# Patient Record
Sex: FEMALE | Race: WHITE | Employment: OTHER | URBAN - METROPOLITAN AREA
[De-identification: names, ages, dates, MRNs, and addresses within clinical notes are randomized per-mention and may not be internally consistent; named-entity substitution may affect disease eponyms.]

---

## 2017-10-24 NOTE — PROGRESS NOTES
Patient completed online preop spine presentation on 10/21/17. Called and spoke with patient to answer any additional questions she may have.

## 2017-10-25 ENCOUNTER — HOSPITAL ENCOUNTER (OUTPATIENT)
Dept: PREADMISSION TESTING | Age: 72
Discharge: HOME | End: 2017-10-25
Payer: MEDICARE

## 2017-10-25 VITALS
DIASTOLIC BLOOD PRESSURE: 71 MMHG | WEIGHT: 181.38 LBS | HEIGHT: 66 IN | HEART RATE: 56 BPM | BODY MASS INDEX: 29.15 KG/M2 | SYSTOLIC BLOOD PRESSURE: 127 MMHG | TEMPERATURE: 98.1 F

## 2017-10-25 LAB
ABO + RH BLD: NORMAL
BLOOD GROUP ANTIBODIES SERPL: NORMAL
SPECIMEN EXP DATE BLD: NORMAL

## 2017-10-25 PROCEDURE — 86900 BLOOD TYPING SEROLOGIC ABO: CPT | Performed by: ORTHOPAEDIC SURGERY

## 2017-10-25 PROCEDURE — 36415 COLL VENOUS BLD VENIPUNCTURE: CPT | Performed by: ORTHOPAEDIC SURGERY

## 2017-10-25 RX ORDER — KETOTIFEN FUMARATE 0.35 MG/ML
1 SOLUTION/ DROPS OPHTHALMIC AS NEEDED
COMMUNITY
End: 2020-11-05

## 2017-10-25 RX ORDER — ESTERIFIED ESTROGEN AND METHYLTESTOSTERONE .625; 1.25 MG/1; MG/1
1 TABLET ORAL DAILY
Status: ON HOLD | COMMUNITY
End: 2017-11-15

## 2017-10-25 RX ORDER — FEXOFENADINE HCL AND PSEUDOEPHEDRINE HCI 180; 240 MG/1; MG/1
1 TABLET, EXTENDED RELEASE ORAL AS NEEDED
Status: ON HOLD | COMMUNITY
End: 2017-11-15

## 2017-10-25 RX ORDER — MEDROXYPROGESTERONE ACETATE 2.5 MG/1
2.5 TABLET ORAL DAILY
Status: ON HOLD | COMMUNITY
End: 2017-10-27

## 2017-10-25 RX ORDER — WARFARIN SODIUM 5 MG/1
5 TABLET ORAL DAILY
Status: ON HOLD | COMMUNITY
End: 2017-11-15

## 2017-10-25 RX ORDER — LEVOTHYROXINE SODIUM 88 UG/1
88 TABLET ORAL
COMMUNITY
End: 2020-11-05 | Stop reason: SDUPTHER

## 2017-10-25 RX ORDER — METFORMIN HYDROCHLORIDE 500 MG/1
1 TABLET, FILM COATED, EXTENDED RELEASE ORAL 2 TIMES DAILY
Status: ON HOLD | COMMUNITY
End: 2017-11-15

## 2017-10-25 RX ORDER — RANITIDINE 150 MG/1
150 CAPSULE ORAL
Status: ON HOLD | COMMUNITY
End: 2017-11-15

## 2017-10-25 RX ORDER — TRIAMCINOLONE ACETONIDE 1 MG/G
OINTMENT TOPICAL AS NEEDED
Status: ON HOLD | COMMUNITY
End: 2017-11-15

## 2017-10-25 RX ORDER — CHOLECALCIFEROL (VITAMIN D3) 125 MCG
1 CAPSULE ORAL AS NEEDED
Status: ON HOLD | COMMUNITY
End: 2017-11-15

## 2017-10-25 RX ORDER — DESOXIMETASONE 2.5 MG/G
CREAM TOPICAL AS NEEDED
COMMUNITY
End: 2020-11-05

## 2017-10-25 RX ORDER — MELATONIN
1000 DAILY
Status: ON HOLD | COMMUNITY
End: 2017-11-15

## 2017-10-26 ENCOUNTER — ANESTHESIA EVENT (OUTPATIENT)
Dept: INTERVENTIONAL RADIOLOGY/VASCULAR | Age: 72
End: 2017-10-26
Payer: MEDICARE

## 2017-10-26 LAB
BACTERIA SPEC CULT: NORMAL
BACTERIA SPEC CULT: NORMAL
SERVICE CMNT-IMP: NORMAL

## 2017-10-27 ENCOUNTER — APPOINTMENT (OUTPATIENT)
Dept: GENERAL RADIOLOGY | Age: 72
End: 2017-10-27
Attending: RADIOLOGY
Payer: MEDICARE

## 2017-10-27 ENCOUNTER — HOSPITAL ENCOUNTER (OUTPATIENT)
Dept: INTERVENTIONAL RADIOLOGY/VASCULAR | Age: 72
Discharge: HOME | End: 2017-10-27
Attending: ORTHOPAEDIC SURGERY | Admitting: ORTHOPAEDIC SURGERY
Payer: MEDICARE

## 2017-10-27 ENCOUNTER — ANESTHESIA (OUTPATIENT)
Dept: INTERVENTIONAL RADIOLOGY/VASCULAR | Age: 72
End: 2017-10-27
Payer: MEDICARE

## 2017-10-27 VITALS
HEIGHT: 66 IN | TEMPERATURE: 97.7 F | OXYGEN SATURATION: 100 % | DIASTOLIC BLOOD PRESSURE: 61 MMHG | RESPIRATION RATE: 18 BRPM | BODY MASS INDEX: 29.41 KG/M2 | HEART RATE: 57 BPM | WEIGHT: 183 LBS | SYSTOLIC BLOOD PRESSURE: 129 MMHG

## 2017-10-27 DIAGNOSIS — M41.55 OTHER SECONDARY SCOLIOSIS, THORACOLUMBAR REGION: ICD-10-CM

## 2017-10-27 DIAGNOSIS — M48.54XA COLLAPSE OF THORACIC VERTEBRA (HCC): ICD-10-CM

## 2017-10-27 LAB
GLUCOSE BLD STRIP.AUTO-MCNC: 94 MG/DL (ref 65–100)
GLUCOSE BLD STRIP.AUTO-MCNC: 95 MG/DL (ref 65–100)
INR BLD: 1 (ref 0.9–1.2)
SERVICE CMNT-IMP: NORMAL
SERVICE CMNT-IMP: NORMAL

## 2017-10-27 PROCEDURE — 74011250636 HC RX REV CODE- 250/636

## 2017-10-27 PROCEDURE — 85610 PROTHROMBIN TIME: CPT

## 2017-10-27 PROCEDURE — 82962 GLUCOSE BLOOD TEST: CPT

## 2017-10-27 PROCEDURE — 71020 XR CHEST PA LAT: CPT

## 2017-10-27 PROCEDURE — 74011636320 HC RX REV CODE- 636/320: Performed by: RADIOLOGY

## 2017-10-27 PROCEDURE — 74011000250 HC RX REV CODE- 250: Performed by: RADIOLOGY

## 2017-10-27 PROCEDURE — 77030034843 HC TAMP SPN BN INFL EXP II KYPH -H

## 2017-10-27 PROCEDURE — 77030003666 HC NDL SPINAL BD -A

## 2017-10-27 PROCEDURE — 77030018846 HC SOL IRR STRL H20 ICUM -A

## 2017-10-27 PROCEDURE — 76060000033 HC ANESTHESIA 1 TO 1.5 HR

## 2017-10-27 PROCEDURE — 77030011860 HC TAMP BN KYPH -C

## 2017-10-27 PROCEDURE — 77030030399

## 2017-10-27 PROCEDURE — 74011250636 HC RX REV CODE- 250/636: Performed by: RADIOLOGY

## 2017-10-27 PROCEDURE — C1713 ANCHOR/SCREW BN/BN,TIS/BN: HCPCS

## 2017-10-27 PROCEDURE — 22513 PERQ VERTEBRAL AUGMENTATION: CPT

## 2017-10-27 RX ORDER — LIDOCAINE HYDROCHLORIDE 20 MG/ML
20 INJECTION, SOLUTION INFILTRATION; PERINEURAL
Status: COMPLETED | OUTPATIENT
Start: 2017-10-27 | End: 2017-10-27

## 2017-10-27 RX ORDER — BUPIVACAINE HYDROCHLORIDE 5 MG/ML
30 INJECTION, SOLUTION EPIDURAL; INTRACAUDAL
Status: COMPLETED | OUTPATIENT
Start: 2017-10-27 | End: 2017-10-27

## 2017-10-27 RX ORDER — KETOROLAC TROMETHAMINE 30 MG/ML
15-30 INJECTION, SOLUTION INTRAMUSCULAR; INTRAVENOUS AS NEEDED
Status: DISCONTINUED | OUTPATIENT
Start: 2017-10-27 | End: 2017-10-27 | Stop reason: HOSPADM

## 2017-10-27 RX ORDER — FENTANYL CITRATE 50 UG/ML
INJECTION, SOLUTION INTRAMUSCULAR; INTRAVENOUS AS NEEDED
Status: DISCONTINUED | OUTPATIENT
Start: 2017-10-27 | End: 2017-10-27 | Stop reason: HOSPADM

## 2017-10-27 RX ORDER — MEDROXYPROGESTERONE ACETATE 2.5 MG/1
2.5 TABLET ORAL DAILY
Status: ON HOLD | COMMUNITY
End: 2017-11-15

## 2017-10-27 RX ORDER — PROPOFOL 10 MG/ML
INJECTION, EMULSION INTRAVENOUS AS NEEDED
Status: DISCONTINUED | OUTPATIENT
Start: 2017-10-27 | End: 2017-10-27 | Stop reason: HOSPADM

## 2017-10-27 RX ORDER — SODIUM CHLORIDE, SODIUM LACTATE, POTASSIUM CHLORIDE, CALCIUM CHLORIDE 600; 310; 30; 20 MG/100ML; MG/100ML; MG/100ML; MG/100ML
INJECTION, SOLUTION INTRAVENOUS
Status: DISCONTINUED | OUTPATIENT
Start: 2017-10-27 | End: 2017-10-27 | Stop reason: HOSPADM

## 2017-10-27 RX ORDER — CEFAZOLIN SODIUM 1 G/3ML
INJECTION, POWDER, FOR SOLUTION INTRAMUSCULAR; INTRAVENOUS AS NEEDED
Status: DISCONTINUED | OUTPATIENT
Start: 2017-10-27 | End: 2017-10-27 | Stop reason: HOSPADM

## 2017-10-27 RX ORDER — CEFAZOLIN SODIUM IN 0.9 % NACL 2 G/50 ML
2 INTRAVENOUS SOLUTION, PIGGYBACK (ML) INTRAVENOUS ONCE
Status: DISCONTINUED | OUTPATIENT
Start: 2017-10-27 | End: 2017-10-27 | Stop reason: HOSPADM

## 2017-10-27 RX ORDER — PROPOFOL 10 MG/ML
INJECTION, EMULSION INTRAVENOUS
Status: DISCONTINUED | OUTPATIENT
Start: 2017-10-27 | End: 2017-10-27 | Stop reason: HOSPADM

## 2017-10-27 RX ORDER — SODIUM CHLORIDE 9 MG/ML
25 INJECTION, SOLUTION INTRAVENOUS CONTINUOUS
Status: DISCONTINUED | OUTPATIENT
Start: 2017-10-27 | End: 2017-10-27 | Stop reason: HOSPADM

## 2017-10-27 RX ORDER — ONDANSETRON 2 MG/ML
INJECTION INTRAMUSCULAR; INTRAVENOUS AS NEEDED
Status: DISCONTINUED | OUTPATIENT
Start: 2017-10-27 | End: 2017-10-27 | Stop reason: HOSPADM

## 2017-10-27 RX ORDER — FENTANYL CITRATE 50 UG/ML
INJECTION, SOLUTION INTRAMUSCULAR; INTRAVENOUS
Status: COMPLETED
Start: 2017-10-27 | End: 2017-10-27

## 2017-10-27 RX ORDER — MIDAZOLAM HYDROCHLORIDE 1 MG/ML
INJECTION, SOLUTION INTRAMUSCULAR; INTRAVENOUS
Status: DISCONTINUED
Start: 2017-10-27 | End: 2017-10-27 | Stop reason: HOSPADM

## 2017-10-27 RX ORDER — DEXAMETHASONE SODIUM PHOSPHATE 4 MG/ML
INJECTION, SOLUTION INTRA-ARTICULAR; INTRALESIONAL; INTRAMUSCULAR; INTRAVENOUS; SOFT TISSUE AS NEEDED
Status: DISCONTINUED | OUTPATIENT
Start: 2017-10-27 | End: 2017-10-27 | Stop reason: HOSPADM

## 2017-10-27 RX ORDER — MIDAZOLAM HYDROCHLORIDE 1 MG/ML
INJECTION, SOLUTION INTRAMUSCULAR; INTRAVENOUS AS NEEDED
Status: DISCONTINUED | OUTPATIENT
Start: 2017-10-27 | End: 2017-10-27 | Stop reason: HOSPADM

## 2017-10-27 RX ORDER — MIDAZOLAM HYDROCHLORIDE 1 MG/ML
INJECTION, SOLUTION INTRAMUSCULAR; INTRAVENOUS AS NEEDED
Status: DISCONTINUED | OUTPATIENT
Start: 2017-10-27 | End: 2017-10-27

## 2017-10-27 RX ADMIN — SODIUM CHLORIDE, SODIUM LACTATE, POTASSIUM CHLORIDE, CALCIUM CHLORIDE: 600; 310; 30; 20 INJECTION, SOLUTION INTRAVENOUS at 10:53

## 2017-10-27 RX ADMIN — SODIUM CHLORIDE, SODIUM LACTATE, POTASSIUM CHLORIDE, CALCIUM CHLORIDE: 600; 310; 30; 20 INJECTION, SOLUTION INTRAVENOUS at 11:30

## 2017-10-27 RX ADMIN — CEFAZOLIN SODIUM 2 G: 1 INJECTION, POWDER, FOR SOLUTION INTRAMUSCULAR; INTRAVENOUS at 11:07

## 2017-10-27 RX ADMIN — PROPOFOL 10 MG: 10 INJECTION, EMULSION INTRAVENOUS at 11:28

## 2017-10-27 RX ADMIN — PROPOFOL 20 MG: 10 INJECTION, EMULSION INTRAVENOUS at 11:23

## 2017-10-27 RX ADMIN — DEXAMETHASONE SODIUM PHOSPHATE 4 MG: 4 INJECTION, SOLUTION INTRA-ARTICULAR; INTRALESIONAL; INTRAMUSCULAR; INTRAVENOUS; SOFT TISSUE at 11:15

## 2017-10-27 RX ADMIN — PROPOFOL 40 MG: 10 INJECTION, EMULSION INTRAVENOUS at 11:05

## 2017-10-27 RX ADMIN — PROPOFOL 50 MCG/KG/MIN: 10 INJECTION, EMULSION INTRAVENOUS at 11:10

## 2017-10-27 RX ADMIN — FENTANYL CITRATE 25 MCG: 50 INJECTION, SOLUTION INTRAMUSCULAR; INTRAVENOUS at 11:23

## 2017-10-27 RX ADMIN — IOHEXOL 20 ML: 240 INJECTION, SOLUTION INTRATHECAL; INTRAVASCULAR; INTRAVENOUS; ORAL at 11:25

## 2017-10-27 RX ADMIN — MIDAZOLAM HYDROCHLORIDE 3 MG: 1 INJECTION, SOLUTION INTRAMUSCULAR; INTRAVENOUS at 11:05

## 2017-10-27 RX ADMIN — FENTANYL CITRATE 12.5 MCG: 50 INJECTION, SOLUTION INTRAMUSCULAR; INTRAVENOUS at 11:44

## 2017-10-27 RX ADMIN — PROPOFOL 10 MG: 10 INJECTION, EMULSION INTRAVENOUS at 11:33

## 2017-10-27 RX ADMIN — PROPOFOL 10 MG: 10 INJECTION, EMULSION INTRAVENOUS at 11:46

## 2017-10-27 RX ADMIN — DEXAMETHASONE SODIUM PHOSPHATE 4 MG: 4 INJECTION, SOLUTION INTRA-ARTICULAR; INTRALESIONAL; INTRAMUSCULAR; INTRAVENOUS; SOFT TISSUE at 12:22

## 2017-10-27 RX ADMIN — LIDOCAINE HYDROCHLORIDE 10 ML: 20 INJECTION, SOLUTION INFILTRATION; PERINEURAL at 11:24

## 2017-10-27 RX ADMIN — SODIUM CHLORIDE 25 ML/HR: 900 INJECTION, SOLUTION INTRAVENOUS at 09:54

## 2017-10-27 RX ADMIN — ONDANSETRON 4 MG: 2 INJECTION INTRAMUSCULAR; INTRAVENOUS at 11:15

## 2017-10-27 RX ADMIN — BUPIVACAINE HYDROCHLORIDE 10 ML: 5 INJECTION, SOLUTION EPIDURAL; INTRACAUDAL at 11:24

## 2017-10-27 NOTE — IP AVS SNAPSHOT
[address] [facility] 
[phone] Patient: Caprice Miller MRN: [id number] AUR:7[birth year]39[birth year] About your hospitalization You were admitted on:  October 27, 2017 You last received care in the:  [facility] RAD ANGIO IR You were discharged on:  October 27, 2017 Why you were hospitalized Your primary diagnosis was:  Not on File Things You Need To Do (next 8 weeks) Follow up with Alfredo Ahumada, MD  
  
Phone:  [phone] Where:  [address], [address] [facility] [address] Discharge Orders None A check kaur indicates which time of day the medication should be taken. My Medications TAKE these medications as instructed Instructions Each Dose to Equal  
 Morning Noon Evening Bedtime ALEVE 220 mg Cap Generic drug:  naproxen sodium Your last dose was: Your next dose is: Take 1 Cap by mouth as needed. 1 Cap ENRIKE-SELTZER HEARTBURN+-80 mg Athigo Generic drug:  calcium carbonate-simethicone Your last dose was: Your next dose is: Take  by mouth nightly as needed. ALLEGRA-D 24 HOUR 180-240 mg per tablet Generic drug:  fexofenadine-pseudoephedrine Your last dose was: Your next dose is: Take 1 Tab by mouth as needed. 1 Tab AYR SALINE 0.65 % nasal spray Generic drug:  sodium chloride Your last dose was: Your next dose is:    
   
   
 1 Spray as needed for Congestion. 1 Spray AYR SALINE topical gel Generic drug:  sodium chloride-aloe vera Your last dose was: Your next dose is:    
   
   
 Apply  to affected area as needed. Benefiber Clear 3 gram/3.5 gram Pwpk Generic drug:  Wheat Dextrin Your last dose was: Your next dose is: Take  by mouth Daily (before breakfast). CENTRUM SILVER 0.4-300-250 mg-mcg-mcg Tab Generic drug:  multivit-min-FA-lycopen-lutein Your last dose was: Your next dose is: Take 1 Tab by mouth daily. 1 Tab COVARYX H.S. 0.625-1.25 mg per tablet Generic drug:  estrogens (conjugated)-methylTESTOSTERone Your last dose was: Your next dose is: Take 1 Tab by mouth daily. 1 Tab CULTURELLE PROBIOTICS PO Your last dose was: Your next dose is: Take 1 Tab by mouth daily. [address] 1 Tab  
    
   
   
   
  
 desoximetasone 0.25 % topical cream  
Commonly known as:  Tasha Garrido Your last dose was: Your next dose is:    
   
   
 Apply  to affected area as needed for Skin Irritation. EYE ITCH RELIEF 0.025 % (0.035 %) ophthalmic solution Generic drug:  ketotifen Your last dose was: Your next dose is:    
   
   
 Administer 1 Drop to both eyes as needed. 1 Drop  
    
   
   
   
  
 medroxyPROGESTERone 2.5 mg tablet Commonly known as:  PROVERA Your last dose was: Your next dose is: Take 2.5 mg by mouth daily. 2.5 mg  
    
   
   
   
  
 metFORMIN 500 mg Tg24 24 hour tablet Commonly known asEarna Schwalbe ER Your last dose was: Your next dose is: Take 1 Tab by mouth two (2) times a day. AFTER BREAKFAST AND DINNER  
 1 Tab NAPHCON-A 0.025-0.3 % ophthalmic solution Generic drug:  naphazoline-pheniramine Your last dose was: Your next dose is:    
   
   
 Administer  to both eyes as needed. OAT BRAN PO Your last dose was: Your next dose is: Take 1 Tab by mouth daily. 1 Tab OMEGA 3 FISH OIL PO Your last dose was: Your next dose is: Take 1 Tab by mouth daily. 180 EPA, 120 DHA  
 1 Tab  
    
   
   
   
  
 raNITIdine hcl 150 mg capsule Your last dose was: Your next dose is: Take 150 mg by mouth nightly as needed for Indigestion. 150 mg SYNTHROID 88 mcg tablet Generic drug:  levothyroxine Your last dose was: Your next dose is: Take 88 mcg by mouth Daily (before breakfast). 88 mcg SYSTANE (PROPYLENE GLYCOL) 0.4-0.3 % Drop Generic drug:  peg 400-propylene glycol Your last dose was: Your next dose is:    
   
   
 Administer  to left eye as needed. triamcinolone acetonide 0.1 % ointment Commonly known as:  KENALOG Your last dose was: Your next dose is:    
   
   
 Apply  to affected area as needed for Skin Irritation. use thin layer VITAMIN D3 1,000 unit tablet Generic drug:  cholecalciferol Your last dose was: Your next dose is: Take 1,000 Units by mouth daily. 1000 Units  
    
   
   
   
  
 warfarin 5 mg tablet Commonly known as:  COUMADIN Your last dose was: Your next dose is: Take 5 mg by mouth daily. 5 mg Discharge Instructions [facility] [address] [facility] Department of Interventional Radiology [phone] KYPHOPLASTY DISCHARGE INSTRUCTIONS General Information: This procedure is done to help with the back pain that is associated with compression fractures in the spine. The kyphoplasty involves placing a balloon into the space of the vertebrae that is fractured, blowing up the balloon, therefore realigning the broken pieces of bone, and then injecting cement into the space to strengthen the vertebrae.  The pain experienced from compression fractures is caused by the vertebrae not being stabilized. The cement stabilizes the bone, therefore reducing the pain. Home Care Instructions: You can resume your regular diet and medication regimen. Do not drink alcohol, drive, or make any important legal decisions in the next 24 hours. Do not lift anything heavier than a gallon of milk, or do anything strenuous for the next 24 hours. You will notice a dressing on your lower back after your procedure. This dressing can be removed in 24 hours. Showering is acceptable in 24 hours, but you should refrain from tub baths or swimming for 5 days. You will be asked to come back in for a recheck about 30 days after your procedure. At that time, our physician will ask you questions about your pain and if it has improved. Call If: 
   You should call your Physician and/or the Radiology Nurse if you have any bleeding other than a small spot on your bandage. Call if you have any signs of infection, fever, or increased pain at the site. Call if you should have new or worsening pain in your back, or if you lose control of your bladder or bowel. Any tingling or loss of feeling or movement in your legs should also be reported. Follow-Up Instructions: Please see your ordering doctor as he/she has requested. To Reach Us: Side effects of sedation medications and other medications used today have been reviewed. Notify us of nausea, itching, hives, dizziness, or anything else out of the ordinary. Should you experience any of these significant changes, please call [phone] between the hours of 7:30 am and 10 pm or [phone] after hours. After hours, ask the  to page the [address] Technologist, and describe the problem to the technologist.  
 
  
  
  
Introducing [facility]! [employer] introduces [employer] patient portal. Now you can access parts of your medical record, email your doctor's office, and request medication refills online.    
 
1. In your internet browser, go to [email or web address] 2. Click on the First Time User? Click Here link in the Sign In box. You will see the New Member Sign Up page. 3. Enter your SHADOW Access Code exactly as it appears below. You will not need to use this code after youve completed the sign-up process. If you do not sign up before the expiration date, you must request a new code. · SHADOW Access Code: [email or web address] Expires: 1/10/2018  1:57 PM 
 
4. Enter the last four digits of your Social Security Number (xxxx) and Date of Birth (mm/dd/yyyy) as indicated and click Submit. You will be taken to the next sign-up page. 5. Create a [employer] ID. This will be your SHADOW login ID and cannot be changed, so think of one that is secure and easy to remember. 6. Create a SHADOW password. You can change your password at any time. 7. Enter your Password Reset Question and Answer. This can be used at a later time if you forget your password. 8. Enter your e-mail address. You will receive e-mail notification when new information is available in [address] 9. Click Sign Up. You can now view and download portions of your medical record. 10. Click the Download Summary menu link to download a portable copy of your medical information. If you have questions, please visit the Frequently Asked Questions section of the SHADOW website. Remember, SHADOW is NOT to be used for urgent needs. For medical emergencies, dial 911. Now available from your iPhone and Android! Providers Seen During Your Hospitalization Provider Specialty Primary office phone Nehemias Castellon MD Orthopedic Surgery [phone] Your Primary Care Physician (PCP) Primary Care Physician Office Phone Office Fax [facility] [phone] You are allergic to the following Allergen Reactions Bactrim (Sulfamethoprim) Swelling \"LIP SWELLING\" Recent Documentation Height Weight BMI Smoking Status 1.676 m 83 kg 29.54 kg/m2 Never Assessed Emergency Contacts Name Discharge Info Relation Home Work Mobile [address] CAREGIVER [3] Spouse [3] [phone] Patient Belongings The following personal items are in your possession at time of discharge: 
     Visual Aid: None Please provide this summary of care documentation to your next provider. Signatures-by signing, you are acknowledging that this After Visit Summary has been reviewed with you and you have received a copy. Patient Signature:  ____________________________________________________________ Date:  ____________________________________________________________  
  
[facility] Provider Signature:  ____________________________________________________________ Date:  ____________________________________________________________

## 2017-10-27 NOTE — PROCEDURES
PROCEDURE:Kyphoplasty T3 and T4 vertebra. INDICATION:preoperative augmentation. ANESTHESIA:MAC sedation and local.  COMPLICATION:NONE. SPECIMENS REMOVED:none. BLOOD LOSS:NONE. /ASSISTANT:ROMIE Webster RECOMMENDATIONS:none. CONSENT OBTAINED:YES.  NOTES:none.

## 2017-10-27 NOTE — ANESTHESIA PREPROCEDURE EVALUATION
Anesthetic History     PONV          Review of Systems / Medical History  Patient summary reviewed, nursing notes reviewed and pertinent labs reviewed    Pulmonary  Within defined limits                 Neuro/Psych   Within defined limits           Cardiovascular            Dysrhythmias : atrial fibrillation      Exercise tolerance: >4 METS     GI/Hepatic/Renal     GERD: well controlled           Endo/Other    Diabetes  Hypothyroidism  Arthritis     Other Findings              Physical Exam    Airway  Mallampati: II  TM Distance: 4 - 6 cm  Neck ROM: normal range of motion   Mouth opening: Normal     Cardiovascular  Regular rate and rhythm,  S1 and S2 normal,  no murmur, click, rub, or gallop             Dental    Dentition: Caps/crowns     Pulmonary  Breath sounds clear to auscultation               Abdominal  GI exam deferred       Other Findings            Anesthetic Plan    ASA: 3  Anesthesia type: MAC          Induction: Intravenous  Anesthetic plan and risks discussed with: Patient

## 2017-10-27 NOTE — ANESTHESIA POSTPROCEDURE EVALUATION
Post-Anesthesia Evaluation and Assessment    Patient: Ck Campo MRN: [id number]  SSN: [id number]    YOB: 1945  Age: 81 y.o. Sex: female       Cardiovascular Function/Vital Signs  Visit Vitals    /78 (BP 1 Location: Left arm, BP Patient Position: Supine)    Pulse (!) 56    Temp 36.5 °C (97.7 °F)    Resp 16    Ht 5' 6\" (1.676 m)    Wt 83 kg (183 lb)    SpO2 100%    BMI 29.54 kg/m2       Patient is status post MAC anesthesia for * No procedures listed *. Nausea/Vomiting: None    Postoperative hydration reviewed and adequate. Pain:  Pain Scale 1: Visual (10/27/17 1230)  Pain Intensity 1: 0 (10/27/17 1230)   Managed    Neurological Status: At baseline    Mental Status and Level of Consciousness: Arousable    Pulmonary Status:   O2 Device: Room air (10/27/17 1315)   Adequate oxygenation and airway patent    Complications related to anesthesia: None    Post-anesthesia assessment completed.  No concerns    Signed By: Sun Vang MD     October 27, 2017

## 2017-10-27 NOTE — H&P
Radiology History and Physical    Patient: Nallely Ndiaye 81 y.o. female     Chief Complaint: No chief complaint on file. History of Present Illness: Scoliosis. History:    Past Medical History:   Diagnosis Date    Anxiety     Arrhythmia     A-FIB    Arthritis     BACK AND \"EVERYWHERE\"    Diabetes ([address]     TYPE II    GERD (gastroesophageal reflux disease)     Nausea & vomiting     Thyroid disease     HYPO     History reviewed. No pertinent family history. Social History     Social History    Marital status:      Spouse name: N/A    Number of children: N/A    Years of education: N/A     Occupational History    Not on file. Social History Main Topics    Smoking status: Not on file    Smokeless tobacco: Not on file    Alcohol use 2.4 oz/week     2 Cans of beer, 1 Shots of liquor, 1 Glasses of wine per week    Drug use: Not on file    Sexual activity: Not on file     Other Topics Concern    Not on file     Social History Narrative       Allergies: Allergies   Allergen Reactions    Bactrim [Sulfamethoprim] Swelling     \"LIP SWELLING\"       Current Medications:  Current Facility-Administered Medications   Medication Dose Route Frequency    0.9% sodium chloride infusion  25 mL/hr IntraVENous CONTINUOUS    ceFAZolin in 0.9% NS (ANCEF) IVPB soln 2 g  2 g IntraVENous ONCE    ketorolac (TORADOL) injection 15-30 mg  15-30 mg IntraVENous PRN    midazolam (VERSED) 1 mg/mL injection            Physical Exam:  Blood pressure 129/61, pulse (!) 57, temperature 97.7 °F (36.5 °C), resp. rate 18, height 5' 6\" (1.676 m), weight 83 kg (183 lb), SpO2 100 %.   GENERAL: alert, cooperative, no distress, appears stated age, LUNG: clear to auscultation bilaterally, HEART: regular rate and rhythm      Alerts:      Laboratory:      Recent Labs      10/27/17   0953   INR  1.0         Plan of Care/Planned Procedure:  Risks, benefits, and alternatives reviewed with patient and she agrees to proceed with the procedure.

## 2017-10-27 NOTE — DISCHARGE INSTRUCTIONS
[address] [facility]  Department of Interventional Radiology  [phone]    KYPHOPLASTY DISCHARGE INSTRUCTIONS    General Information: This procedure is done to help with the back pain that is associated with compression fractures in the spine. The kyphoplasty involves placing a balloon into the space of the vertebrae that is fractured, blowing up the balloon, therefore realigning the broken pieces of bone, and then injecting cement into the space to strengthen the vertebrae. The pain experienced from compression fractures is caused by the vertebrae not being stabilized. The cement stabilizes the bone, therefore reducing the pain. Home Care Instructions: You can resume your regular diet and medication regimen. Do not drink alcohol, drive, or make any important legal decisions in the next 24 hours. Do not lift anything heavier than a gallon of milk, or do anything strenuous for the next 24 hours. You will notice a dressing on your lower back after your procedure. This dressing can be removed in 24 hours. Showering is acceptable in 24 hours, but you should refrain from tub baths or swimming for 5 days. You will be asked to come back in for a recheck about 30 days after your procedure. At that time, our physician will ask you questions about your pain and if it has improved. Call If:     You should call your Physician and/or the Radiology Nurse if you have any bleeding other than a small spot on your bandage. Call if you have any signs of infection, fever, or increased pain at the site. Call if you should have new or worsening pain in your back, or if you lose control of your bladder or bowel. Any tingling or loss of feeling or movement in your legs should also be reported. Follow-Up Instructions: Please see your ordering doctor as he/she has requested. To Reach Us: Side effects of sedation medications and other medications used today have been reviewed. Notify us of nausea, itching, hives, dizziness, or anything else out of the ordinary. Should you experience any of these significant changes, please call [phone] between the hours of 7:30 am and 10 pm or [phone] after hours.  After hours, ask the  to page the [address] Technologist, and describe the problem to the technologist.

## 2017-10-27 NOTE — PROGRESS NOTES
0920: pt to angio holding for kyphoplasty  1055: to angio room 1 for procedure. VIJAYA Riley to monitor vitals, airway, and sedation- see anesthesia encounter. 1123: procedure start  1203: procedure complete. Pt tolerated well. 1215: pt to angio holding s/p procedure. Received report from [address] [place] [address]  1430: I have reviewed discharge instructions with the patient. The patient verbalized understanding.

## 2017-11-15 ENCOUNTER — APPOINTMENT (OUTPATIENT)
Dept: GENERAL RADIOLOGY | Age: 72
DRG: 454 | End: 2017-11-15
Attending: ORTHOPAEDIC SURGERY
Payer: MEDICARE

## 2017-11-15 ENCOUNTER — ANESTHESIA EVENT (OUTPATIENT)
Dept: SURGERY | Age: 72
DRG: 454 | End: 2017-11-15
Payer: MEDICARE

## 2017-11-15 ENCOUNTER — ANESTHESIA (OUTPATIENT)
Dept: SURGERY | Age: 72
DRG: 454 | End: 2017-11-15
Payer: MEDICARE

## 2017-11-15 ENCOUNTER — HOSPITAL ENCOUNTER (INPATIENT)
Age: 72
LOS: 7 days | Discharge: NURSING, REHAB OR LONG-TERM CARE | DRG: 454 | End: 2017-11-22
Attending: ORTHOPAEDIC SURGERY | Admitting: ORTHOPAEDIC SURGERY
Payer: MEDICARE

## 2017-11-15 PROBLEM — M41.9 KYPHOSCOLIOSIS: Status: ACTIVE | Noted: 2017-11-15

## 2017-11-15 LAB
ABO + RH BLD: NORMAL
BLOOD GROUP ANTIBODIES SERPL: NORMAL
GLUCOSE BLD STRIP.AUTO-MCNC: 131 MG/DL (ref 65–100)
GLUCOSE BLD STRIP.AUTO-MCNC: 97 MG/DL (ref 65–100)
SERVICE CMNT-IMP: ABNORMAL
SERVICE CMNT-IMP: NORMAL
SPECIMEN EXP DATE BLD: NORMAL

## 2017-11-15 PROCEDURE — 72020 X-RAY EXAM OF SPINE 1 VIEW: CPT

## 2017-11-15 PROCEDURE — 01NR0ZZ RELEASE SACRAL NERVE, OPEN APPROACH: ICD-10-PCS | Performed by: ORTHOPAEDIC SURGERY

## 2017-11-15 PROCEDURE — C1713 ANCHOR/SCREW BN/BN,TIS/BN: HCPCS | Performed by: ORTHOPAEDIC SURGERY

## 2017-11-15 PROCEDURE — 77030029099 HC BN WAX SSPC -A: Performed by: ORTHOPAEDIC SURGERY

## 2017-11-15 PROCEDURE — 77030018836 HC SOL IRR NACL ICUM -A: Performed by: ORTHOPAEDIC SURGERY

## 2017-11-15 PROCEDURE — 36415 COLL VENOUS BLD VENIPUNCTURE: CPT | Performed by: ORTHOPAEDIC SURGERY

## 2017-11-15 PROCEDURE — 74011250636 HC RX REV CODE- 250/636: Performed by: ORTHOPAEDIC SURGERY

## 2017-11-15 PROCEDURE — 74011250636 HC RX REV CODE- 250/636: Performed by: PHYSICIAN ASSISTANT

## 2017-11-15 PROCEDURE — 74011250636 HC RX REV CODE- 250/636

## 2017-11-15 PROCEDURE — 82962 GLUCOSE BLOOD TEST: CPT

## 2017-11-15 PROCEDURE — 77030019908 HC STETH ESOPH SIMS -A: Performed by: ANESTHESIOLOGY

## 2017-11-15 PROCEDURE — P9045 ALBUMIN (HUMAN), 5%, 250 ML: HCPCS

## 2017-11-15 PROCEDURE — 77030026438 HC STYL ET INTUB CARD -A: Performed by: ANESTHESIOLOGY

## 2017-11-15 PROCEDURE — 77030014007 HC SPNG HEMSTAT J&J -B: Performed by: ORTHOPAEDIC SURGERY

## 2017-11-15 PROCEDURE — 76210000001 HC OR PH I REC 2.5 TO 3 HR: Performed by: ORTHOPAEDIC SURGERY

## 2017-11-15 PROCEDURE — 77030037728 HC GRFT BN FBR CORT 3DEMIN 30CC BACT -I: Performed by: ORTHOPAEDIC SURGERY

## 2017-11-15 PROCEDURE — 0SG30A0 FUSION OF LUMBOSACRAL JOINT WITH INTERBODY FUSION DEVICE, ANTERIOR APPROACH, ANTERIOR COLUMN, OPEN APPROACH: ICD-10-PCS | Performed by: ORTHOPAEDIC SURGERY

## 2017-11-15 PROCEDURE — 77030013533 HC SEAL FBRN TISSL RDYTUSE 10ML BAXT -E: Performed by: ORTHOPAEDIC SURGERY

## 2017-11-15 PROCEDURE — 74011000250 HC RX REV CODE- 250: Performed by: ORTHOPAEDIC SURGERY

## 2017-11-15 PROCEDURE — 00UT07Z SUPPLEMENT SPINAL MENINGES WITH AUTOLOGOUS TISSUE SUBSTITUTE, OPEN APPROACH: ICD-10-PCS | Performed by: ORTHOPAEDIC SURGERY

## 2017-11-15 PROCEDURE — 77030032490 HC SLV COMPR SCD KNE COVD -B: Performed by: ORTHOPAEDIC SURGERY

## 2017-11-15 PROCEDURE — 0SG10A0 FUSION OF 2 OR MORE LUMBAR VERTEBRAL JOINTS WITH INTERBODY FUSION DEVICE, ANTERIOR APPROACH, ANTERIOR COLUMN, OPEN APPROACH: ICD-10-PCS | Performed by: ORTHOPAEDIC SURGERY

## 2017-11-15 PROCEDURE — 77030010516 HC APPL HEMA CLP TELE -B: Performed by: ORTHOPAEDIC SURGERY

## 2017-11-15 PROCEDURE — 77030018723 HC ELCTRD BLD COVD -A: Performed by: ORTHOPAEDIC SURGERY

## 2017-11-15 PROCEDURE — 77030031753 HC SHR ENDO COAG HARM J&J -E: Performed by: ORTHOPAEDIC SURGERY

## 2017-11-15 PROCEDURE — 77030020263 HC SOL INJ SOD CL0.9% LFCR 1000ML: Performed by: ORTHOPAEDIC SURGERY

## 2017-11-15 PROCEDURE — 77030002996 HC SUT SLK J&J -A: Performed by: ORTHOPAEDIC SURGERY

## 2017-11-15 PROCEDURE — 77030036946 HC GRFT BN FBR CORT 3DEMIN 10CC BACT -G: Performed by: ORTHOPAEDIC SURGERY

## 2017-11-15 PROCEDURE — C1751 CATH, INF, PER/CENT/MIDLINE: HCPCS | Performed by: ANESTHESIOLOGY

## 2017-11-15 PROCEDURE — 77030020609 HC SPCR IF SUSTN RL GLBM -I1: Performed by: ORTHOPAEDIC SURGERY

## 2017-11-15 PROCEDURE — 77030004391 HC BUR FLUT MEDT -C: Performed by: ORTHOPAEDIC SURGERY

## 2017-11-15 PROCEDURE — 0ST20ZZ RESECTION OF LUMBAR VERTEBRAL DISC, OPEN APPROACH: ICD-10-PCS | Performed by: ORTHOPAEDIC SURGERY

## 2017-11-15 PROCEDURE — 72072 X-RAY EXAM THORAC SPINE 3VWS: CPT

## 2017-11-15 PROCEDURE — 77030031139 HC SUT VCRL2 J&J -A: Performed by: ORTHOPAEDIC SURGERY

## 2017-11-15 PROCEDURE — 0ST40ZZ RESECTION OF LUMBOSACRAL DISC, OPEN APPROACH: ICD-10-PCS | Performed by: ORTHOPAEDIC SURGERY

## 2017-11-15 PROCEDURE — 74011000258 HC RX REV CODE- 258

## 2017-11-15 PROCEDURE — 0SG3071 FUSION OF LUMBOSACRAL JOINT WITH AUTOLOGOUS TISSUE SUBSTITUTE, POSTERIOR APPROACH, POSTERIOR COLUMN, OPEN APPROACH: ICD-10-PCS | Performed by: ORTHOPAEDIC SURGERY

## 2017-11-15 PROCEDURE — 77030034479 HC ADH SKN CLSR PRINEO J&J -B: Performed by: ORTHOPAEDIC SURGERY

## 2017-11-15 PROCEDURE — 0SG1071 FUSION OF 2 OR MORE LUMBAR VERTEBRAL JOINTS WITH AUTOLOGOUS TISSUE SUBSTITUTE, POSTERIOR APPROACH, POSTERIOR COLUMN, OPEN APPROACH: ICD-10-PCS | Performed by: ORTHOPAEDIC SURGERY

## 2017-11-15 PROCEDURE — 85018 HEMOGLOBIN: CPT

## 2017-11-15 PROCEDURE — 77030008467 HC STPLR SKN COVD -B: Performed by: ORTHOPAEDIC SURGERY

## 2017-11-15 PROCEDURE — 76060000052 HC ANESTHESIA 10.5 TO 11 HR: Performed by: ORTHOPAEDIC SURGERY

## 2017-11-15 PROCEDURE — 01NB0ZZ RELEASE LUMBAR NERVE, OPEN APPROACH: ICD-10-PCS | Performed by: ORTHOPAEDIC SURGERY

## 2017-11-15 PROCEDURE — 77030002924 HC SUT GORTX WLGO -B: Performed by: ORTHOPAEDIC SURGERY

## 2017-11-15 PROCEDURE — 77030013079 HC BLNKT BAIR HGGR 3M -A: Performed by: ANESTHESIOLOGY

## 2017-11-15 PROCEDURE — 76010000164 HC OR TIME 10.5 TO 11 HR INTENSV-TIER 1: Performed by: ORTHOPAEDIC SURGERY

## 2017-11-15 PROCEDURE — 77030003196 HC GRFT RECOMB BN MEDT -K1: Performed by: ORTHOPAEDIC SURGERY

## 2017-11-15 PROCEDURE — 77030034850: Performed by: ORTHOPAEDIC SURGERY

## 2017-11-15 PROCEDURE — 77030008684 HC TU ET CUF COVD -B: Performed by: ANESTHESIOLOGY

## 2017-11-15 PROCEDURE — 74011000250 HC RX REV CODE- 250

## 2017-11-15 PROCEDURE — C1751 CATH, INF, PER/CENT/MIDLINE: HCPCS

## 2017-11-15 PROCEDURE — 77030013079 HC BLNKT BAIR HGGR 3M -A

## 2017-11-15 PROCEDURE — 71010 XR CHEST PORT: CPT

## 2017-11-15 PROCEDURE — 77030014647 HC SEAL FBRN TISSL BAXT -D: Performed by: ORTHOPAEDIC SURGERY

## 2017-11-15 PROCEDURE — 0RGA071 FUSION OF THORACOLUMBAR VERTEBRAL JOINT WITH AUTOLOGOUS TISSUE SUBSTITUTE, POSTERIOR APPROACH, POSTERIOR COLUMN, OPEN APPROACH: ICD-10-PCS | Performed by: ORTHOPAEDIC SURGERY

## 2017-11-15 PROCEDURE — 77030012406 HC DRN WND PENRS BARD -A: Performed by: ORTHOPAEDIC SURGERY

## 2017-11-15 PROCEDURE — 77030034475 HC MISC IMPL SPN: Performed by: ORTHOPAEDIC SURGERY

## 2017-11-15 PROCEDURE — 77030021668 HC NEB PREFIL KT VYRM -A

## 2017-11-15 PROCEDURE — 77030020782 HC GWN BAIR PAWS FLX 3M -B

## 2017-11-15 PROCEDURE — 74011250637 HC RX REV CODE- 250/637: Performed by: PHYSICIAN ASSISTANT

## 2017-11-15 PROCEDURE — 86900 BLOOD TYPING SEROLOGIC ABO: CPT | Performed by: ORTHOPAEDIC SURGERY

## 2017-11-15 PROCEDURE — 74011250636 HC RX REV CODE- 250/636: Performed by: ANESTHESIOLOGY

## 2017-11-15 PROCEDURE — 77030010939 HC CLP LIG TELE -B: Performed by: ORTHOPAEDIC SURGERY

## 2017-11-15 PROCEDURE — 77030020061 HC IV BLD WRMR ADMIN SET 3M -B: Performed by: ANESTHESIOLOGY

## 2017-11-15 PROCEDURE — 74011000272 HC RX REV CODE- 272: Performed by: ORTHOPAEDIC SURGERY

## 2017-11-15 PROCEDURE — 65270000029 HC RM PRIVATE

## 2017-11-15 PROCEDURE — 0RG80Z1: ICD-10-PCS | Performed by: ORTHOPAEDIC SURGERY

## 2017-11-15 DEVICE — CREO&REG; 5.5, 6.5 X 35MM POLYAXIAL SCREW
Type: IMPLANTABLE DEVICE | Site: SPINE LUMBAR | Status: FUNCTIONAL
Brand: CREO

## 2017-11-15 DEVICE — 5.5 LOCKING CAP, CREO
Type: IMPLANTABLE DEVICE | Site: SPINE LUMBAR | Status: FUNCTIONAL
Brand: CREO

## 2017-11-15 DEVICE — GRAFT BONE SUB 10CC PUTTY DEMIN BONE MTRX OSTEOSELECT: Type: IMPLANTABLE DEVICE | Site: SPINE LUMBAR | Status: FUNCTIONAL

## 2017-11-15 DEVICE — SUSTAIN RADIOLUCENT SPACER, LARGE, ANTERIOR, 9MM
Type: IMPLANTABLE DEVICE | Site: SPINE LUMBAR | Status: FUNCTIONAL
Brand: SUSTAIN

## 2017-11-15 DEVICE — BONE GRAFT KIT 7510600 INFUSE LARGE
Type: IMPLANTABLE DEVICE | Site: SPINE LUMBAR | Status: FUNCTIONAL
Brand: INFUSE® BONE GRAFT

## 2017-11-15 DEVICE — IMPLANTABLE DEVICE: Type: IMPLANTABLE DEVICE | Site: SPINE LUMBAR | Status: FUNCTIONAL

## 2017-11-15 DEVICE — SUSTAIN RADIOLUCENT LARGE, ANTERIOR, 15 DEG , 13MM
Type: IMPLANTABLE DEVICE | Site: SPINE LUMBAR | Status: FUNCTIONAL
Brand: SUSTAIN

## 2017-11-15 DEVICE — GRAFT BNE 3D 30 CC CORTICAL FIBER: Type: IMPLANTABLE DEVICE | Site: SPINE LUMBAR | Status: FUNCTIONAL

## 2017-11-15 RX ORDER — SODIUM CHLORIDE, SODIUM LACTATE, POTASSIUM CHLORIDE, CALCIUM CHLORIDE 600; 310; 30; 20 MG/100ML; MG/100ML; MG/100ML; MG/100ML
100 INJECTION, SOLUTION INTRAVENOUS CONTINUOUS
Status: DISCONTINUED | OUTPATIENT
Start: 2017-11-15 | End: 2017-11-15 | Stop reason: HOSPADM

## 2017-11-15 RX ORDER — SODIUM CHLORIDE, SODIUM LACTATE, POTASSIUM CHLORIDE, CALCIUM CHLORIDE 600; 310; 30; 20 MG/100ML; MG/100ML; MG/100ML; MG/100ML
INJECTION, SOLUTION INTRAVENOUS
Status: DISCONTINUED | OUTPATIENT
Start: 2017-11-15 | End: 2017-11-15 | Stop reason: HOSPADM

## 2017-11-15 RX ORDER — OXYCODONE HYDROCHLORIDE 5 MG/1
10 TABLET ORAL
Status: DISCONTINUED | OUTPATIENT
Start: 2017-11-15 | End: 2017-11-17

## 2017-11-15 RX ORDER — ONDANSETRON 2 MG/ML
4 INJECTION INTRAMUSCULAR; INTRAVENOUS
Status: DISPENSED | OUTPATIENT
Start: 2017-11-15 | End: 2017-11-19

## 2017-11-15 RX ORDER — LIDOCAINE HYDROCHLORIDE 10 MG/ML
0.1 INJECTION, SOLUTION EPIDURAL; INFILTRATION; INTRACAUDAL; PERINEURAL AS NEEDED
Status: DISCONTINUED | OUTPATIENT
Start: 2017-11-15 | End: 2017-11-15 | Stop reason: HOSPADM

## 2017-11-15 RX ORDER — KETAMINE HYDROCHLORIDE 10 MG/ML
INJECTION, SOLUTION INTRAMUSCULAR; INTRAVENOUS AS NEEDED
Status: DISCONTINUED | OUTPATIENT
Start: 2017-11-15 | End: 2017-11-15 | Stop reason: HOSPADM

## 2017-11-15 RX ORDER — POLYETHYLENE GLYCOL 3350 17 G/17G
17 POWDER, FOR SOLUTION ORAL DAILY
Status: DISCONTINUED | OUTPATIENT
Start: 2017-11-16 | End: 2017-11-17

## 2017-11-15 RX ORDER — CEFAZOLIN SODIUM/WATER 2 G/20 ML
2 SYRINGE (ML) INTRAVENOUS ONCE
Status: COMPLETED | OUTPATIENT
Start: 2017-11-15 | End: 2017-11-15

## 2017-11-15 RX ORDER — LEVOTHYROXINE SODIUM 88 UG/1
88 TABLET ORAL
Status: DISCONTINUED | OUTPATIENT
Start: 2017-11-15 | End: 2017-11-22 | Stop reason: HOSPADM

## 2017-11-15 RX ORDER — HYDROMORPHONE HCL IN 0.9% NACL 15 MG/30ML
PATIENT CONTROLLED ANALGESIA VIAL INTRAVENOUS
Status: DISCONTINUED | OUTPATIENT
Start: 2017-11-15 | End: 2017-11-16

## 2017-11-15 RX ORDER — ATROPINE SULFATE 0.4 MG/ML
INJECTION, SOLUTION ENDOTRACHEAL; INTRAMEDULLARY; INTRAMUSCULAR; INTRAVENOUS; SUBCUTANEOUS AS NEEDED
Status: DISCONTINUED | OUTPATIENT
Start: 2017-11-15 | End: 2017-11-15 | Stop reason: HOSPADM

## 2017-11-15 RX ORDER — SODIUM CHLORIDE 0.9 % (FLUSH) 0.9 %
5-10 SYRINGE (ML) INJECTION EVERY 8 HOURS
Status: DISCONTINUED | OUTPATIENT
Start: 2017-11-15 | End: 2017-11-17

## 2017-11-15 RX ORDER — MIDAZOLAM HYDROCHLORIDE 1 MG/ML
INJECTION, SOLUTION INTRAMUSCULAR; INTRAVENOUS AS NEEDED
Status: DISCONTINUED | OUTPATIENT
Start: 2017-11-15 | End: 2017-11-15 | Stop reason: HOSPADM

## 2017-11-15 RX ORDER — OXYCODONE HYDROCHLORIDE 5 MG/1
5 TABLET ORAL
Status: DISCONTINUED | OUTPATIENT
Start: 2017-11-15 | End: 2017-11-17

## 2017-11-15 RX ORDER — ROCURONIUM BROMIDE 10 MG/ML
INJECTION, SOLUTION INTRAVENOUS AS NEEDED
Status: DISCONTINUED | OUTPATIENT
Start: 2017-11-15 | End: 2017-11-15 | Stop reason: HOSPADM

## 2017-11-15 RX ORDER — FENTANYL CITRATE 50 UG/ML
50 INJECTION, SOLUTION INTRAMUSCULAR; INTRAVENOUS AS NEEDED
Status: DISCONTINUED | OUTPATIENT
Start: 2017-11-15 | End: 2017-11-15 | Stop reason: HOSPADM

## 2017-11-15 RX ORDER — HYDROMORPHONE HYDROCHLORIDE 1 MG/ML
0.5 INJECTION, SOLUTION INTRAMUSCULAR; INTRAVENOUS; SUBCUTANEOUS
Status: DISCONTINUED | OUTPATIENT
Start: 2017-11-15 | End: 2017-11-15 | Stop reason: HOSPADM

## 2017-11-15 RX ORDER — MORPHINE SULFATE 10 MG/ML
2 INJECTION, SOLUTION INTRAMUSCULAR; INTRAVENOUS
Status: DISCONTINUED | OUTPATIENT
Start: 2017-11-15 | End: 2017-11-15 | Stop reason: HOSPADM

## 2017-11-15 RX ORDER — GLYCOPYRROLATE 0.2 MG/ML
INJECTION INTRAMUSCULAR; INTRAVENOUS AS NEEDED
Status: DISCONTINUED | OUTPATIENT
Start: 2017-11-15 | End: 2017-11-15 | Stop reason: HOSPADM

## 2017-11-15 RX ORDER — SODIUM CHLORIDE 0.9 % (FLUSH) 0.9 %
5-10 SYRINGE (ML) INJECTION EVERY 8 HOURS
Status: DISCONTINUED | OUTPATIENT
Start: 2017-11-16 | End: 2017-11-17

## 2017-11-15 RX ORDER — TRIAMCINOLONE ACETONIDE 1 MG/G
CREAM TOPICAL
Status: DISCONTINUED | OUTPATIENT
Start: 2017-11-15 | End: 2017-11-22 | Stop reason: HOSPADM

## 2017-11-15 RX ORDER — SODIUM CHLORIDE 0.9 % (FLUSH) 0.9 %
5-10 SYRINGE (ML) INJECTION AS NEEDED
Status: DISCONTINUED | OUTPATIENT
Start: 2017-11-15 | End: 2017-11-15 | Stop reason: HOSPADM

## 2017-11-15 RX ORDER — MIDAZOLAM HYDROCHLORIDE 1 MG/ML
1 INJECTION, SOLUTION INTRAMUSCULAR; INTRAVENOUS AS NEEDED
Status: DISCONTINUED | OUTPATIENT
Start: 2017-11-15 | End: 2017-11-15 | Stop reason: HOSPADM

## 2017-11-15 RX ORDER — DIPHENHYDRAMINE HYDROCHLORIDE 50 MG/ML
12.5 INJECTION, SOLUTION INTRAMUSCULAR; INTRAVENOUS AS NEEDED
Status: DISCONTINUED | OUTPATIENT
Start: 2017-11-15 | End: 2017-11-15 | Stop reason: HOSPADM

## 2017-11-15 RX ORDER — DEXMEDETOMIDINE HYDROCHLORIDE 4 UG/ML
INJECTION, SOLUTION INTRAVENOUS AS NEEDED
Status: DISCONTINUED | OUTPATIENT
Start: 2017-11-15 | End: 2017-11-15 | Stop reason: HOSPADM

## 2017-11-15 RX ORDER — KETOTIFEN FUMARATE 0.35 MG/ML
1 SOLUTION/ DROPS OPHTHALMIC
Status: DISCONTINUED | OUTPATIENT
Start: 2017-11-15 | End: 2017-11-22 | Stop reason: HOSPADM

## 2017-11-15 RX ORDER — ONDANSETRON 2 MG/ML
4 INJECTION INTRAMUSCULAR; INTRAVENOUS AS NEEDED
Status: DISCONTINUED | OUTPATIENT
Start: 2017-11-15 | End: 2017-11-15 | Stop reason: HOSPADM

## 2017-11-15 RX ORDER — VANCOMYCIN HYDROCHLORIDE 1 G/20ML
INJECTION, POWDER, LYOPHILIZED, FOR SOLUTION INTRAVENOUS AS NEEDED
Status: DISCONTINUED | OUTPATIENT
Start: 2017-11-15 | End: 2017-11-15 | Stop reason: HOSPADM

## 2017-11-15 RX ORDER — CEFAZOLIN SODIUM/WATER 2 G/20 ML
2 SYRINGE (ML) INTRAVENOUS EVERY 8 HOURS
Status: COMPLETED | OUTPATIENT
Start: 2017-11-16 | End: 2017-11-16

## 2017-11-15 RX ORDER — ONDANSETRON 2 MG/ML
INJECTION INTRAMUSCULAR; INTRAVENOUS AS NEEDED
Status: DISCONTINUED | OUTPATIENT
Start: 2017-11-15 | End: 2017-11-15 | Stop reason: HOSPADM

## 2017-11-15 RX ORDER — DEXAMETHASONE SODIUM PHOSPHATE 4 MG/ML
INJECTION, SOLUTION INTRA-ARTICULAR; INTRALESIONAL; INTRAMUSCULAR; INTRAVENOUS; SOFT TISSUE AS NEEDED
Status: DISCONTINUED | OUTPATIENT
Start: 2017-11-15 | End: 2017-11-15 | Stop reason: HOSPADM

## 2017-11-15 RX ORDER — FEXOFENADINE HCL AND PSEUDOEPHEDRINE HCI 180; 240 MG/1; MG/1
1 TABLET, EXTENDED RELEASE ORAL DAILY
Status: ON HOLD | COMMUNITY
End: 2017-11-16

## 2017-11-15 RX ORDER — PHENYLEPHRINE HCL IN 0.9% NACL 30MG/250ML
10-300 PLASTIC BAG, INJECTION (ML) INTRAVENOUS
Status: DISCONTINUED | OUTPATIENT
Start: 2017-11-15 | End: 2017-11-15 | Stop reason: HOSPADM

## 2017-11-15 RX ORDER — FENTANYL CITRATE 50 UG/ML
25 INJECTION, SOLUTION INTRAMUSCULAR; INTRAVENOUS
Status: DISCONTINUED | OUTPATIENT
Start: 2017-11-15 | End: 2017-11-15 | Stop reason: HOSPADM

## 2017-11-15 RX ORDER — SODIUM CHLORIDE 0.9 % (FLUSH) 0.9 %
5-10 SYRINGE (ML) INJECTION AS NEEDED
Status: DISCONTINUED | OUTPATIENT
Start: 2017-11-15 | End: 2017-11-22 | Stop reason: HOSPADM

## 2017-11-15 RX ORDER — ACETAMINOPHEN 325 MG/1
650 TABLET ORAL EVERY 6 HOURS
Status: DISCONTINUED | OUTPATIENT
Start: 2017-11-15 | End: 2017-11-17

## 2017-11-15 RX ORDER — AMOXICILLIN 250 MG
1 CAPSULE ORAL 2 TIMES DAILY
Status: DISCONTINUED | OUTPATIENT
Start: 2017-11-15 | End: 2017-11-22 | Stop reason: HOSPADM

## 2017-11-15 RX ORDER — FENTANYL CITRATE 50 UG/ML
INJECTION, SOLUTION INTRAMUSCULAR; INTRAVENOUS AS NEEDED
Status: DISCONTINUED | OUTPATIENT
Start: 2017-11-15 | End: 2017-11-15 | Stop reason: HOSPADM

## 2017-11-15 RX ORDER — LIDOCAINE HYDROCHLORIDE 20 MG/ML
INJECTION, SOLUTION EPIDURAL; INFILTRATION; INTRACAUDAL; PERINEURAL AS NEEDED
Status: DISCONTINUED | OUTPATIENT
Start: 2017-11-15 | End: 2017-11-15 | Stop reason: HOSPADM

## 2017-11-15 RX ORDER — DIPHENHYDRAMINE HYDROCHLORIDE 50 MG/ML
12.5 INJECTION, SOLUTION INTRAMUSCULAR; INTRAVENOUS
Status: DISPENSED | OUTPATIENT
Start: 2017-11-15 | End: 2017-11-19

## 2017-11-15 RX ORDER — PROPOFOL 10 MG/ML
INJECTION, EMULSION INTRAVENOUS
Status: DISCONTINUED | OUTPATIENT
Start: 2017-11-15 | End: 2017-11-15 | Stop reason: HOSPADM

## 2017-11-15 RX ORDER — MIDAZOLAM HYDROCHLORIDE 1 MG/ML
0.5 INJECTION, SOLUTION INTRAMUSCULAR; INTRAVENOUS
Status: DISCONTINUED | OUTPATIENT
Start: 2017-11-15 | End: 2017-11-15 | Stop reason: HOSPADM

## 2017-11-15 RX ORDER — ROPIVACAINE HYDROCHLORIDE 5 MG/ML
30 INJECTION, SOLUTION EPIDURAL; INFILTRATION; PERINEURAL AS NEEDED
Status: DISCONTINUED | OUTPATIENT
Start: 2017-11-15 | End: 2017-11-15 | Stop reason: HOSPADM

## 2017-11-15 RX ORDER — ALBUMIN HUMAN 50 G/1000ML
SOLUTION INTRAVENOUS AS NEEDED
Status: DISCONTINUED | OUTPATIENT
Start: 2017-11-15 | End: 2017-11-15 | Stop reason: HOSPADM

## 2017-11-15 RX ORDER — SUCCINYLCHOLINE CHLORIDE 20 MG/ML
INJECTION INTRAMUSCULAR; INTRAVENOUS AS NEEDED
Status: DISCONTINUED | OUTPATIENT
Start: 2017-11-15 | End: 2017-11-15 | Stop reason: HOSPADM

## 2017-11-15 RX ORDER — MEDROXYPROGESTERONE ACETATE 2.5 MG/1
2.5 TABLET ORAL DAILY
COMMUNITY
End: 2020-11-05

## 2017-11-15 RX ORDER — ACETAMINOPHEN 10 MG/ML
INJECTION, SOLUTION INTRAVENOUS AS NEEDED
Status: DISCONTINUED | OUTPATIENT
Start: 2017-11-15 | End: 2017-11-15 | Stop reason: HOSPADM

## 2017-11-15 RX ORDER — SODIUM CHLORIDE 0.9 % (FLUSH) 0.9 %
5-10 SYRINGE (ML) INJECTION EVERY 8 HOURS
Status: DISCONTINUED | OUTPATIENT
Start: 2017-11-15 | End: 2017-11-15 | Stop reason: HOSPADM

## 2017-11-15 RX ORDER — NALOXONE HYDROCHLORIDE 0.4 MG/ML
0.4 INJECTION, SOLUTION INTRAMUSCULAR; INTRAVENOUS; SUBCUTANEOUS AS NEEDED
Status: DISCONTINUED | OUTPATIENT
Start: 2017-11-15 | End: 2017-11-22 | Stop reason: HOSPADM

## 2017-11-15 RX ORDER — HYDROMORPHONE HYDROCHLORIDE 1 MG/ML
INJECTION, SOLUTION INTRAMUSCULAR; INTRAVENOUS; SUBCUTANEOUS AS NEEDED
Status: DISCONTINUED | OUTPATIENT
Start: 2017-11-15 | End: 2017-11-15 | Stop reason: HOSPADM

## 2017-11-15 RX ORDER — ESTERIFIED ESTROGEN AND METHYLTESTOSTERONE .625; 1.25 MG/1; MG/1
1 TABLET ORAL DAILY
COMMUNITY
End: 2020-11-05

## 2017-11-15 RX ORDER — DIAZEPAM 5 MG/1
5 TABLET ORAL
Status: DISCONTINUED | OUTPATIENT
Start: 2017-11-15 | End: 2017-11-16

## 2017-11-15 RX ORDER — SODIUM CHLORIDE 9 MG/ML
INJECTION, SOLUTION INTRAVENOUS
Status: DISCONTINUED | OUTPATIENT
Start: 2017-11-15 | End: 2017-11-15 | Stop reason: HOSPADM

## 2017-11-15 RX ORDER — SODIUM CHLORIDE 9 MG/ML
125 INJECTION, SOLUTION INTRAVENOUS CONTINUOUS
Status: DISPENSED | OUTPATIENT
Start: 2017-11-15 | End: 2017-11-16

## 2017-11-15 RX ORDER — PROPOFOL 10 MG/ML
INJECTION, EMULSION INTRAVENOUS AS NEEDED
Status: DISCONTINUED | OUTPATIENT
Start: 2017-11-15 | End: 2017-11-15 | Stop reason: HOSPADM

## 2017-11-15 RX ORDER — SODIUM CHLORIDE 9 MG/ML
25 INJECTION, SOLUTION INTRAVENOUS CONTINUOUS
Status: DISCONTINUED | OUTPATIENT
Start: 2017-11-15 | End: 2017-11-15 | Stop reason: HOSPADM

## 2017-11-15 RX ORDER — MAGNESIUM SULFATE HEPTAHYDRATE 40 MG/ML
INJECTION, SOLUTION INTRAVENOUS AS NEEDED
Status: DISCONTINUED | OUTPATIENT
Start: 2017-11-15 | End: 2017-11-15 | Stop reason: HOSPADM

## 2017-11-15 RX ORDER — FACIAL-BODY WIPES
10 EACH TOPICAL DAILY PRN
Status: DISCONTINUED | OUTPATIENT
Start: 2017-11-17 | End: 2017-11-21

## 2017-11-15 RX ADMIN — MAGNESIUM SULFATE HEPTAHYDRATE 2 G: 40 INJECTION, SOLUTION INTRAVENOUS at 08:18

## 2017-11-15 RX ADMIN — PROPOFOL 100 MCG/KG/MIN: 10 INJECTION, EMULSION INTRAVENOUS at 07:54

## 2017-11-15 RX ADMIN — SUCCINYLCHOLINE CHLORIDE 60 MG: 20 INJECTION INTRAMUSCULAR; INTRAVENOUS at 10:36

## 2017-11-15 RX ADMIN — GLYCOPYRROLATE 0.4 MG: 0.2 INJECTION INTRAMUSCULAR; INTRAVENOUS at 17:00

## 2017-11-15 RX ADMIN — MIDAZOLAM HYDROCHLORIDE 2 MG: 1 INJECTION, SOLUTION INTRAMUSCULAR; INTRAVENOUS at 07:41

## 2017-11-15 RX ADMIN — GLYCOPYRROLATE 0.2 MG: 0.2 INJECTION INTRAMUSCULAR; INTRAVENOUS at 08:02

## 2017-11-15 RX ADMIN — HYDROMORPHONE HYDROCHLORIDE 0.5 MG: 1 INJECTION, SOLUTION INTRAMUSCULAR; INTRAVENOUS; SUBCUTANEOUS at 13:33

## 2017-11-15 RX ADMIN — Medication: at 19:38

## 2017-11-15 RX ADMIN — ATROPINE SULFATE 0.2 MG: 0.4 INJECTION, SOLUTION ENDOTRACHEAL; INTRAMEDULLARY; INTRAMUSCULAR; INTRAVENOUS; SUBCUTANEOUS at 08:05

## 2017-11-15 RX ADMIN — ACETAMINOPHEN 1000 MG: 10 INJECTION, SOLUTION INTRAVENOUS at 16:03

## 2017-11-15 RX ADMIN — Medication 2 G: at 15:09

## 2017-11-15 RX ADMIN — SODIUM CHLORIDE, SODIUM LACTATE, POTASSIUM CHLORIDE, AND CALCIUM CHLORIDE 100 ML/HR: 600; 310; 30; 20 INJECTION, SOLUTION INTRAVENOUS at 07:23

## 2017-11-15 RX ADMIN — Medication 10 ML: at 22:33

## 2017-11-15 RX ADMIN — FENTANYL CITRATE 50 MCG: 50 INJECTION, SOLUTION INTRAMUSCULAR; INTRAVENOUS at 10:57

## 2017-11-15 RX ADMIN — ALBUMIN HUMAN 250 ML: 50 SOLUTION INTRAVENOUS at 14:00

## 2017-11-15 RX ADMIN — FENTANYL CITRATE 100 MCG: 50 INJECTION, SOLUTION INTRAMUSCULAR; INTRAVENOUS at 07:48

## 2017-11-15 RX ADMIN — Medication 2 G: at 10:59

## 2017-11-15 RX ADMIN — DEXAMETHASONE SODIUM PHOSPHATE 8 MG: 4 INJECTION, SOLUTION INTRA-ARTICULAR; INTRALESIONAL; INTRAMUSCULAR; INTRAVENOUS; SOFT TISSUE at 08:08

## 2017-11-15 RX ADMIN — HYDROMORPHONE HYDROCHLORIDE 0.5 MG: 1 INJECTION, SOLUTION INTRAMUSCULAR; INTRAVENOUS; SUBCUTANEOUS at 11:00

## 2017-11-15 RX ADMIN — MIDAZOLAM HYDROCHLORIDE 2 MG: 1 INJECTION, SOLUTION INTRAMUSCULAR; INTRAVENOUS at 07:28

## 2017-11-15 RX ADMIN — ALBUMIN HUMAN 250 ML: 50 SOLUTION INTRAVENOUS at 14:30

## 2017-11-15 RX ADMIN — SODIUM CHLORIDE, SODIUM LACTATE, POTASSIUM CHLORIDE, CALCIUM CHLORIDE: 600; 310; 30; 20 INJECTION, SOLUTION INTRAVENOUS at 17:15

## 2017-11-15 RX ADMIN — Medication 2 G: at 07:58

## 2017-11-15 RX ADMIN — SODIUM CHLORIDE, SODIUM LACTATE, POTASSIUM CHLORIDE, CALCIUM CHLORIDE: 600; 310; 30; 20 INJECTION, SOLUTION INTRAVENOUS at 07:24

## 2017-11-15 RX ADMIN — LIDOCAINE HYDROCHLORIDE 60 MG: 20 INJECTION, SOLUTION EPIDURAL; INFILTRATION; INTRACAUDAL; PERINEURAL at 07:48

## 2017-11-15 RX ADMIN — ONDANSETRON 4 MG: 2 INJECTION INTRAMUSCULAR; INTRAVENOUS at 18:06

## 2017-11-15 RX ADMIN — PROPOFOL: 10 INJECTION, EMULSION INTRAVENOUS at 12:03

## 2017-11-15 RX ADMIN — KETAMINE HYDROCHLORIDE 25 MG: 10 INJECTION, SOLUTION INTRAMUSCULAR; INTRAVENOUS at 08:20

## 2017-11-15 RX ADMIN — SODIUM CHLORIDE: 9 INJECTION, SOLUTION INTRAVENOUS at 07:55

## 2017-11-15 RX ADMIN — FENTANYL CITRATE 50 MCG: 50 INJECTION, SOLUTION INTRAMUSCULAR; INTRAVENOUS at 07:28

## 2017-11-15 RX ADMIN — PROPOFOL 200 MG: 10 INJECTION, EMULSION INTRAVENOUS at 07:48

## 2017-11-15 RX ADMIN — ALBUMIN HUMAN 250 ML: 50 SOLUTION INTRAVENOUS at 12:25

## 2017-11-15 RX ADMIN — PROPOFOL: 10 INJECTION, EMULSION INTRAVENOUS at 14:13

## 2017-11-15 RX ADMIN — ALBUMIN HUMAN 250 ML: 50 SOLUTION INTRAVENOUS at 11:49

## 2017-11-15 RX ADMIN — FENTANYL CITRATE 50 MCG: 50 INJECTION, SOLUTION INTRAMUSCULAR; INTRAVENOUS at 08:20

## 2017-11-15 RX ADMIN — SUCCINYLCHOLINE CHLORIDE 140 MG: 20 INJECTION INTRAMUSCULAR; INTRAVENOUS at 07:48

## 2017-11-15 RX ADMIN — ATROPINE SULFATE 0.2 MG: 0.4 INJECTION, SOLUTION ENDOTRACHEAL; INTRAMEDULLARY; INTRAMUSCULAR; INTRAVENOUS; SUBCUTANEOUS at 11:40

## 2017-11-15 RX ADMIN — SODIUM CHLORIDE 125 ML/HR: 900 INJECTION, SOLUTION INTRAVENOUS at 22:32

## 2017-11-15 RX ADMIN — Medication 2 G: at 16:58

## 2017-11-15 RX ADMIN — ROCURONIUM BROMIDE 10 MG: 10 INJECTION, SOLUTION INTRAVENOUS at 07:48

## 2017-11-15 RX ADMIN — DEXMEDETOMIDINE HYDROCHLORIDE 20 MCG: 4 INJECTION, SOLUTION INTRAVENOUS at 15:50

## 2017-11-15 NOTE — IP AVS SNAPSHOT
[address] 
[phone] Patient: Shabnam Juan Pablo MRN: [id number] DQL:6/28/4688 About your hospitalization You were admitted on:  November 15, 2017 You last received care in the:  [address] [place] [address] You were discharged on:  November 22, 2017 Why you were hospitalized Your primary diagnosis was:  Not on File Your diagnoses also included:  Kyphoscoliosis Things You Need To Do (next 8 weeks) Monday Dec 11, 2017 Follow up with Freddy Puente MD  
Hospital follow up PCP appointment on Monday, 12/11/17 @ 11:20 a.m. Phone:  [phone] Where:  [facility], [address] [place] [id number] Discharge Orders None A check kaur indicates which time of day the medication should be taken. My Medications TAKE these medications as instructed Instructions Each Dose to Equal  
 Morning Noon Evening Bedtime AYR SALINE 0.65 % nasal spray Generic drug:  sodium chloride Your last dose was: Your next dose is:    
   
   
 1 Spray as needed for Congestion. 1 [place] COVARYX H.S. 0.625-1.25 mg per tablet Generic drug:  estrogens (conjugated)-methylTESTOSTERone Your last dose was: Your next dose is: Take 1 Tab by mouth daily. Indications: VASOMOTOR SYMPTOMS ASSOCIATED WITH MENOPAUSE  
 1 Tab  
    
   
   
   
  
 desoximetasone 0.25 % topical cream  
Commonly known as:  TOPICORT Your last dose was: Your next dose is:    
   
   
 Apply  to affected area as needed for Skin Irritation. EYE ITCH RELIEF 0.025 % (0.035 %) ophthalmic solution Generic drug:  ketotifen Your last dose was: Your next dose is:    
   
   
 Administer 1 Drop to both eyes as needed. 1 Drop  
    
   
   
   
  
 medroxyPROGESTERone 2.5 mg tablet Commonly known as:  PROVERA Your last dose was: Your next dose is: Take 2.5 mg by mouth daily. 2.5 mg  
    
   
   
   
  
 NAPHCON-A 0.025-0.3 % ophthalmic solution Generic drug:  naphazoline-pheniramine Your last dose was: Your next dose is:    
   
   
 Administer  to both eyes as needed. SYNTHROID 88 mcg tablet Generic drug:  levothyroxine Your last dose was: Your next dose is: Take 88 mcg by mouth Daily (before breakfast). 88 mcg ASK your physician about these medications Instructions Each Dose to Equal  
 Morning Noon Evening Bedtime ALEVE 220 mg Cap Generic drug:  naproxen sodium Your last dose was: Your next dose is: Take 1 Cap by mouth as needed. 1 Cap ENRIKE-SELTZER HEARTBURN+-80 mg AdScoot Generic drug:  calcium carbonate-simethicone Your last dose was: Your next dose is: Take  by mouth nightly as needed. ALLEGRA-D 24 HOUR 180-240 mg per tablet Generic drug:  fexofenadine-pseudoephedrine Your last dose was: Your next dose is: Take 1 Tab by mouth daily. Indications: Allergic Rhinitis 1 Tab AYR SALINE topical gel Generic drug:  sodium chloride-aloe vera Your last dose was: Your next dose is:    
   
   
 Apply  to affected area as needed. Benefiber Clear 3 gram/3.5 gram Pwpk Generic drug:  Wheat Dextrin Your last dose was: Your next dose is: Take  by mouth Daily (before breakfast). CENTRUM SILVER 0.4-300-250 mg-mcg-mcg Tab Generic drug:  multivit-min-FA-lycopen-lutein Your last dose was: Your next dose is: Take 1 Tab by mouth daily. 1 Tab CULTURELLE PROBIOTICS PO Your last dose was: Your next dose is: Take 1 Tab by mouth daily. [address] 1 Tab  
    
   
   
   
  
 metFORMIN 500 mg Tg24 24 hour tablet Commonly known asDrew Abt ER Your last dose was: Your next dose is: Take 1 Tab by mouth two (2) times a day. AFTER BREAKFAST AND DINNER  
 1 Tab  
    
   
   
   
  
 OAT BRAN PO Your last dose was: Your next dose is: Take 1 Tab by mouth daily. 1 Tab OMEGA 3 FISH OIL PO Your last dose was: Your next dose is: Take 1 Tab by mouth daily. 180 EPA, 120 DHA  
 1 Tab  
    
   
   
   
  
 raNITIdine hcl 150 mg capsule Your last dose was: Your next dose is: Take 150 mg by mouth nightly as needed for Indigestion. 150 mg  
    
   
   
   
  
 SYSTANE (PROPYLENE GLYCOL) 0.4-0.3 % Drop Generic drug:  peg 400-propylene glycol Your last dose was: Your next dose is:    
   
   
 Administer  to left eye as needed. triamcinolone acetonide 0.1 % ointment Commonly known as:  KENALOG Your last dose was: Your next dose is:    
   
   
 Apply  to affected area as needed for Skin Irritation. use thin layer VITAMIN D3 1,000 unit tablet Generic drug:  cholecalciferol Your last dose was: Your next dose is: Take 1,000 Units by mouth daily. 1000 Units  
    
   
   
   
  
 warfarin 5 mg tablet Commonly known as:  COUMADIN Your last dose was: Your next dose is: Take 5 mg by mouth daily. 5 mg Discharge Instructions [facility], LTDROMIE Rueda P.A.-C Lise Bathe, PA-C 
[phone] Lumbar Surgery Discharge Instructions Activities 1. You are going home a well person, be as active as possible.   Your only exercise should be walking. Start with short frequent walks and increase your walking distance each day. Limit the amount of time you sit to 20-30 minute intervals. Sitting for prolonged periods of time will be uncomfortable for you following your surgery. 2. Do not lift anything over five pounds. 3. Do not do any straining, twisting or bending. 4. When you are in the bed, you may lay on your back or on either side. Do not lay on your stomach. Diet ? You may resume your normal diet. If your throat is sore, you may want to eat soft foods for a few days. Be sure to drink plenty of fluids, it is important to keep yourself hydrated. ? Avoid alcoholic beverages and tobacco products. Medications 1. Do not take anti-inflammatory medications or aspirin unless instructed by your physician. 2. Take your pain medication as directed. 3. It is important to have regular bowel movements. Pain medications may cause constipation. Stool softeners, prune juice, and increasing your water and fiber intake may help in preventing constipation. 4. Laxatives should only be used if the above preventative measures have failed and you still have not had a bowel movement after three days. Driving You may not drive or return to work until instructed by your physician. However, you may ride in the car for short periods of time. Brace ? If you have a back brace, you should wear your brace at all times when you are out of bed. Do not wear the brace while in bed or showering. ? Remember to always wear a cotton t-shirt underneath your brace. ? Do not bend or twist when your brace is off. Showering 1. You may shower with the Prineo dressing in place - it is designed to be watertight. 2. Leave the dressing on during your shower allowing the water to run over it. 3. Reminder- your brace can be removed while showering. Remember to not bend or twist while your brace is off. 4. Do not take a tub bath. Caring for your incision 1. You have a new dressing applied to your incision called a Prineo dressing, covered with gauze. You should leave this dressing in place until you are seen in the office in two weeks. 2. You may have steri strips on your incision (small, white pieces of tape). Do not pull the steri strips; they will fall off on their own after several days. If you have sutures or staples, they will be removed when you see your physician. 3. Do not rub or apply any lotions or ointments to your incision site. Stockings You have been given T.E.D. stockings to wear. Continue to wear these for 7 days after your discharge. Put them on in the morning and take them off at night. (You may require some assistance with this task as you may be restricted in your bending.)  They are used to increase your circulation and prevent blood clots from forming in your legs. Follow Up Once you are home, call your physicians office to schedule an appointment for your two-week postoperative visit. Contact Dr. Evelyn Arroyo at [phone], [phone]. Notify your physician if you develop any of the following conditions: 1. Fever above 101 degrees for 24 hours. 2. Nausea or vomiting. 3. Severe headache. 4. Inability to urinate. 5. Loss of bowel or bladder function. 6. Changes in sensation in your extremities (numbness, tingling, loss of color) that was not present before your surgery. 7. Severe pain or pain not relieved by medications. 8. Redness, swelling, or drainage from your incision. 9. Persistent pain in the chest or calf of either leg. 10. Increased weakness (if this is greater than before your surgery). [place] [phone] (after hours call [phone]) Dr. Andre Cota Announcement  We are excited to announce that we are making your provider's discharge notes available to you in SoLatina. You will see these notes when they are completed and signed by the physician that discharged you from your recent hospital stay. If you have any questions or concerns about any information you see in SoLatina, please call the Health Information Department where you were seen or reach out to your Primary Care Provider for more information about your plan of care. Introducing [facility]! Dear Lilian Yanes: Thank you for requesting a SoLatina account. Our records indicate that you already have an active SoLatina account. You can access your account anytime at [email or web address] Did you know that you can access your hospital and ER discharge instructions at any time in SoLatina? You can also review all of your test results from your hospital stay or ER visit. Additional Information If you have questions, please visit the Frequently Asked Questions section of the SoLatina website at [email or web address] Remember, SoLatina is NOT to be used for urgent needs. For medical emergencies, dial 911. Now available from your iPhone and Android! Providers Seen During Your Hospitalization Provider Specialty Primary office phone Nehemias Castellon MD Orthopedic Surgery [phone] Your Primary Care Physician (PCP) Primary Care Physician Office Phone Office Fax [facility] [place] [phone] You are allergic to the following Allergen Reactions Bactrim (Sulfamethoprim) Swelling \"LIP SWELLING\" Recent Documentation Height Weight BMI OB Status Smoking Status 1.676 m 87.6 kg 31.17 kg/m2 Postmenopausal Never Smoker Emergency Contacts Name Discharge Info Relation Home Work Mobile [address] CAREGIVER [3] Spouse [3] [phone] Patient Belongings The following personal items are in your possession at time of discharge: Dental Appliances: None  Visual Aid: Glasses, At bedside   Hearing Aids/Status: Bilateral  Home Medications: None   Jewelry: None  Clothing: With patient    Other Valuables: Sarah Hidalgo, With patient Please provide this summary of care documentation to your next provider. Signatures-by signing, you are acknowledging that this After Visit Summary has been reviewed with you and you have received a copy. Patient Signature:  ____________________________________________________________ Date:  ____________________________________________________________  
  
[facility] Provider Signature:  ____________________________________________________________ Date:  ____________________________________________________________

## 2017-11-15 NOTE — IP AVS SNAPSHOT
[address] 
[phone] Patient: Nallely Ndiaye MRN: [id number] DAQ:4/37/5470 My Medications ASK your physician about these medications Instructions Each Dose to Equal  
 Morning Noon Evening Bedtime ALEVE 220 mg Cap Generic drug:  naproxen sodium Your last dose was: Your next dose is: Take 1 Cap by mouth as needed. 1 Cap ENRIKE-SELTZER HEARTBURN+-80 mg Ipsat [employer]rapies Generic drug:  calcium carbonate-simethicone Your last dose was: Your next dose is: Take  by mouth nightly as needed. ALLEGRA-D 24 HOUR 180-240 mg per tablet Generic drug:  fexofenadine-pseudoephedrine Your last dose was: Your next dose is: Take 1 Tab by mouth daily. Indications: Allergic Rhinitis 1 Tab AYR SALINE 0.65 % nasal spray Generic drug:  sodium chloride Your last dose was: Your next dose is:    
   
   
 1 Spray as needed for Congestion. 1 Spray AYR SALINE topical gel Generic drug:  sodium chloride-aloe vera Your last dose was: Your next dose is:    
   
   
 Apply  to affected area as needed. Benefiber Clear 3 gram/3.5 gram Pwpk Generic drug:  Wheat Dextrin Your last dose was: Your next dose is: Take  by mouth Daily (before breakfast). CENTRUM SILVER 0.4-300-250 mg-mcg-mcg Tab Generic drug:  multivit-min-FA-lycopen-lutein Your last dose was: Your next dose is: Take 1 Tab by mouth daily. 1 Tab COVARYX H.S. 0.625-1.25 mg per tablet Generic drug:  estrogens (conjugated)-methylTESTOSTERone Your last dose was: Your next dose is: Take 1 Tab by mouth daily. Indications: VASOMOTOR SYMPTOMS ASSOCIATED WITH MENOPAUSE  
 1 Tab CULTURELLE PROBIOTICS PO Your last dose was: Your next dose is: Take 1 Tab by mouth daily. [address] 1 Tab  
    
   
   
   
  
 desoximetasone 0.25 % topical cream  
Commonly known as:  Percell Red Your last dose was: Your next dose is:    
   
   
 Apply  to affected area as needed for Skin Irritation. EYE ITCH RELIEF 0.025 % (0.035 %) ophthalmic solution Generic drug:  ketotifen Your last dose was: Your next dose is:    
   
   
 Administer 1 Drop to both eyes as needed. 1 Drop  
    
   
   
   
  
 medroxyPROGESTERone 2.5 mg tablet Commonly known as:  PROVERA Your last dose was: Your next dose is: Take 2.5 mg by mouth daily. 2.5 mg  
    
   
   
   
  
 metFORMIN 500 mg Tg24 24 hour tablet Commonly known asVergie Felty ER Your last dose was: Your next dose is: Take 1 Tab by mouth two (2) times a day. AFTER BREAKFAST AND DINNER  
 1 Tab NAPHCON-A 0.025-0.3 % ophthalmic solution Generic drug:  naphazoline-pheniramine Your last dose was: Your next dose is:    
   
   
 Administer  to both eyes as needed. OAT BRAN PO Your last dose was: Your next dose is: Take 1 Tab by mouth daily. 1 Tab OMEGA 3 FISH OIL PO Your last dose was: Your next dose is: Take 1 Tab by mouth daily. 180 EPA, 120 DHA  
 1 Tab  
    
   
   
   
  
 raNITIdine hcl 150 mg capsule Your last dose was: Your next dose is: Take 150 mg by mouth nightly as needed for Indigestion. 150 mg SYNTHROID 88 mcg tablet Generic drug:  levothyroxine Your last dose was: Your next dose is: Take 88 mcg by mouth Daily (before breakfast). 88 mcg SYSTANE (PROPYLENE GLYCOL) 0.4-0.3 % Drop Generic drug:  peg 400-propylene glycol Your last dose was: Your next dose is:    
   
   
 Administer  to left eye as needed. triamcinolone acetonide 0.1 % ointment Commonly known as:  KENALOG Your last dose was: Your next dose is:    
   
   
 Apply  to affected area as needed for Skin Irritation. use thin layer VITAMIN D3 1,000 unit tablet Generic drug:  cholecalciferol Your last dose was: Your next dose is: Take 1,000 Units by mouth daily. 1000 Units  
    
   
   
   
  
 warfarin 5 mg tablet Commonly known as:  COUMADIN Your last dose was: Your next dose is: Take 5 mg by mouth daily. 5 mg

## 2017-11-15 NOTE — ANESTHESIA PROCEDURE NOTES
Central Line Placement    Start time: 11/15/2017 7:22 AM  End time: 11/15/2017 7:29 AM  Performed by: Amos Barksdale  Authorized by: Leonard NETTLES     Indications: vascular access  Preanesthetic Checklist: timeout performed    Timeout Time: 07:22       Pre-procedure: All elements of maximal sterile barrier technique followed?  Yes    Maximal barrier precautions followed, 2% Chlorhexidine for cutaneous antisepsis and Hand hygiene performed prior to catheter insertion            Procedure:   Prep:  Chlorhexidine  Location:  Internal jugular  Orientation:  Right  Patient position:  Trendelenburg  Catheter type:  Quad lumen  Catheter size:  8.5 Fr  Catheter length:  16 cm  Number of attempts:  1  Successful placement: Yes      Assessment:   Post-procedure:  Catheter secured and sterile dressing applied  Assessment:  Blood return through all ports  Insertion:  Uncomplicated  Patient tolerance:  Patient tolerated the procedure well with no immediate complications

## 2017-11-15 NOTE — ANESTHESIA PREPROCEDURE EVALUATION
Anesthetic History   No history of anesthetic complications  PONV          Review of Systems / Medical History  Patient summary reviewed, nursing notes reviewed and pertinent labs reviewed    Pulmonary  Within defined limits                 Neuro/Psych   Within defined limits           Cardiovascular  Within defined limits          Dysrhythmias : atrial fibrillation      Exercise tolerance: >4 METS     GI/Hepatic/Renal  Within defined limits   GERD           Endo/Other  Within defined limits  Diabetes  Hypothyroidism  Arthritis     Other Findings              Physical Exam    Airway  Mallampati: II  TM Distance: 4 - 6 cm  Neck ROM: normal range of motion   Mouth opening: Normal     Cardiovascular  Regular rate and rhythm,  S1 and S2 normal,  no murmur, click, rub, or gallop             Dental  No notable dental hx       Pulmonary  Breath sounds clear to auscultation               Abdominal  GI exam deferred       Other Findings            Anesthetic Plan    ASA: 3  Anesthesia type: general    Monitoring Plan: CVP      Induction: Intravenous  Anesthetic plan and risks discussed with: Patient

## 2017-11-15 NOTE — PERIOP NOTES
Patient: Deepak Smith MRN: [id number]  SSN: [id number]   YOB: 1945  Age: 81 y.o. Sex: female     Patient is status post Procedure(s):  ANTERIOR SPINAL FUSION L3-S1, LUMBAR LAMINECTOMY L4-5, POSTERIOR SPINAL FUSION T4-S1. Surgeon(s) and Role:     * Melissa Haddad MD - Primary     * Jarred Luna MD - Co-Surgeon    Local/Dose/Irrigation:                Bouchra Manges Lumen 11/15/17 Right Internal jugular (Active)        Peripheral IV 11/15/17 Left Hand (Active)   Site Assessment Clean, dry, & intact 11/15/2017  7:16 AM   Phlebitis Assessment 0 11/15/2017  7:16 AM   Infiltration Assessment 0 11/15/2017  7:16 AM   Dressing Status Clean, dry, & intact; New 11/15/2017  7:16 AM   Dressing Type Tape;Transparent 11/15/2017  7:16 AM   Hub Color/Line Status Green 11/15/2017  7:16 AM          Hemovac Right Back (Active)   Site Assessment Clean, dry, & intact 11/15/2017  4:16 PM   Dressing Status Clean, dry, & intact 11/15/2017  4:16 PM   Drainage Description Sanguinous 11/15/2017  4:16 PM   Status Charged 11/15/2017  4:16 PM      Airway - Endotracheal Tube 11/15/17 Oral (Active)                   Dressing/Packing:  Wound Abdomen Mid-DRESSING TYPE: 4 x 4;ABD pad;Special tape (comment) (11/15/17 0900)  Wound Back-DRESSING TYPE: 4 x 4;Topical skin adhesive/glue; Other (Comment) (DERMABOND BEBETO) (11/15/17 1500)  Splint/Cast:  ]    Other:  16FR FLETCHER CATHETER, 10FR MEDIUM HEMOVAC DRAIN RIGHT LOWER BACK

## 2017-11-15 NOTE — OP NOTES
[address] [place] [facility] [place] [address], [address]   OP NOTE       Name:  Lyubov Galan   MR#:  [id number]   :  1945   Account #:  [de-identified]    Surgery Date:  11/15/2017   Date of Adm:  11/15/2017       PREOPERATIVE DIAGNOSIS: Lumbar spinal stenosis with adult   kyphoscoliosis. POSTOPERATIVE DIAGNOSIS: Lumbar spinal stenosis with adult   kyphoscoliosis. PROCEDURES PERFORMED: Anterior peritoneal exposure of the L3-  4, L4-5, and L5-S1 interspaces. SURGEON: Shabnam Russ MD     ASSISTANT: Devan Noel MD    ANESTHESIA: General endotracheal.    FINDINGS: That of a relatively normal venous and arterial anatomy   with the exception of a much more medially oriented left iliac vein. SPECIMENS REMOVED: None. ESTIMATED BLOOD LOSS: Minimal.    COMPLICATIONS: None. DRAINS: None. CONDITION: Good through this portion of the procedure. DESCRIPTION OF PROCEDURE: With the patient supine and   suitably anesthetized, the abdomen was prepared with ChloraPrep and   draped as a field. A midline incision was made extending about 3 cm above the   umbilicus down to just above the symphysis pubis. The intraperitoneal   space was entered and dissected free. There was one entry into the   peritoneal cavity anterolaterally, which was closed at the end of the   procedure with 3-0 Vicryl. The Omni-Tract blades were placed and the   L5-S1 interspace was interrogated. This was thought to be really   difficult dissection because of the more medially oriented right iliac   vein, that turned out as a very large middle sacral vein, which was   divided between ligatures. This afforded very good exposure of the L5-  S1 interspace. The L3-4 and L4-L5 interspaces were approached   laterally. Additionally the lumbar vein was divided between ligatures   and clips. Treatment was actually an arterial segmental vessel at this   point, which was divided with the Harmonic scalpel.  The segmental   vessels at L4 and L3 were divided with the Harmonic scalpel. Further   mobilization allowed for good exposure of both those interspaces   with little tributary veins coagulated with the Harmonic scalpel. At this   point, then, Dr. Keegan Renteria performed the diskectomies and fusions. Following completion of this, the wound was inspected carefully. Hemostasis was excellent. The peritoneal contents were allowed to   return to their normal position. At this point, I then closed the 7 cm rent   in the peritoneum with a running 3-0 Vicryl suture with good closure. The fascia was then reapproximated with running #1 Vicryl. Subcutaneous tissues were approximated with Vicryl. Skin was   approximated with staples, followed by a dressing. At the termination of this portion of the procedure, all counts were   correct. The patient tolerated this well and continued under anesthesia. The posterior portion of the procedure to be dictated by Dr. Keegan Renteria. MD Slava Garrison [facility]   D:  11/15/2017   10:21   T:  11/15/2017   14:22   Job #:  [id number]

## 2017-11-16 LAB
ANION GAP SERPL CALC-SCNC: 6 MMOL/L (ref 5–15)
BUN SERPL-MCNC: 12 MG/DL (ref 6–20)
BUN/CREAT SERPL: 19 (ref 12–20)
CALCIUM SERPL-MCNC: 7.3 MG/DL (ref 8.5–10.1)
CHLORIDE SERPL-SCNC: 109 MMOL/L (ref 97–108)
CO2 SERPL-SCNC: 26 MMOL/L (ref 21–32)
CREAT SERPL-MCNC: 0.63 MG/DL (ref 0.55–1.02)
GLUCOSE BLD STRIP.AUTO-MCNC: 111 MG/DL (ref 65–100)
GLUCOSE BLD STRIP.AUTO-MCNC: 151 MG/DL (ref 65–100)
GLUCOSE BLD STRIP.AUTO-MCNC: 90 MG/DL (ref 65–100)
GLUCOSE BLD STRIP.AUTO-MCNC: 94 MG/DL (ref 65–100)
GLUCOSE SERPL-MCNC: 142 MG/DL (ref 65–100)
HGB BLD-MCNC: 12.8 G/DL (ref 11.5–16)
HGB BLD-MCNC: 12.9 G/DL (ref 11.5–16)
HGB BLD-MCNC: 13.2 G/DL (ref 11.5–16)
HGB BLD-MCNC: 13.3 G/DL (ref 11.5–16)
HGB BLD-MCNC: 9.6 G/DL (ref 11.5–16)
POTASSIUM SERPL-SCNC: 3.9 MMOL/L (ref 3.5–5.1)
SERVICE CMNT-IMP: ABNORMAL
SERVICE CMNT-IMP: ABNORMAL
SERVICE CMNT-IMP: NORMAL
SERVICE CMNT-IMP: NORMAL
SODIUM SERPL-SCNC: 141 MMOL/L (ref 136–145)

## 2017-11-16 PROCEDURE — 85018 HEMOGLOBIN: CPT | Performed by: PHYSICIAN ASSISTANT

## 2017-11-16 PROCEDURE — 74011250637 HC RX REV CODE- 250/637: Performed by: NURSE PRACTITIONER

## 2017-11-16 PROCEDURE — 80048 BASIC METABOLIC PNL TOTAL CA: CPT | Performed by: PHYSICIAN ASSISTANT

## 2017-11-16 PROCEDURE — 82962 GLUCOSE BLOOD TEST: CPT

## 2017-11-16 PROCEDURE — 65270000029 HC RM PRIVATE

## 2017-11-16 PROCEDURE — 74011250636 HC RX REV CODE- 250/636: Performed by: NURSE PRACTITIONER

## 2017-11-16 PROCEDURE — 77010033678 HC OXYGEN DAILY

## 2017-11-16 PROCEDURE — 74011250636 HC RX REV CODE- 250/636: Performed by: PHYSICIAN ASSISTANT

## 2017-11-16 PROCEDURE — 74011250637 HC RX REV CODE- 250/637: Performed by: PHYSICIAN ASSISTANT

## 2017-11-16 PROCEDURE — 36415 COLL VENOUS BLD VENIPUNCTURE: CPT | Performed by: PHYSICIAN ASSISTANT

## 2017-11-16 RX ORDER — MEDROXYPROGESTERONE ACETATE 2.5 MG/1
2.5 TABLET ORAL DAILY
Status: DISCONTINUED | OUTPATIENT
Start: 2017-11-16 | End: 2017-11-22 | Stop reason: HOSPADM

## 2017-11-16 RX ORDER — CYCLOBENZAPRINE HCL 10 MG
5 TABLET ORAL
Status: DISCONTINUED | OUTPATIENT
Start: 2017-11-16 | End: 2017-11-22 | Stop reason: HOSPADM

## 2017-11-16 RX ORDER — ESTERIFIED ESTROGEN AND METHYLTESTOSTERONE .625; 1.25 MG/1; MG/1
1 TABLET ORAL DAILY
Status: DISCONTINUED | OUTPATIENT
Start: 2017-11-16 | End: 2017-11-22 | Stop reason: HOSPADM

## 2017-11-16 RX ORDER — IBUPROFEN 200 MG
1 TABLET ORAL DAILY
Status: DISCONTINUED | OUTPATIENT
Start: 2017-11-16 | End: 2017-11-16

## 2017-11-16 RX ORDER — HYDROMORPHONE HCL IN 0.9% NACL 15 MG/30ML
PATIENT CONTROLLED ANALGESIA VIAL INTRAVENOUS
Status: DISCONTINUED | OUTPATIENT
Start: 2017-11-16 | End: 2017-11-17

## 2017-11-16 RX ADMIN — Medication 10 ML: at 06:50

## 2017-11-16 RX ADMIN — DOCUSATE SODIUM AND SENNOSIDES 1 TABLET: 8.6; 5 TABLET, FILM COATED ORAL at 09:03

## 2017-11-16 RX ADMIN — Medication 10 ML: at 21:27

## 2017-11-16 RX ADMIN — Medication: at 16:22

## 2017-11-16 RX ADMIN — OXYCODONE HYDROCHLORIDE 10 MG: 5 TABLET ORAL at 05:14

## 2017-11-16 RX ADMIN — SODIUM CHLORIDE 125 ML/HR: 900 INJECTION, SOLUTION INTRAVENOUS at 09:10

## 2017-11-16 RX ADMIN — DOCUSATE SODIUM AND SENNOSIDES 1 TABLET: 8.6; 5 TABLET, FILM COATED ORAL at 17:35

## 2017-11-16 RX ADMIN — ACETAMINOPHEN 650 MG: 325 TABLET ORAL at 09:03

## 2017-11-16 RX ADMIN — Medication 10 ML: at 06:56

## 2017-11-16 RX ADMIN — ACETAMINOPHEN 650 MG: 325 TABLET ORAL at 16:03

## 2017-11-16 RX ADMIN — ACETAMINOPHEN 650 MG: 325 TABLET ORAL at 21:26

## 2017-11-16 RX ADMIN — Medication 2 G: at 01:51

## 2017-11-16 RX ADMIN — Medication: at 23:51

## 2017-11-16 RX ADMIN — MEDROXYPROGESTERONE ACETATE 2.5 MG: 2.5 TABLET ORAL at 10:40

## 2017-11-16 RX ADMIN — Medication 2 G: at 09:03

## 2017-11-16 RX ADMIN — ACETAMINOPHEN 650 MG: 325 TABLET ORAL at 05:14

## 2017-11-16 RX ADMIN — LEVOTHYROXINE SODIUM 88 MCG: 88 TABLET ORAL at 06:50

## 2017-11-16 RX ADMIN — Medication 10 ML: at 16:04

## 2017-11-16 RX ADMIN — CYCLOBENZAPRINE HYDROCHLORIDE 5 MG: 10 TABLET, FILM COATED ORAL at 16:03

## 2017-11-16 RX ADMIN — OXYCODONE HYDROCHLORIDE 10 MG: 5 TABLET ORAL at 18:33

## 2017-11-16 RX ADMIN — POLYETHYLENE GLYCOL 3350 17 G: 17 POWDER, FOR SOLUTION ORAL at 09:03

## 2017-11-16 NOTE — OP NOTES
[address] [facility] [place] [address], [address]   OP NOTE       Name:  Mustapha Gutierrez   MR#:  [id number]   :  1945   Account #:  [de-identified]    Surgery Date:     Date of Adm:  11/15/2017       PREOPERATIVE DIAGNOSES    1. Lumbar spinal stenosis. 2. Adult kyphoscoliosis. POSTOPERATIVE DIAGNOSES    1. Lumbar spinal stenosis. 2. Adult kyphoscoliosis. PROCEDURES PERFORMED    1. Anterior spinal fusion, L3-L4, L4-L5, L5-S1, using Globus PEEK   device supplemented with cancellous allograft and bone morphogenic   protein (Infuse). SURGEON: CONNIE Alvarado MD    ASSISTANT: Zina Carlson PA-C    ANESTHESIA: General.    INDICATIONS: A 82-year-old female seen with a symptomatic lumbar   stenosis and associated radiculopathy. She has severe degenerative   change throughout her thoracolumbar spine resulting in a fairly   significant degenerative kyphoscoliosis. Given the degree of   symptomatology, failure to improve with conservative measures, level   of dysfunction, an anterior, posterior spinal fusion now offered. Potential benefits and complications reviewed. DESCRIPTION OF PROCEDURE: The patient was brought to the   operating room in the supine position, a general anesthetic   administered. Anterior abdominal pelvic region was prepped and   draped in normal fashion. Dr. Carmen Us dictated the surgical   exposure, eventually giving me a nice visualization to the L3-L4, L4-L5   and L5-S1 inner spaces. I started at L5-S1. With the vessels   retracted, I incised the annulus. Using a series of curettes and pituitary   rongeurs, osteotomes, evacuated the entire disk at that segment. This   particular segment was quite mobile eventually. Midas Elliott mehdi used   to compare the endplates. A 13 mm lordotic cage was chosen. The   center portion of the PEEK device packed with a combination of   cancellous allograft and bone morphogenic protein.  The graft was then inserted without difficulty. Prior to inserting the graft, I had placed some   cancellous elements in a U-shaped pattern along the posterior and   lateral walls. Additional bone graft and some portion of the BMP placed   on the lateral gutters. Once this was completed, I went to L4-L5 and   L3-4, the same operative procedure completed at each segment with   good overall result. I believe I placed a 13 mm lordotic cage at L4-L5   and an 11 mm regular cage at L3-L4. Once the grafts were placed, Dr. Wilda Mayo reentered the operating room for the purposes of wound   closure. The posterior portion of this operation will be dictated   separately.         MD OCHOA Cesar / Kaitlin Doyle   D:  11/15/2017   16:19   T:  11/16/2017   09:01   Job #:  [id number]

## 2017-11-16 NOTE — OP NOTES
[address] [facility] [place] [address], [address]   OP NOTE       Name:  Abel Sanabria   MR#:  [id number]   :  1945   Account #:  [de-identified]    Surgery Date:     Date of Adm:  11/15/2017       PREOPERATIVE DIAGNOSES   1. Adult kyphoscoliosis. 2. Lumbar spinal stenosis, L4-5. POSTOPERATIVE DIAGNOSES   1. Adult kyphoscoliosis. 2. Lumbar spinal stenosis, L4-L5. PROCEDURES PERFORMED   1. Posterior spinal fusion, T4-S1, using Globus spinal instrumentation,   cancellous allograft and bone morphogenic protein. 2. Iliosacral fixation using Globus spinal instrumentation. 3. Lumbar laminectomy, L4-S1, bilateral decompression of the L5 and   S1 nerve roots. SURGEON: CONNIE Truong MD    ASSISTANT: Ron Duenas PA-C    ANESTHESIA: General.    ESTIMATED BLOOD LOSS:     SPECIMENS REMOVED:       DESCRIPTION OF PROCEDURE: After the anterior portion of the   operation was completed, the patient was placed in prone position on   gel rolls. Posterior spinal region prepped and draped in normal fashion. Preoperative antibiotics administered throughout the operation on an   every 3-hour basis. Spinal cord monitoring used throughout the   operative procedure without change in signal. An incision was made   extending from about the T3 region down to the sacrum. Subcutaneous tissues were then divided in line with the incision down   to the level of the fascia. The fascia was incised in the midline and a   subperiosteal dissection completed over the spinous process and   laminar surfaces of all segments. Deep retractors placed. Good   hemostasis obtained. The soft tissues across the interlaminar space   were excised to include the facet joint capsule at all segments. Overgrowth of any of the facet joints was well-excised using a Leksell   rongeur. Once this was completed, I placed drill holes to the pedicles   of each segment from T4 down to the sacrum.  Pedicles cannulated in a routine fashion. Nerve was stimulated to make sure there was no   aberrancy in placement of the pedicle probe. Once this was completed   bilaterally, pins were placed into the pedicle and intraoperative x-ray   taken to verify position and direction. Prior to doing this, I had   dissected above the lumbar fascia, exposing the posterior superior iliac   spine. The fascia was divided over the posterior superior iliac spine,   exposing the ilium. I took a bite out of the ilium using a Leksell rongeur   and then cannulated between the tables of the ilium. A 7.5 mm   diameter screw 80 mm in length was placed into the pelvis on both   sides with excellent purchase. The wound was irrigated thoroughly at   this point in time with 3 liters of antibiotic-containing saline. The facet   joints at each segment thoroughly decorticated. A large amount of   cancellous allograft utilized across the segments and each of the joints   packed with the bone. I then placed screws measuring anywhere from   6.5 mm in diameter to 35 mm in length up to 7.5 mm in diameter and   45 mm in length at each segment. Excellent purchase obtained at each   level. Each of the screws again electrically stimulated without any   obvious abnormal signal at any level. Starting on the patient's right   side, I then connected the screws with the poly that was appropriately   contoured into a corrected position. The distal portion of poly connected   to the iliac screw using a 30 mm connector. I then locked the distal   portion of the poly to the iliac screw and the sacral screw. In some   sequential fashion though, I used a reduction device to bring each of   the screws to the poly in the corrected position using the locking cap to   do so in a distal to proximal fashion. Excellent overall stability obtained. Final  utilized. In a similar fashion, I completed the placement   of the screws and poly on the patient's left side.  Visually we got very good correction. Intraoperative x-ray again taken showing good   position of the instrumentation at all levels. Virtually complete   correction of the patient's kyphoscoliosis. Laminectomy was completed   at L4-5, S1. There were some adhesions related to the facet joint. There was a small dural tear measuring no more than 1 to 2 mm. This   was eventually repaired, but the patient's dura was very thin   and attenuated. It seemed to tear the dura. I ended up using a fascial   graft over the area and secured it with a circumferential stitch. This   seemed to give us a watertight seal. Tisseel was placed over the area   as well. Drain was placed. Fascial layers closed using #1 Vicryl. Subcutaneous tissues and skin closed using 2-0 and 3-0 Vicryl. The patient awoke from the anesthetic, extubated and taken to   recovery in stable condition.         MD OCHOA Burrows / JENNA   D:  11/15/2017   16:24   T:  11/16/2017   09:41   Job #:  [id number]

## 2017-11-16 NOTE — PROGRESS NOTES
Problem: Falls - Risk of  Goal: *Absence of Falls  Document Cesar Fall Risk and appropriate interventions in the flowsheet.    Outcome: Progressing Towards Goal  Fall Risk Interventions:  Mobility Interventions: Communicate number of staff needed for ambulation/transfer, Mechanical lift, Patient to call before getting OOB, PT Consult for mobility concerns, PT Consult for assist device competence, Utilize walker, cane, or other assitive device         Medication Interventions: Assess postural VS orthostatic hypotension, Bed/chair exit alarm, Evaluate medications/consider consulting pharmacy, Patient to call before getting OOB, Teach patient to arise slowly, Utilize gait belt for transfers/ambulation    Elimination Interventions: Call light in reach, Patient to call for help with toileting needs, Toileting schedule/hourly rounds

## 2017-11-16 NOTE — PROGRESS NOTES
Shadow drainage noted to patient's posterior lower back dressing. Drainage exterior outlined in permanent marker to monitor.

## 2017-11-16 NOTE — ANESTHESIA POSTPROCEDURE EVALUATION
Post-Anesthesia Evaluation and Assessment    Patient: Solis Ross MRN: [id number]  SSN: [id number]    YOB: 1945  Age: 81 y.o. Sex: female       Cardiovascular Function/Vital Signs  Visit Vitals    /68    Pulse (!) 55    Temp 36.7 °C (98.1 °F)    Resp 15    Ht 5' 6\" (1.676 m)    Wt 82.3 kg (181 lb 6 oz)    SpO2 100%    BMI 29.27 kg/m2       Patient is status post general anesthesia for Procedure(s):  ANTERIOR SPINAL FUSION L3-S1, LUMBAR LAMINECTOMY L4-5, POSTERIOR SPINAL FUSION T4-S1. Nausea/Vomiting: None    Postoperative hydration reviewed and adequate. Pain:  Pain Scale 1: Adult Nonverbal Pain Scale (11/15/17 1900)  Pain Intensity 1: 0 (11/15/17 1900)   Managed    Neurological Status:   Neuro (WDL): (P) Exceptions to WDL (11/15/17 1845)  Neuro  Neurologic State: (P) Eyes do not open to any stimulus; Anesthetized (11/15/17 1845)   At baseline    Mental Status and Level of Consciousness: Arousable    Pulmonary Status:   O2 Device: Endotracheal tube;T-tube (11/15/17 1900)   Adequate oxygenation and airway patent    Complications related to anesthesia: None    Post-anesthesia assessment completed.  No concerns    Signed By: He Tsai MD     November 15, 2017

## 2017-11-16 NOTE — PROGRESS NOTES
Physical Therapy  11/16/17    Order received. Patient chart reviewed. Per discussion with RN and OT, patient with orders to be flat in bed for an anticipated 2 days with dural tear. On hold for therapy at this time. Will evaluate when movement restrictions are removed. Saritha Michelle, PT, DPT

## 2017-11-16 NOTE — PERIOP NOTES
2025  updated. 2030 Spoke with Dr. Sophia Luke. Updated with patient status. VS stable. Awakens easily. Okay given to extubate. 2035 Patient extubated and placed on 02 at 2 LPM. Patient stable post extubation. 2110 VS stable. Awake. Resting comfortably. Patient denies nausea. Pain improved. No signs of excessive bleeding. Ready to transfer from PACU.

## 2017-11-16 NOTE — ROUTINE PROCESS
Bedside and Verbal shift change report given to Leslie PATE (oncoming nurse) by Maria G Atkins (offgoing nurse). Report included the following information SBAR, Kardex, OR Summary, Procedure Summary, Intake/Output, MAR, Accordion, Recent Results, Med Rec Status and Cardiac Rhythm NSR.

## 2017-11-16 NOTE — ROUTINE PROCESS
Primary Nurse Harman Robins and Shira Rebollar, RIO performed a dual skin assessment on this patient Impairment noted- see wound doc flow sheet  Earl score is 17

## 2017-11-16 NOTE — PROGRESS NOTES
Ortho Spine Daily Progress Note    Date of Surgery:  11/15/2017      Patient: Jaimie    YOB: 1945  Age: 81 y.o. SUBJECTIVE:   1 Day Post-Op following ANTERIOR SPINAL FUSION L3-S1, LUMBAR LAMINECTOMY L4-5, POSTERIOR SPINAL FUSION T4-S1    The patient is mildly confused this AM and has difficulty responding to commands. The patient's post operative pain appears to be adequately controlled with PCA. No c/o headache this AM.The HOB remains flat per protocol for dural leak. The patient denies CP, SOB, N/V/D, dizziness, abdominal pain. OBJECTIVE:     Vital Signs:    Temp (24hrs), Av.6 °F (36.4 °C), Min:97.1 °F (36.2 °C), Max:98.6 °F (37 °C)    Patient Vitals for the past 8 hrs:   BP Temp Pulse Resp SpO2 Weight   17 0800 - - - - 98 % -   17 0700 115/44 98.6 °F (37 °C) 64 10 100 % -   17 0300 114/43 97.6 °F (36.4 °C) 67 15 100 % 85.5 kg (188 lb 7.9 oz)           Physical Exam:  General: A&Ox3, NAD. Respiratory: Respirations are unlabored. Abdomen: dasia incisional tenderness. Surgical site: C,D and I.  Musculoskeletal: Calves are S/NT, Paramjit dorsi/plantar flexion/EHL/Quads intact. Neurological:  Paramjit LE's NVI    Laboratory Values:             Recent Labs      17   0237   HGB  9.6*   CREA  0.63   GLU  142*     Lab Results   Component Value Date/Time    Sodium 141 2017 02:37 AM    Potassium 3.9 2017 02:37 AM    Chloride 109 2017 02:37 AM    CO2 26 2017 02:37 AM    Glucose 142 2017 02:37 AM    BUN 12 2017 02:37 AM    Creatinine 0.63 2017 02:37 AM    Calcium 7.3 2017 02:37 AM     Hemovac = 510 ml in last 12 hours. PLAN:     S/P ANTERIOR SPINAL FUSION L3-S1, LUMBAR LAMINECTOMY L4-5, POSTERIOR SPINAL FUSION T4-S1 -  Stable. Intra op dural leak. The head of the bed is to remain flat for 48 hours after surgery due to intra operative dural leak. Hemodynamics Tolerating post op anemia. Will continue to monitor. Wound Continue dressing changes PRN. Post Operative Pain Pain Control: Adequate, continue current regimen. DVT Prophylaxis Continue with SCD'S, Encourage Ankle Pump Exercises, Mobilize. Discharge Disposition The patient is to remain in bed with HOB flat for 48 hours. Will focus on pain control and monitoring neurovascular status and vitals. Will continue to follow progress closely. Patient seen and evaluated by Dr Elvin Moore who agrees with the findings and treatment plan as outlined above.         Signed By: Sherryle Jamaica, PA-C  November 16, 2017 10:23 AM

## 2017-11-16 NOTE — PERIOP NOTES
TRANSFER - OUT REPORT:    Verbal report given to Lexa Ibrahim RN(name) on Deepak Smith  being transferred to [facility](unit) for routine post - op       Report consisted of patients Situation, Background, Assessment and   Recommendations(SBAR). Time Pre op antibiotic given:1700  Anesthesia Stop time: 2715    Information from the following report(s) SBAR, OR Summary, Intake/Output, MAR, Recent Results and Cardiac Rhythm NSR. was reviewed with the receiving nurse. Opportunity for questions and clarification was provided. Is the patient on 02? YES       L/Min 2    Is the patient on a monitor? YES    Is the nurse transporting with the patient? YES    Surgical Waiting Area notified of patient's transfer from PACU? YES      The following personal items collected during your admission accompanied patient upon transfer:   Dental Appliance: Dental Appliances: None  Vision: Visual Aid: Glasses  Hearing Aid:    Jewelry: Jewelry: None  Clothing: Clothing: With patient  Other Valuables:  Other Valuables: Clement Buys, With patient  Valuables sent to safe:

## 2017-11-17 LAB
BACTERIA SPEC CULT: NORMAL
BACTERIA SPEC CULT: NORMAL
GLUCOSE BLD STRIP.AUTO-MCNC: 120 MG/DL (ref 65–100)
GLUCOSE BLD STRIP.AUTO-MCNC: 123 MG/DL (ref 65–100)
GLUCOSE BLD STRIP.AUTO-MCNC: 133 MG/DL (ref 65–100)
HGB BLD-MCNC: 8.7 G/DL (ref 11.5–16)
SERVICE CMNT-IMP: ABNORMAL
SERVICE CMNT-IMP: NORMAL

## 2017-11-17 PROCEDURE — 74011250637 HC RX REV CODE- 250/637: Performed by: PHYSICIAN ASSISTANT

## 2017-11-17 PROCEDURE — 74011250637 HC RX REV CODE- 250/637: Performed by: NURSE PRACTITIONER

## 2017-11-17 PROCEDURE — 82962 GLUCOSE BLOOD TEST: CPT

## 2017-11-17 PROCEDURE — 36415 COLL VENOUS BLD VENIPUNCTURE: CPT | Performed by: PHYSICIAN ASSISTANT

## 2017-11-17 PROCEDURE — 74011250636 HC RX REV CODE- 250/636: Performed by: NURSE PRACTITIONER

## 2017-11-17 PROCEDURE — 65270000029 HC RM PRIVATE

## 2017-11-17 PROCEDURE — 85018 HEMOGLOBIN: CPT | Performed by: PHYSICIAN ASSISTANT

## 2017-11-17 RX ORDER — TRAMADOL HYDROCHLORIDE 50 MG/1
50 TABLET ORAL
Status: DISCONTINUED | OUTPATIENT
Start: 2017-11-17 | End: 2017-11-22 | Stop reason: HOSPADM

## 2017-11-17 RX ORDER — DEXTROSE 50 % IN WATER (D50W) INTRAVENOUS SYRINGE
12.5-25 AS NEEDED
Status: DISCONTINUED | OUTPATIENT
Start: 2017-11-17 | End: 2017-11-22 | Stop reason: HOSPADM

## 2017-11-17 RX ORDER — SODIUM CHLORIDE 0.9 % (FLUSH) 0.9 %
20 SYRINGE (ML) INJECTION AS NEEDED
Status: DISCONTINUED | OUTPATIENT
Start: 2017-11-17 | End: 2017-11-22 | Stop reason: HOSPADM

## 2017-11-17 RX ORDER — SODIUM CHLORIDE 9 MG/ML
50 INJECTION, SOLUTION INTRAVENOUS CONTINUOUS
Status: DISCONTINUED | OUTPATIENT
Start: 2017-11-17 | End: 2017-11-22 | Stop reason: HOSPADM

## 2017-11-17 RX ORDER — ACETAMINOPHEN 500 MG
1000 TABLET ORAL EVERY 6 HOURS
Status: DISCONTINUED | OUTPATIENT
Start: 2017-11-17 | End: 2017-11-22 | Stop reason: HOSPADM

## 2017-11-17 RX ORDER — SODIUM CHLORIDE 0.9 % (FLUSH) 0.9 %
10 SYRINGE (ML) INJECTION EVERY 8 HOURS
Status: DISCONTINUED | OUTPATIENT
Start: 2017-11-17 | End: 2017-11-17

## 2017-11-17 RX ORDER — ACETAMINOPHEN 10 MG/ML
1000 INJECTION, SOLUTION INTRAVENOUS EVERY 6 HOURS
Status: DISCONTINUED | OUTPATIENT
Start: 2017-11-17 | End: 2017-11-17 | Stop reason: SDUPTHER

## 2017-11-17 RX ORDER — MAGNESIUM SULFATE 100 %
4 CRYSTALS MISCELLANEOUS AS NEEDED
Status: DISCONTINUED | OUTPATIENT
Start: 2017-11-17 | End: 2017-11-22 | Stop reason: HOSPADM

## 2017-11-17 RX ORDER — INSULIN LISPRO 100 [IU]/ML
INJECTION, SOLUTION INTRAVENOUS; SUBCUTANEOUS
Status: DISCONTINUED | OUTPATIENT
Start: 2017-11-17 | End: 2017-11-22 | Stop reason: HOSPADM

## 2017-11-17 RX ORDER — OXYCODONE HYDROCHLORIDE 5 MG/1
5 TABLET ORAL
Status: DISCONTINUED | OUTPATIENT
Start: 2017-11-17 | End: 2017-11-22 | Stop reason: SDUPTHER

## 2017-11-17 RX ORDER — SODIUM CHLORIDE 0.9 % (FLUSH) 0.9 %
10 SYRINGE (ML) INJECTION EVERY 24 HOURS
Status: DISCONTINUED | OUTPATIENT
Start: 2017-11-17 | End: 2017-11-17

## 2017-11-17 RX ORDER — POLYETHYLENE GLYCOL 3350 17 G/17G
17 POWDER, FOR SOLUTION ORAL 2 TIMES DAILY
Status: DISCONTINUED | OUTPATIENT
Start: 2017-11-17 | End: 2017-11-22 | Stop reason: HOSPADM

## 2017-11-17 RX ORDER — SODIUM CHLORIDE 0.9 % (FLUSH) 0.9 %
10 SYRINGE (ML) INJECTION AS NEEDED
Status: DISCONTINUED | OUTPATIENT
Start: 2017-11-17 | End: 2017-11-22 | Stop reason: HOSPADM

## 2017-11-17 RX ADMIN — MEDROXYPROGESTERONE ACETATE 2.5 MG: 2.5 TABLET ORAL at 09:15

## 2017-11-17 RX ADMIN — BISACODYL 10 MG: 10 SUPPOSITORY RECTAL at 22:07

## 2017-11-17 RX ADMIN — ACETAMINOPHEN 1000 MG: 500 TABLET, FILM COATED ORAL at 10:25

## 2017-11-17 RX ADMIN — ACETAMINOPHEN 650 MG: 325 TABLET ORAL at 04:39

## 2017-11-17 RX ADMIN — Medication 10 ML: at 07:19

## 2017-11-17 RX ADMIN — DOCUSATE SODIUM AND SENNOSIDES 1 TABLET: 8.6; 5 TABLET, FILM COATED ORAL at 17:28

## 2017-11-17 RX ADMIN — ACETAMINOPHEN 1000 MG: 500 TABLET, FILM COATED ORAL at 17:28

## 2017-11-17 RX ADMIN — Medication 20 ML: at 22:13

## 2017-11-17 RX ADMIN — DOCUSATE SODIUM AND SENNOSIDES 1 TABLET: 8.6; 5 TABLET, FILM COATED ORAL at 09:15

## 2017-11-17 RX ADMIN — LEVOTHYROXINE SODIUM 88 MCG: 88 TABLET ORAL at 07:19

## 2017-11-17 RX ADMIN — Medication 10 ML: at 10:28

## 2017-11-17 RX ADMIN — ESTERIFIED ESTROGENS AND METHYLTESTOSTERONE 1 TABLET: .625; 1.25 TABLET ORAL at 09:15

## 2017-11-17 RX ADMIN — Medication: at 08:12

## 2017-11-17 RX ADMIN — TRAMADOL HYDROCHLORIDE 50 MG: 50 TABLET, FILM COATED ORAL at 14:14

## 2017-11-17 RX ADMIN — CYCLOBENZAPRINE HYDROCHLORIDE 5 MG: 10 TABLET, FILM COATED ORAL at 11:14

## 2017-11-17 RX ADMIN — SODIUM CHLORIDE 50 ML/HR: 900 INJECTION, SOLUTION INTRAVENOUS at 14:16

## 2017-11-17 RX ADMIN — POLYETHYLENE GLYCOL 3350 17 G: 17 POWDER, FOR SOLUTION ORAL at 09:15

## 2017-11-17 RX ADMIN — ACETAMINOPHEN 1000 MG: 500 TABLET, FILM COATED ORAL at 22:04

## 2017-11-17 NOTE — PROGRESS NOTES
ORTHOPAEDIC LUMBAR FUSION PROGRESS NOTE    NAME: Amelia Cano   :  1945   MRN:  [id number]   DATE:  2017    POD: 2 Days Post-Op  S/P: Procedure(s):  ANTERIOR SPINAL FUSION L3-S1, LUMBAR LAMINECTOMY L4-5, POSTERIOR SPINAL FUSION T4-S1    SUBJECTIVE:    Patient found lying supine in bed, remaining flat as ordered. Patient is awake and oriented, but slow with responses -has been using the PCA device for pain control but discussed with her about changing to PO pain medication today. She states is feeling better today than yesterday, back and abdominal incisions hurt but denies chest pain, n/v, sob or ha. No other acute complaints. Recent Labs      17   0540  17   0237   HGB  8.7*  9.6*   NA   --   141   K   --   3.9   CL   --   109*   CO2   --   26   BUN   --   12   CREA   --   0.63   GLU   --   142*     Patient Vitals for the past 12 hrs:   BP Temp Pulse Resp SpO2 Weight   17 1115 126/50 98.2 °F (36.8 °C) 78 16 95 % -   17 1000 - 98.7 °F (37.1 °C) - - - -   17 0700 129/61 98.4 °F (36.9 °C) 93 21 94 % -   17 0300 - - - - - 87.6 kg (193 lb 2 oz)   17 0239 125/43 98.8 °F (37.1 °C) 80 16 94 % -   17 0000 - - - - 94 % -     Drain: removed    EXAM:  Positive strength/ROM bilat lower ext. Neuro intact  Dressings intact       PLAN:  Patient to remain lying flat today secondary to incidental dural tear during surgery -pt remains asymptomatic   -will allow patient to begin sitting up Saturday, monitoring for frontal headache. PT/OT -will initiate Saturday if patient progresses and is allow to sit up on Saturday  Expected post-surgical anemia -will continue to monitor. If Hgb falls to 8.0 or below, will transfuse 2 units PRBC  Change to PO pain medication today for pain management  Transfer to  today  D/c planning to inpatient rehab, possible Monday or Tuesday depending on progression.       Rolf Duenas PA-C

## 2017-11-17 NOTE — PROGRESS NOTES
Chart reviewed. Pt admitted on 11/15 for ANTERIOR POSTERIOR SPINAL FUSION. Met with pt at the bedside this PM to introduce role of case management, offer support, and identify disposition needs. Pt currently remaining flat on bedrest as ordered. PT/OT to evaluate pt once orders are lifted. At baseline, pt lives in a two level home with approximately 15 steps. Prior to admission, pt states she was independent with ADLs and IADLs without the use of DME. Pt's spouse is primarily independent but wears home O2. Pt is a retired . Pt identifies limited support- states her children will not be of assistance after discharge. With limited support system, anticipating pt may need inpatient rehab post discharge. At time of assessment, pt stated she prefers [facility] due to location (closer to her residence). Will await PT/OT evaluation and documentation to further define pt's care needs. Will need to obtain PT/OT notes in order to send referral to inpatient rehab. Anticipating discharge early next week pending progress and discharge plan. Pt may need BLS transport if discharged to rehab for safety. Will leave message for weekend CM to send referral to [facility] inpatient rehab once PT/OT has evaluated patient. CM provided opportunity for questions/concerns. Pt had just received pain medication and fell asleep at end of assessment. Will continue to follow. Care Management Interventions  PCP Verified by CM:  Yes (Dr. Hany Albarran)  Mode of Transport at Discharge: BLS  Transition of Care Consult (CM Consult): Discharge Planning  MyChart Signup: No  Discharge Durable Medical Equipment: No  Health Maintenance Reviewed: Yes  Physical Therapy Consult: Yes  Occupational Therapy Consult: No  Speech Therapy Consult: No  Current Support Network: Lives with Spouse, Own Home  Confirm Follow Up Transport: Family  Plan discussed with Pt/Family/Caregiver: Yes  Freedom of Choice Offered: Yes  Discharge Location  Discharge Placement: Rehab hospital/unit acute (prefers [facility] inpatient [facility])    URI Ko

## 2017-11-17 NOTE — PROGRESS NOTES
Physical Therapy note  11/17/17    Chart reviewed and discussed with RN. Pt remains on flat bedrest today (48hrs from post op) for large dural tear. Will hold mobilization today and plan to follow up tomorrow as appropriate.     Thank you,  Raz Nieves, PT, DPT

## 2017-11-17 NOTE — PROGRESS NOTES
Bedside and Verbal shift change report given to Gem Peraza (oncoming nurse) by Magali Johnson (offgoing nurse). Report included the following information SBAR, Kardex and Recent Results.

## 2017-11-17 NOTE — PROGRESS NOTES
Occupational Therapy Note 8598    Chart reviewed and discussed with RN. Pt remains on flat bedrest today (48hrs from post op) for large dural tear. Will hold mobilization today and plan to follow up tomorrow as appropriate. Thank you.     JANEL Roman/KOLTON

## 2017-11-17 NOTE — PROGRESS NOTES
Primary Nurse Kevin Vazquez, RIO and Antonia Bear RN performed a dual skin assessment on this patient Impairment noted- (Abdominal and posterior spine incisions w/cd&i dressings, as well as small tape burn/skin tear to R cheek  see wound doc flow sheet  Earl score is 17        New patient review note      TRANSFER - IN REPORT:    Verbal report received from Loree(name) on Thad Butts    being received from 6s NSTU (unit)   Going to Room#: to  from 678/01   for routine progression of care      Report consisted of patients Situation, Background, Assessment and   Recommendations(SBAR). Information from the following report(s) SBAR, Kardex, OR Summary, Intake/Output, MAR and Recent Results was reviewed with the receiving nurse. Opportunity for questions and clarification was provided. Assessment completed upon patients arrival to unit and care assumed. SITUATION:------------------------------------------------------------------------------------  Admitted:  11/15/2017         Attending Provider:  Nikkie Vela MD        Admitting Dx:  ADULT SCOLIOSIS, SPINAL STENOSIS L4-5  Kyphoscoliosis    Active Problems:    Kyphoscoliosis (11/15/2017)        2 Days Post-Op of Procedure(s):  ANTERIOR SPINAL FUSION L3-S1, LUMBAR LAMINECTOMY L4-5, POSTERIOR SPINAL FUSION T4-S1   BY: Nikkie Vela MD             ON: 11/15/2017    Baseline A & O x 4, clearing since PCA discontinued; A & O x 3 now  Tylenol po and flexeril given  Cruz in place  HOB Flat d/t duraleak; Drain discontinued yesterday  Clear Liquids w/no concentrated sweets  Abdominal incision CD&I  Back dressing saturated; will try to change prior to transfer  Hx of AFib & ablation earlier this year; Diabetic  Quad Lumen C-Line      Consultations:  None      PCP:  Brenna Hope MD   [phone]    Code Status: Full Code             Advance Directive? Verified     Isolation:  There are currently no Active Isolations       MDRO: No current active infections    BACKGROUND:-------------------------------------------------------------------------------------------  Allergies: Allergies   Allergen Reactions    Bactrim [Sulfamethoprim] Swelling     \"LIP SWELLING\"       Past Medical History:   Diagnosis Date    Anxiety     Arrhythmia     A-FIB    Arthritis     BACK AND \"EVERYWHERE\"    Diabetes ([facility] [address]     TYPE II    GERD (gastroesophageal reflux disease)     Nausea & vomiting     Thyroid disease     HYPO       Past Surgical History:   Procedure Laterality Date    CARDIAC SURG PROCEDURE UNLIST  06/06/2017    A-FIB ABLATION    CARDIAC SURG PROCEDURE UNLIST  2014, 2016    CARDIOVERSION 3X FOR A-FIB    HX DILATION AND CURETTAGE  10/2009    HX GI  02/20/2017    ENDOSCOPY    HX GI      COLONOSCOPY    HX GYN  1980'S    LAP FOR ENDOMETRIOSIS    HX KNEE REPLACEMENT Right 11/08/2008    HX ORTHOPAEDIC Bilateral 2013, 2014    TRIGGER FINGER (RING FINGER)    HX ORTHOPAEDIC Bilateral 2005, 2015    BONE REMOVAL AND TENDON REPOSITIONING    HX ORTHOPAEDIC Right 08/05/2001    HAMMERTOE (4TH AND 5TH TOE)    HX ORTHOPAEDIC Right 03/2016    WRIST TENDON SNIPPING    HX TONSILLECTOMY  1953       Prescriptions Prior to Admission   Medication Sig    medroxyPROGESTERone (PROVERA) 2.5 mg tablet Take 2.5 mg by mouth daily.  estrogens, conjugated,-methylTESTOSTERone (COVARYX H.S.) 0.625-1.25 mg per tablet Take 1 Tab by mouth daily. Indications: VASOMOTOR SYMPTOMS ASSOCIATED WITH MENOPAUSE    levothyroxine (SYNTHROID) 88 mcg tablet Take 88 mcg by mouth Daily (before breakfast).  sodium chloride (AYR SALINE) 0.65 % nasal spray 1 Spray as needed for Congestion.  ketotifen (EYE ITCH RELIEF) 0.025 % (0.035 %) ophthalmic solution Administer 1 Drop to both eyes as needed.  desoximetasone (TOPICORT) 0.25 % topical cream Apply  to affected area as needed for Skin Irritation.     naphazoline-pheniramine (NAPHCON-A) 0.025-0.3 % ophthalmic solution Administer  to both eyes as needed. Vaccinations: There is no immunization history on file for this patient. Readmission Risk:        ASSESSMENT:------------------------------------------------------------------------------------------  Age: 81 y.o.              Gender: female          Height: Height: 5' 6\" (167.6 cm)      Wt Readings from Last 3 Encounters:   11/17/17 87.6 kg (193 lb 2 oz)   10/27/17 83 kg (183 lb)   10/25/17 82.3 kg (181 lb 6 oz)          Vitals w/ MEWS Score (last day)     Date/Time MEWS Score Pulse Resp Temp BP Level of Consciousness SpO2    11/17/17 1115 1 78 16 98.2 °F (36.8 °C) 126/50 Alert 95 %    11/17/17 1000 -- -- -- 98.7 °F (37.1 °C) -- -- --    11/17/17 0700 2 93 21 98.4 °F (36.9 °C) 129/61 Alert 94 %    11/17/17 0239 1 80 16 98.8 °F (37.1 °C) 125/43 Alert 94 %    11/17/17 0000 -- -- -- -- -- -- 94 %    11/16/17 2300 0 73 14 98.5 °F (36.9 °C) 138/45 Alert 98 %    11/16/17 2000 -- -- -- -- -- Alert --    11/16/17 1900 2 77 24 98.8 °F (37.1 °C) 128/58 Alert 96 %    11/16/17 1500 2 89 22 98.5 °F (36.9 °C) 116/46 Alert 100 %    11/16/17 1100 0 (!)  59 10 98.8 °F (37.1 °C) 112/52 Alert 100 %    11/16/17 0800 -- -- -- -- -- -- 98 %    11/16/17 0700 0 64 10 98.6 °F (37 °C) 115/44 Alert 100 %    11/16/17 0300 1 67 15 97.6 °F (36.4 °C) 114/43 Alert 100 %               Recent Results (from the past 24 hour(s))   GLUCOSE, POC    Collection Time: 11/16/17  5:20 PM   Result Value Ref Range    Glucose (POC) 94 65 - 100 mg/dL    Performed by JEFF SY(CON)    GLUCOSE, POC    Collection Time: 11/16/17  9:56 PM   Result Value Ref Range    Glucose (POC) 90 65 - 100 mg/dL    Performed by JEFF SY(CON)    HEMOGLOBIN    Collection Time: 11/17/17  5:40 AM   Result Value Ref Range    HGB 8.7 (L) 11.5 - 16.0 g/dL   GLUCOSE, POC    Collection Time: 11/17/17  7:17 AM   Result Value Ref Range    Glucose (POC) 120 (H) 65 - 100 mg/dL    Performed by Ivett Marina        Active Orders Diet    DIET CLEAR LIQUID No Conc. Sweets       Orientation: Orientation Level: Oriented X4 (periods of confusion)    Active Lines/Drains:   Lines:   Quad Lumen 11/15/17 Right Internal jugular (Active)   Central Line Being Utilized Yes 11/17/2017  4:00 AM   Criteria for Appropriate Use Hemodynamically unstable, requiring monitoring lines, vasopressors, or volume resuscitation 11/17/2017  4:00 AM   Site Assessment Clean, dry, & intact 11/17/2017  4:00 AM   Infiltration Assessment 0 11/17/2017  4:00 AM   Affected Extremity/Extremities Color distal to insertion site pink (or appropriate for race) 11/17/2017  4:00 AM   Date of Last Dressing Change 11/15/17 11/17/2017  4:00 AM   Dressing Status Clean, dry, & intact 11/17/2017  4:00 AM   Dressing Type Disk with Chlorhexadine gluconate (CHG) 11/17/2017  4:00 AM   Action Taken Open ports on tubing capped 11/17/2017  4:00 AM   Proximal Hub Color/Line Status Blue; Infusing 11/17/2017  4:00 AM   Positive Blood Return (Medial Site) Yes 11/17/2017  4:00 AM   Medial 1 Hub Color/Line Status Brown;Capped 11/17/2017  4:00 AM   Positive Blood Return (Lateral Site) Yes 11/17/2017  4:00 AM   Medial 2 Hub Color/Line Status Gray;Capped 11/17/2017  4:00 AM   Positive Blood Return (Site #3) Yes 11/17/2017  4:00 AM   Distal Hub Color/Line Status White;Capped 11/17/2017  4:00 AM   Positive Blood Return (Site #4) Yes 11/17/2017  4:00 AM   Alcohol Cap Used Yes 11/17/2017  4:00 AM       Peripheral IV 11/15/17 Left Hand (Active)   Site Assessment Clean, dry, & intact 11/17/2017 12:00 AM   Phlebitis Assessment 0 11/17/2017 12:00 AM   Infiltration Assessment 0 11/17/2017 12:00 AM   Dressing Status Clean, dry, & intact 11/17/2017 12:00 AM   Dressing Type Transparent;Tape 11/17/2017 12:00 AM   Hub Color/Line Status Green;Capped 11/17/2017 12:00 AM   Action Taken Open ports on tubing capped 11/17/2017 12:00 AM   Alcohol Cap Used Yes 11/17/2017 12:00 AM          Date 11/16/17 0700 - 11/17/17 5157 11/17/17 0700 - 11/18/17 0659   Shift 0700-1859 5256-0480 24 Hour Total 0700-1859 9151-7357 24 Hour Total   I  N  T  A  K  E   P. O.    600  600      P. O.    600  600    Shift Total  (mL/kg)    600  (6.8)  600  (6.8)   O  U  T  P  U  T   Urine  (mL/kg/hr) 625  (0.6) 1525  (1.5) 2150  (1) 300  300      Urine Voided  1225 1225         Urine Output (mL) (Urinary Catheter 11/15/17 2- way) 625 300 925 300  300    Drains 380  380         Output (ml) ([REMOVED] Hemovac Right Back) 380  380       Shift Total  (mL/kg) 1005  (11.8) 1525  (17.4) 2530  (28.9) 300  (3.4)  300  (3.4)   NET -1005 -1525 -2530 300  300   Weight (kg) 85.5 87.6 87.6 87.6 87.6 87.6       Urine Asess:  Urinary Status: Cruz      Last BM:       Skin Integrity: Incision (comment) (abdominal; back)   Wound Abdomen Mid-DRESSING STATUS: Clean, dry, and intact  Wound Back-DRESSING STATUS: Clean, dry, and intact    Wound Abdomen Mid-DRESSING TYPE: Dry dressing  Wound Back-DRESSING TYPE: 4 x 4, Topical skin adhesive/glue (prineo dermabond)    Mobility: Very limited   Weight Bearing Status: WBAT (Weight Bearing as Tolerated)            RECOMMENDATION:--------------------------------------------------------------

## 2017-11-17 NOTE — PROGRESS NOTES
Problem: Falls - Risk of  Goal: *Absence of Falls  Document Cesar Fall Risk and appropriate interventions in the flowsheet.    Outcome: Progressing Towards Goal  Fall Risk Interventions:  Mobility Interventions: Bed/chair exit alarm, Communicate number of staff needed for ambulation/transfer, OT consult for ADLs, PT Consult for mobility concerns         Medication Interventions: Bed/chair exit alarm, Teach patient to arise slowly, Patient to call before getting OOB, Evaluate medications/consider consulting pharmacy    Elimination Interventions: Call light in reach, Bed/chair exit alarm, Patient to call for help with toileting needs

## 2017-11-17 NOTE — PROGRESS NOTES
Clinical Pharmacy Note: IV to PO Acetaminophen  Rich Comer order for IV acetaminophen has been changed to PO based on the P&T and [facility] approved automatic conversion policy for eligible patients. Please call with questions. - Discussed with RN, patient has tolerated all oral medications this morning. Therefore, will switch to oral acetaminophen.

## 2017-11-17 NOTE — PROGRESS NOTES
Bedside shift change report given to Navid Perez (oncoming nurse) by Erik Grijalva (offgoing nurse). Report included the following information SBAR, Kardex, Intake/Output, MAR, Accordion, Recent Results and Med Rec Status.

## 2017-11-17 NOTE — PROGRESS NOTES
At shift change, patient is not able to answer orientation questions appropriately, reports being very cold and experiencing severe pain despite use of PCA pump. Hemoglobin results this AM continue downward trend. RN called Elisa Trevino NP. She will round on patient, hold PCA, she will add IV Tylenol to pain control regimen.

## 2017-11-17 NOTE — PROGRESS NOTES
PCA pump cleared at end of shift.   End of shift 12 hour summary:    1mg Total Dose  79 Demand  5 delivered

## 2017-11-17 NOTE — PROGRESS NOTES
Drainage present on posterior dressing outside bounds of previously marked drainage. RN called Yoshi Sanches NP. Message left on phone mail. [id number] NP present on unit. Will evaluate patient.

## 2017-11-18 LAB
GLUCOSE BLD STRIP.AUTO-MCNC: 103 MG/DL (ref 65–100)
GLUCOSE BLD STRIP.AUTO-MCNC: 110 MG/DL (ref 65–100)
GLUCOSE BLD STRIP.AUTO-MCNC: 117 MG/DL (ref 65–100)
GLUCOSE BLD STRIP.AUTO-MCNC: 98 MG/DL (ref 65–100)
HCT VFR BLD AUTO: 24.6 % (ref 35–47)
HGB BLD-MCNC: 8.3 G/DL (ref 11.5–16)
SERVICE CMNT-IMP: ABNORMAL
SERVICE CMNT-IMP: NORMAL

## 2017-11-18 PROCEDURE — 74011250636 HC RX REV CODE- 250/636: Performed by: PHYSICIAN ASSISTANT

## 2017-11-18 PROCEDURE — 97161 PT EVAL LOW COMPLEX 20 MIN: CPT | Performed by: PHYSICAL THERAPIST

## 2017-11-18 PROCEDURE — 97162 PT EVAL MOD COMPLEX 30 MIN: CPT | Performed by: PHYSICAL THERAPIST

## 2017-11-18 PROCEDURE — 97530 THERAPEUTIC ACTIVITIES: CPT | Performed by: PHYSICAL THERAPIST

## 2017-11-18 PROCEDURE — 74011250637 HC RX REV CODE- 250/637: Performed by: NURSE PRACTITIONER

## 2017-11-18 PROCEDURE — 97110 THERAPEUTIC EXERCISES: CPT | Performed by: PHYSICAL THERAPIST

## 2017-11-18 PROCEDURE — 82962 GLUCOSE BLOOD TEST: CPT

## 2017-11-18 PROCEDURE — 85018 HEMOGLOBIN: CPT | Performed by: PHYSICIAN ASSISTANT

## 2017-11-18 PROCEDURE — 77010033678 HC OXYGEN DAILY

## 2017-11-18 PROCEDURE — 65270000029 HC RM PRIVATE

## 2017-11-18 PROCEDURE — 36415 COLL VENOUS BLD VENIPUNCTURE: CPT | Performed by: PHYSICIAN ASSISTANT

## 2017-11-18 PROCEDURE — 74011250637 HC RX REV CODE- 250/637: Performed by: PHYSICIAN ASSISTANT

## 2017-11-18 PROCEDURE — 74011250636 HC RX REV CODE- 250/636: Performed by: NURSE PRACTITIONER

## 2017-11-18 RX ADMIN — ACETAMINOPHEN 1000 MG: 500 TABLET, FILM COATED ORAL at 12:35

## 2017-11-18 RX ADMIN — DOCUSATE SODIUM AND SENNOSIDES 1 TABLET: 8.6; 5 TABLET, FILM COATED ORAL at 09:25

## 2017-11-18 RX ADMIN — ESTERIFIED ESTROGENS AND METHYLTESTOSTERONE 1 TABLET: .625; 1.25 TABLET ORAL at 09:24

## 2017-11-18 RX ADMIN — SODIUM CHLORIDE 50 ML/HR: 900 INJECTION, SOLUTION INTRAVENOUS at 12:33

## 2017-11-18 RX ADMIN — ACETAMINOPHEN 1000 MG: 500 TABLET, FILM COATED ORAL at 19:27

## 2017-11-18 RX ADMIN — TRAMADOL HYDROCHLORIDE 50 MG: 50 TABLET, FILM COATED ORAL at 00:15

## 2017-11-18 RX ADMIN — LEVOTHYROXINE SODIUM 88 MCG: 88 TABLET ORAL at 07:43

## 2017-11-18 RX ADMIN — MEDROXYPROGESTERONE ACETATE 2.5 MG: 2.5 TABLET ORAL at 10:07

## 2017-11-18 RX ADMIN — DOCUSATE SODIUM AND SENNOSIDES 1 TABLET: 8.6; 5 TABLET, FILM COATED ORAL at 19:30

## 2017-11-18 RX ADMIN — ONDANSETRON 4 MG: 2 INJECTION INTRAMUSCULAR; INTRAVENOUS at 10:11

## 2017-11-18 RX ADMIN — TRAMADOL HYDROCHLORIDE 50 MG: 50 TABLET, FILM COATED ORAL at 09:24

## 2017-11-18 NOTE — PROGRESS NOTES
Bedside shift change report given to [address] Huber (oncoming nurse) by Elana Arredondo RN (offgoing nurse). Report included the following information SBAR, Kardex, Intake/Output, MAR and Recent Results.

## 2017-11-18 NOTE — PROGRESS NOTES
Bedside and Verbal shift change report given to [address] [place] [address] (oncoming nurse) by Justin Alicea RN (offgoing nurse). Report included the following information SBAR, Kardex, OR Summary, Intake/Output, MAR and Recent Results.

## 2017-11-18 NOTE — PROGRESS NOTES
change     Patient will benefit from skilled intervention to address the above impairments. Patients rehabilitation potential is considered to be Good  Factors which may influence rehabilitation potential include:   [x]         None noted  []         Mental ability/status  []         Medical condition  []         Home/family situation and support systems  []         Safety awareness  []         Pain tolerance/management  []         Other:      PLAN :  Recommendations and Planned Interventions:  [x]           Bed Mobility Training             [x]    Neuromuscular Re-Education  [x]           Transfer Training                   []    Orthotic/Prosthetic Training  [x]           Gait Training                         []    Modalities  [x]           Therapeutic Exercises           []    Edema Management/Control  [x]           Therapeutic Activities            [x]    Patient and Family Training/Education  []           Other (comment):    Frequency/Duration: Patient will be followed by physical therapy  twice daily to address goals. Discharge Recommendations: To Be Determined  Further Equipment Recommendations for Discharge: tbd     SUBJECTIVE:   Patient stated I'm so tired. ....     OBJECTIVE DATA SUMMARY:   HISTORY:    Past Medical History:   Diagnosis Date    Anxiety     Arrhythmia     A-FIB    Arthritis     BACK AND \"EVERYWHERE\"    Diabetes ([facility] [address]     TYPE II    GERD (gastroesophageal reflux disease)     Nausea & vomiting     Thyroid disease     HYPO     Past Surgical History:   Procedure Laterality Date    CARDIAC SURG PROCEDURE UNLIST  06/06/2017    A-FIB ABLATION    CARDIAC SURG PROCEDURE UNLIST  2014, 2016    CARDIOVERSION 3X FOR A-FIB    HX DILATION AND CURETTAGE  10/2009    HX GI  02/20/2017    ENDOSCOPY    HX GI      COLONOSCOPY    HX GYN  1980'S    LAP FOR ENDOMETRIOSIS    HX KNEE REPLACEMENT Right 11/08/2008    HX ORTHOPAEDIC Bilateral 2013, 2014    TRIGGER FINGER (RING FINGER)    HX ORTHOPAEDIC Bilateral 2005, 2015    BONE REMOVAL AND TENDON REPOSITIONING    HX ORTHOPAEDIC Right 08/05/2001    HAMMERTOE (4TH AND 5TH TOE)    HX ORTHOPAEDIC Right 03/2016    WRIST TENDON SNIPPING    HX TONSILLECTOMY  1953     Prior Level of Function/Home Situation: as above  Personal factors and/or comorbidities impacting plan of care: none    Home Situation  Home Environment: Private residence  # Steps to Enter: 5  One/Two Story Residence: Two story  # of Interior Steps: 15  Interior Rails: Right  Lift Chair Available: No  Living Alone: No  Support Systems: Spouse/Significant Other/Partner  Patient Expects to be Discharged to[de-identified] Private residence  Current DME Used/Available at Home: Glucometer, Blood pressure cuff    EXAMINATION/PRESENTATION/DECISION MAKING:   Critical Behavior:  Neurologic State: lethargic  Orientation Level: Oriented to person, Oriented to place, Oriented to situation, Disoriented to time  Cognition: Follows commands, Decreased attention/concentration     Hearing: Auditory  Auditory Impairment: Hard of hearing, bilateral  Hearing Aids/Status: Bilateral  Skin:  Dressing in place; leakage; nursing aware. Edema: no sig noted  Range Of Motion:  AROM: Within functional limits                       Strength:    Strength: Generally decreased, functional                    Tone & Sensation:   Tone: Normal              Sensation: Intact               Coordination:  Coordination: Within functional limits (slowed)  Vision:      Functional Mobility:  Bed Mobility:  Rolling: Maximum assistance; Additional time  Supine to Sit: Maximum assistance; Additional time  Sit to Supine: Maximum assistance  Scooting: Maximum assistance  Transfers:  Sit to Stand: Maximum assistance  Stand to Sit: Maximum assistance  Stand Pivot Transfers: (Other) comment                    Balance:   Sitting: Impaired  Sitting - Static: Poor (constant support)  Sitting - Dynamic: Poor (constant support)  Standing: Impaired  Standing - Static: Constant support;Poor  Standing - Dynamic : Poor  Ambulation/Gait Training:  Distance (ft): 2 Feet (ft) (laterally along bed)     Ambulation - Level of Assistance: Total assistance     Gait Description (WDL): Exceptions to WDL  Gait Abnormalities: Shuffling gait (knee buckling; post lean)        Base of Support: Widened     Speed/Maddi: Slow;Shuffled                   Therapeutic Exercises:   Glut sets, gentle abd iso, ankle pumping, heel sliding thru range tolerated - doubt recall given arousal level during rx    Functional Measure:    Elder Mobility Scale    3/20         EMS and G-code impairment scale:  Percentage of impairment CH  0% CI  1-19% CJ  20-39% CK  40-59% CL  60-79% CM  80-99% CN  100%   EMS Score 0-20 20 17-19 13-16 9-12 5-8 1-4 0      Scores under 10  generally these patients are dependent in mobility maneuvers; require help with  basic ADL, such as transfers, toileting and dressing. Scores between 10  13  generally these patients are borderline in terms of safe mobility and  independence in ADL i.e. they require some help with some mobility maneuvers. Scores over 14  Generally these patients are able to perform mobility maneuvers alone and safely  and are independent in basic ADL. G codes: In compliance with CMSs Claims Based Outcome Reporting, the following G-code set was chosen for this patient based on their primary functional limitation being treated: The outcome measure chosen to determine the severity of the functional limitation was the elderly mob scale with a score of 3/20 which was correlated with the impairment scale.     ? Mobility - Walking and Moving Around:     - CURRENT STATUS: CM - 80%-99% impaired, limited or restricted    - GOAL STATUS: CK - 40%-59% impaired, limited or restricted    - D/C STATUS:  ---------------To be determined---------------      Physical Therapy Evaluation Charge Determination   History Examination Presentation Decision-Making   LOW Complexity : Zero comorbidities / personal factors that will impact the outcome / POC MEDIUM Complexity : 3 Standardized tests and measures addressing body structure, function, activity limitation and / or participation in recreation  MEDIUM Complexity : Evolving with changing characteristics  Other outcome measures eldrly mob scale  MEDIUM      Based on the above components, the patient evaluation is determined to be of the following complexity level: MEDIUM    Pain:                    Activity Tolerance:   Drop of 40 mm hg with change from 60 degrees HOB elevated to sitting at EOB  Please refer to the flowsheet for vital signs taken during this treatment. After treatment:   []         Patient left in no apparent distress sitting up in chair  [x]         Patient left in no apparent distress in bed  [x]         Call bell left within reach  [x]         Nursing notified  []         Caregiver present  []         Bed alarm activated    COMMUNICATION/EDUCATION:   The patients plan of care was discussed with: Registered Nurse and Certified Nursing Assistant/Patient Care Technician. [x]         Fall prevention education was provided and the patient/caregiver indicated understanding. []         Patient/family have participated as able in goal setting and plan of care. [x]         Patient/family agree to work toward stated goals and plan of care. []         Patient understands intent and goals of therapy, but is neutral about his/her participation. []         Patient is unable to participate in goal setting and plan of care.     Thank you for this referral.  Mary Butts, PT   Time Calculation: 40 mins

## 2017-11-18 NOTE — PROGRESS NOTES
Bedside and Verbal shift change report given to Justin Alicea RN (oncoming nurse) by Roxie Isabel (offgoing nurse). Report included the following information SBAR, Kardex, OR Summary, Intake/Output, MAR and Recent Results.

## 2017-11-18 NOTE — PROGRESS NOTES
ORTHOPAEDIC LUMBAR FUSION PROGRESS NOTE    NAME: Gaby Alvarez   :  1945   MRN:  [id number]   DATE:  2017    POD: 3 Days Post-Op  S/P: Procedure(s):  ANTERIOR SPINAL FUSION L3-S1, LUMBAR LAMINECTOMY L4-5, POSTERIOR SPINAL FUSION T4-S1    SUBJECTIVE:    Patient found lying supine in bed, remaining flat. Been flat for 48 hrs. Patient is awake and oriented. She states is feeling better today than yesterday, back and abdominal incisions hurt but denies chest pain, n/v, sob or ha. No other acute complaints. Recent Labs      17   0423   17   0237   HGB  8.3*   < >  9.6*   HCT  24.6*   --    --    NA   --    --   141   K   --    --   3.9   CL   --    --   109*   CO2   --    --   26   BUN   --    --   12   CREA   --    --   0.63   GLU   --    --   142*    < > = values in this interval not displayed. Patient Vitals for the past 12 hrs:   BP Temp Pulse Resp SpO2   17 0848 144/69 98.5 °F (36.9 °C) 97 16 94 %   17 0411 152/72 98 °F (36.7 °C) 90 18 95 %     Drain: removed    EXAM:  Positive strength/ROM bilat lower ext. Neuro intact  Dressings intact       PLAN:  - Will allow patient to begin sitting up today, monitoring for frontal headache. PT/OT -will initiate today  Expected post-surgical anemia -will continue to monitor. If Hgb falls to 8.0 or below, will transfuse 2 units PRBC  D/c planning to inpatient rehab, possible Monday or Tuesday depending on progression.       Manolo Llanes MD

## 2017-11-18 NOTE — PROGRESS NOTES
Dr. Merlene King on call for Dr. Elvin Moore) notified and aware that patient is a diabetic and is on Glucose checks but not on any sliding scale. Patient usually takes Glucaphage 500 mg by mouth once a day but it's not started yet; however, patient is on clear liquids. RN requesting sliding scale protocol. Dr. Merlene King ordered,\"Place patient on Humalog normal sensitivity with the hypoglycemia protocol. \" RN will implement Dr. Tosin Jones orders.

## 2017-11-19 LAB
GLUCOSE BLD STRIP.AUTO-MCNC: 103 MG/DL (ref 65–100)
GLUCOSE BLD STRIP.AUTO-MCNC: 104 MG/DL (ref 65–100)
GLUCOSE BLD STRIP.AUTO-MCNC: 105 MG/DL (ref 65–100)
HCT VFR BLD AUTO: 25.2 % (ref 35–47)
HGB BLD-MCNC: 8.5 G/DL (ref 11.5–16)
SERVICE CMNT-IMP: ABNORMAL

## 2017-11-19 PROCEDURE — 85018 HEMOGLOBIN: CPT | Performed by: PHYSICIAN ASSISTANT

## 2017-11-19 PROCEDURE — 74011250637 HC RX REV CODE- 250/637: Performed by: PHYSICIAN ASSISTANT

## 2017-11-19 PROCEDURE — 36415 COLL VENOUS BLD VENIPUNCTURE: CPT | Performed by: PHYSICIAN ASSISTANT

## 2017-11-19 PROCEDURE — G8987 SELF CARE CURRENT STATUS: HCPCS

## 2017-11-19 PROCEDURE — 97165 OT EVAL LOW COMPLEX 30 MIN: CPT

## 2017-11-19 PROCEDURE — G8988 SELF CARE GOAL STATUS: HCPCS

## 2017-11-19 PROCEDURE — 74011250636 HC RX REV CODE- 250/636: Performed by: PHYSICIAN ASSISTANT

## 2017-11-19 PROCEDURE — 97535 SELF CARE MNGMENT TRAINING: CPT

## 2017-11-19 PROCEDURE — 65270000029 HC RM PRIVATE

## 2017-11-19 PROCEDURE — 97116 GAIT TRAINING THERAPY: CPT

## 2017-11-19 PROCEDURE — 97530 THERAPEUTIC ACTIVITIES: CPT

## 2017-11-19 PROCEDURE — 74011250637 HC RX REV CODE- 250/637: Performed by: NURSE PRACTITIONER

## 2017-11-19 PROCEDURE — 82962 GLUCOSE BLOOD TEST: CPT

## 2017-11-19 RX ADMIN — POLYETHYLENE GLYCOL 3350 17 G: 17 POWDER, FOR SOLUTION ORAL at 17:43

## 2017-11-19 RX ADMIN — ACETAMINOPHEN 1000 MG: 500 TABLET, FILM COATED ORAL at 03:42

## 2017-11-19 RX ADMIN — LEVOTHYROXINE SODIUM 88 MCG: 88 TABLET ORAL at 06:39

## 2017-11-19 RX ADMIN — ESTERIFIED ESTROGENS AND METHYLTESTOSTERONE 1 TABLET: .625; 1.25 TABLET ORAL at 09:15

## 2017-11-19 RX ADMIN — POLYETHYLENE GLYCOL 3350 17 G: 17 POWDER, FOR SOLUTION ORAL at 09:14

## 2017-11-19 RX ADMIN — DOCUSATE SODIUM AND SENNOSIDES 1 TABLET: 8.6; 5 TABLET, FILM COATED ORAL at 17:43

## 2017-11-19 RX ADMIN — DOCUSATE SODIUM AND SENNOSIDES 1 TABLET: 8.6; 5 TABLET, FILM COATED ORAL at 09:15

## 2017-11-19 RX ADMIN — ACETAMINOPHEN 1000 MG: 500 TABLET, FILM COATED ORAL at 17:43

## 2017-11-19 RX ADMIN — DIPHENHYDRAMINE HYDROCHLORIDE 12.5 MG: 50 INJECTION, SOLUTION INTRAMUSCULAR; INTRAVENOUS at 00:25

## 2017-11-19 RX ADMIN — MEDROXYPROGESTERONE ACETATE 2.5 MG: 2.5 TABLET ORAL at 09:19

## 2017-11-19 RX ADMIN — ACETAMINOPHEN 1000 MG: 500 TABLET, FILM COATED ORAL at 23:09

## 2017-11-19 NOTE — PROGRESS NOTES
Problem: Mobility Impaired (Adult and Pediatric)  Goal: *Acute Goals and Plan of Care (Insert Text)  Physical Therapy Goals  Initiated 11/18/2017    1. Patient will move from supine to sit and sit to supine  in bed with modified independence within 4 days. 2. Patient will perform sit to stand with modified independence within 4 days. 3. Patient will ambulate with supervision/set-up for 350 feet with the least restrictive device within 4 days. 4. Patient will ascend/descend 12 stairs with 1 handrail(s) with minimal assistance/contact guard assist within 4 days. 5. Patient will verbalize and demonstrate understanding of spinal precautions (No bending, lifting greater than 5 lbs, or twisting; log-roll technique; frequent repositioning as instructed) within 4 days. physical Therapy TREATMENT  Patient: Mai Domingo (87 y.o. female)  Date: 11/19/2017  Diagnosis: ADULT SCOLIOSIS, SPINAL STENOSIS L4-5  Kyphoscoliosis <principal problem not specified>  Procedure(s) (LRB):  ANTERIOR SPINAL FUSION L3-S1, LUMBAR LAMINECTOMY L4-5, POSTERIOR SPINAL FUSION T4-S1 (N/A) 4 Days Post-Op  Precautions: Fall, Back    ASSESSMENT:  Pt sitting up in bed, pleasant and agreeable to PM session. Pt reports feeling \"a bit\" better this afternoon but continues to have gas pains. Patient demonstrate active mobility of both LEs in bed this session but continues to have difficulty with mobilizing LLE out of bed, using gravity and RLE to assist.  Patient sit to edge of bed with Thea using bed rail and log roll technique. Pt stand to RW with Thea and verbal cues for hand placement. Patient amb 45ft using RW and Thea; pt demonstrate decreased gait speed with wide JAJA, decreased step length and clearance, and increased trunk sway. Verbal cues to avoid twisting during gait training. Pt return to room and sit to bedside chair; due to height of chair, step stool provided for foot support.   Pt report comfort with sitting up; pt remain seated in chair with needs in reach upon PT exit. Patient is progressing well and will have greatest potential to return to [facility] with inpatient rehab. Pt may also benefit from a supportive back brace, but unsure if pt would be able to tolerate such while experiencing distension and gas discomfort. Progression toward goals:  [x]      Improving appropriately and progressing toward goals  []      Improving slowly and progressing toward goals  []      Not making progress toward goals and plan of care will be adjusted     PLAN:  Patient continues to benefit from skilled intervention to address the above impairments. Continue treatment per established plan of care. Discharge Recommendations:  Inpatient Rehab  Further Equipment Recommendations for Discharge:  TBD; pt owns [facility]     SUBJECTIVE:   Patient stated O it feels so good to sit up.    The patient stated 3/3 back precautions. Reviewed all 3 with patient. OBJECTIVE DATA SUMMARY:   Critical Behavior:  Neurologic State: Alert  Orientation Level: Oriented X4  Cognition: Follows commands, Appropriate safety awareness, Appropriate for age attention/concentration, Appropriate decision making  Safety/Judgement: Awareness of environment, Fall prevention, Good awareness of safety precautions, Home safety, Insight into deficits    Functional Mobility Training:  Bed Mobility:   Supine to Sit: Minimum assistance  Scooting: Supervision     Transfers:  Sit to Stand: Minimum assistance; Additional time  Stand to Sit: Contact guard assistance              Balance:  Sitting: Impaired; Without support  Sitting - Static: Good (unsupported)  Sitting - Dynamic: Fair (occasional)  Standing: Impaired; With support  Standing - Static: Fair  Standing - Dynamic : Fair       Ambulation/Gait Training:  Distance (ft): 45 Feet, 15feet  Assistive Device: Walker, rolling;Gait belt  Ambulation - Level of Assistance: Minimal assistance  Gait Abnormalities: Decreased step clearance  Base of Support: Widened  Speed/Maddi: Pace decreased (<100 feet/min)  Step Length: Left shortened;Right shortened     Pain:  Pain Scale 1: Numeric (0 - 10)  Pain Intensity 1: 3     Activity Tolerance:   Good    Please refer to the flowsheet for vital signs taken during this treatment.   After treatment:   [x]  Patient left in no apparent distress sitting up in chair  []  Patient left in no apparent distress in bed  [x]  Call bell left within reach  [x]  Nursing notified  []  Caregiver present  []  Bed alarm activated    COMMUNICATION/COLLABORATION:   The patients plan of care was discussed with: Registered Nurse    Leighton Zee, PT, DPT   Time Calculation: 38 mins

## 2017-11-19 NOTE — PROGRESS NOTES
Problem: Mobility Impaired (Adult and Pediatric)  Goal: *Acute Goals and Plan of Care (Insert Text)  Physical Therapy Goals  Initiated 11/18/2017    1. Patient will move from supine to sit and sit to supine  in bed with modified independence within 4 days. 2. Patient will perform sit to stand with modified independence within 4 days. 3. Patient will ambulate with supervision/set-up for 350 feet with the least restrictive device within 4 days. 4. Patient will ascend/descend 12 stairs with 1 handrail(s) with minimal assistance/contact guard assist within 4 days. 5. Patient will verbalize and demonstrate understanding of spinal precautions (No bending, lifting greater than 5 lbs, or twisting; log-roll technique; frequent repositioning as instructed) within 4 days. physical Therapy TREATMENT  Patient: John Church (36 y.o. female)  Date: 11/19/2017  Diagnosis: ADULT SCOLIOSIS, SPINAL STENOSIS L4-5  Kyphoscoliosis <principal problem not specified>  Procedure(s) (LRB):  ANTERIOR SPINAL FUSION L3-S1, LUMBAR LAMINECTOMY L4-5, POSTERIOR SPINAL FUSION T4-S1 (N/A) 4 Days Post-Op  Precautions:      ASSESSMENT: Pt cleared for session per Joshua Aguilar RN. Patient in bed upon PT arrival, agreeable to session but reporting stomach pain 2/2 gas. Patient was reportedly given a suppository on Friday but has not been able to have a BM since then. Pt requesting to amb to restroom. Patient sit to edge of bed with moderate assistance; primary assistance for clearance of LEs. Patient demonstrate difficulty with active movement of LLE as compared to RLE. Once to edge of bed, patient initially requiring UE support progressing to no support but continued observed sway. Upon standing to RW with minimum assistance, pt with R postural sway and minimal loading through LLE. PT recommend stand pivot to [facility] versus to restroom this session; pt pivot to [facility] with minimum assistance. Pt unable to have a BM and return to edge of bed.   Pt tolerate sitting 10min at edge of bed while physician discuss POC and RN arrive to empty cath bag. Patient return to supine with moderate assistance and verbal reminders for log roll technique. Throughout session, pt's BP decrease from 145/69 (at rest, supine) to 123/70 (seated, post activity) with pt report of symptoms. Patient's O2 sats remain >94% on room air. Pt remain in bed with RN at bedside upon PT exit. Progression toward goals:  [x]      Improving appropriately and progressing toward goals  []      Improving slowly and progressing toward goals  []      Not making progress toward goals and plan of care will be adjusted     PLAN:  Patient continues to benefit from skilled intervention to address the above impairments. Continue treatment per established plan of care. Discharge Recommendations: To Be Determined  Further Equipment Recommendations for Discharge:  TBD     SUBJECTIVE:   Patient stated I want to be able to walk to the bathroom so they can take this catheter out.    The patient stated 3/3 back precautions. Reviewed all 3 with patient. OBJECTIVE DATA SUMMARY:   Critical Behavior:  Neurologic State: Alert,   Orientation Level: Oriented to place, Oriented to person, Oriented to situation, Disoriented to time  Cognition: Follows commands, Decreased attention/concentration     Functional Mobility Training:  Bed Mobility:  Log roll  Supine to Sit: Moderate assistance; Adaptive equipment; Additional time (for LEs)  Sit to Supine: Moderate assistance  Scooting: Minimum assistance        Transfers:  Sit to Stand: Minimum assistance; Additional time - verbal cues for proper hand placement  Stand to Sit: Minimum assistance              Balance:  Sitting: Impaired  Sitting - Static: Fair (occasional)  Sitting - Dynamic: Poor (constant support)  Standing: Impaired; With support  Standing - Static: Fair  Standing - Dynamic : Fair (-)       Ambulation/Gait Training:  Distance (ft): 3 Feet (ft) (x2)  Ambulation - Level of Assistance: Maximum assistance  Gait Abnormalities: Shuffling gait  Base of Support: Widened  Speed/Maddi: Slow;Shuffled       Pain:  Pain Scale 1: Numeric (0 - 10)  Pain Intensity 1: 3     Pain Intervention(s) 1: Medication (see MAR)    Activity Tolerance:   Fair    Please refer to the flowsheet for vital signs taken during this treatment.   After treatment:   []  Patient left in no apparent distress sitting up in chair  [x]  Patient left in no apparent distress in bed  [x]  Call bell left within reach  [x]  Nursing notified  []  Caregiver present  []  Bed alarm activated    COMMUNICATION/COLLABORATION:   The patients plan of care was discussed with: Registered Nurse and Physician     Brook Hayward PT, DPT   Time Calculation: 41 mins

## 2017-11-19 NOTE — PROGRESS NOTES
ORTHOPAEDIC LUMBAR FUSION PROGRESS NOTE    NAME: Summer Sherman   :  1945   MRN:  [id number]   DATE:  2017    POD: 4 Days Post-Op  S/P: Procedure(s):  ANTERIOR SPINAL FUSION L3-S1, LUMBAR LAMINECTOMY L4-5, POSTERIOR SPINAL FUSION T4-S1    SUBJECTIVE:      Patient is awake and oriented. She states is feeling better today than yesterday, back and abdominal incisions hurt but denies chest pain, n/v, sob or ha. No other acute complaints. Recent Labs      17   0350   HGB  8.5*   HCT  25.2*     Patient Vitals for the past 12 hrs:   BP Temp Pulse Resp SpO2   17 0843 123/70 97.8 °F (36.6 °C) (!) 108 18 94 %   17 0831 135/73 - 97 - 95 %   17 0700 145/69 - 79 - 95 %   17 0343 153/66 98.3 °F (36.8 °C) 75 20 -     Drain: removed    EXAM:  Positive strength/ROM bilat lower ext. Neuro intact  Dressings intact       PLAN:  - No headaches, belly discomfort as expected  PT/OT   Expected post-surgical anemia -will continue to monitor. If Hgb falls to 8.0 or below, will transfuse 2 units PRBC  D/c planning to inpatient rehab, possible Monday or Tuesday depending on progression.       Crystal Patel MD

## 2017-11-19 NOTE — PROGRESS NOTES
Problem: Self Care Deficits Care Plan (Adult)  Goal: *Acute Goals and Plan of Care (Insert Text)  Occupational Therapy Goals  Initiated 11/19/2017    1. Patient will perform lower body dressing with minimal assistance/contact guard assist using AE PRN within 7 days. 2.  Patient will perform bathing seated with minimal assistance/contact guard assist using most appropriate DME within 7 days. 3.  Patient will toileting at minimal assistance/contact guard assist within 7 days. 4.  Patient will don/doff back brace (as appropriate) at minimal assistance/contact guard assist within 7 days. 5.  Patient will verbalize/demonstrate 3/3 back precautions during ADL tasks without cues within 7 days. Occupational Therapy EVALUATION  Patient: John Church (7 y.o. female)  Date: 11/19/2017  Primary Diagnosis: ADULT SCOLIOSIS, SPINAL STENOSIS L4-5  Kyphoscoliosis  Procedure(s) (LRB):  ANTERIOR SPINAL FUSION L3-S1, LUMBAR LAMINECTOMY L4-5, POSTERIOR SPINAL FUSION T4-S1 (N/A) 4 Days Post-Op   Precautions:   Fall, Back    ASSESSMENT :  Based on the objective data described below, the patient presents with back pain, back precautions, c/o RLE numbness (able to detect light touch), impaired dynamic sitting balance, impaired standing balance, generally decreased AROM, functional strength and activity tolerance, s/p anterior L3-S1 fusion, L45 laminectomy and T4-S1 posterior fusion POD 4. Pt was on flat bedrest for 48 hours, however performing well with bed mobility and transfers despite prolonged bedrest. Pt alert and oriented x4. ADLs overall supervision/set-up to totalA, modA bed mobility, Thea for sit<>stand using RW for support. Pt educated on back precautions, importance of OOB activity, ADL progression, and AE use for LB ADLs. Feel pt would benefit from short term rehab stay, can tolerate 3 hours therapy per day; very motivated.  Next session: AE training for LB dressing, functional mobility to bathroom with RW pending progress with PT. Patient will benefit from skilled intervention to address the above impairments. Patients rehabilitation potential is considered to be Good  Factors which may influence rehabilitation potential include:   []             None noted  []             Mental ability/status  []             Medical condition  []             Home/family situation and support systems  []             Safety awareness  [x]             Pain tolerance/management  []             Other:      PLAN :  Recommendations and Planned Interventions:  [x]               Self Care Training                  [x]        Therapeutic Activities  [x]               Functional Mobility Training    []        Cognitive Retraining  [x]               Therapeutic Exercises           [x]        Endurance Activities  [x]               Balance Training                   []        Neuromuscular Re-Education  []               Visual/Perceptual Training     [x]   Home Safety Training  [x]               Patient Education                 [x]        Family Training/Education  []               Other (comment):    Frequency/Duration: Patient will be followed by occupational therapy 5 times a week to address goals. Discharge Recommendations: Rehab  Further Equipment Recommendations for Discharge: bill  bath sponge     SUBJECTIVE:   Patient stated I was dizzy this morning with [place] from PT but now I am better.     OBJECTIVE DATA SUMMARY:   HISTORY:   Past Medical History:   Diagnosis Date    Anxiety     Arrhythmia     A-FIB    Arthritis     BACK AND \"EVERYWHERE\"    Diabetes ([facility] [address]     TYPE II    GERD (gastroesophageal reflux disease)     Nausea & vomiting     Thyroid disease     HYPO     Past Surgical History:   Procedure Laterality Date    CARDIAC SURG PROCEDURE UNLIST  06/06/2017    A-FIB ABLATION    CARDIAC SURG PROCEDURE UNLIST  2014, 2016    CARDIOVERSION 3X FOR A-FIB    HX DILATION AND CURETTAGE  10/2009    HX GI  02/20/2017 ENDOSCOPY    HX GI      COLONOSCOPY    HX GYN  1980'S    LAP FOR ENDOMETRIOSIS    HX KNEE REPLACEMENT Right 11/08/2008    HX ORTHOPAEDIC Bilateral 2013, 2014    TRIGGER FINGER (RING FINGER)    HX ORTHOPAEDIC Bilateral 2005, 2015    BONE REMOVAL AND TENDON REPOSITIONING    HX ORTHOPAEDIC Right 08/05/2001    HAMMERTOE (4TH AND 5TH TOE)    HX ORTHOPAEDIC Right 03/2016    WRIST TENDON SNIPPING    HX TONSILLECTOMY  1953       Prior Level of Function/Environment/Context: Pt lives with , IND-Ace with ADLs. Uses SPC. Drives.  IND wears O2  Occupations in which the patient is/was successful, what are the barriers preventing that success:   Performance Patterns (routines, roles, habits, and rituals):   Personal Interests and/or values:   Expanded or extensive additional review of patient history:     Home Situation  Home Environment: Private residence  # Steps to Enter: 5  One/Two Story Residence: Two story  # of Interior Steps: 15  Interior Rails: Right  Lift Chair Available: No  Living Alone: No  Support Systems: Spouse/Significant Other/Partner  Patient Expects to be Discharged to[de-identified] Private residence  Current DME Used/Available at Home: Cane, straight, Walker, rolling, Grab bars (sock aide, LH shoe horn, reacher)  Tub or Shower Type: Tub/Shower combination  [x]  Right hand dominant   []  Left hand dominant    EXAMINATION OF PERFORMANCE DEFICITS:  Cognitive/Behavioral Status:  Neurologic State: Alert  Orientation Level: Oriented X4  Cognition: Follows commands; Appropriate safety awareness; Appropriate for age attention/concentration; Appropriate decision making  Perception: Appears intact  Perseveration: No perseveration noted  Safety/Judgement: Awareness of environment; Fall prevention;Good awareness of safety precautions; Home safety; Insight into deficits    Skin: dressing appears c/d/i    Edema: none noted in BUEs    Hearing:   Auditory  Auditory Impairment: Hard of hearing, bilateral  Hearing Aids/Status: Bilateral    Vision/Perceptual:    Tracking: Able to track stimulus in all quadrants w/o difficulty                 Diplopia: No         Corrective Lenses: Glasses    Range of Motion:    AROM: Within functional limits  PROM: Within functional limits                      Strength:    Strength: Generally decreased, functional                Coordination:  Coordination: Within functional limits  Fine Motor Skills-Upper: Right Intact; Left Intact    Gross Motor Skills-Upper: Left Intact; Right Intact    Tone & Sensation:    Tone: Normal  Sensation: Impaired (c/o RLE numbness/tingling, can detect light touch)                      Balance:  Sitting: Impaired; Without support  Sitting - Static: Good (unsupported)  Sitting - Dynamic: Fair (occasional)  Standing: Impaired; With support  Standing - Static: Fair  Standing - Dynamic : Fair    Functional Mobility and Transfers for ADLs:  Bed Mobility:  Supine to Sit: Moderate assistance;Assist x1;Additional time  Sit to Supine: Moderate assistance; Additional time;Assist x1  Scooting: Minimum assistance    Transfers:  Sit to Stand: Minimum assistance; Additional time;Assist x1  Stand to Sit: Minimum assistance; Additional time;Assist x1;Adaptive equipment  Toilet Transfer : Adaptive equipment; Additional time;Assist x1; Moderate assistance (inferred)    ADL Assessment:  Feeding: Setup    Oral Facial Hygiene/Grooming: Setup    Bathing: Maximum assistance; Additional time; Adaptive equipment;Assist x1    Upper Body Dressing: Moderate assistance; Adaptive equipment; Additional time;Assist x1    Lower Body Dressing: Maximum assistance; Adaptive equipment; Additional time;Assist x1    Toileting: Moderate assistance; Adaptive equipment; Additional time;Assist x1                ADL Intervention and task modifications:      Patient instructed and indicated understanding the benefits of maintaining activity tolerance, functional mobility, and independence with self care tasks during acute stay  to ensure safe return home and to baseline. Encouraged patient to increase frequency and duration OOB, not sitting longer than 30 mins without marching/walking with staff, be out of bed for all meals, perform daily ADLs (as approved by RN/MD regarding bathing etc), and performing functional mobility to/from bathroom. Patient instruction and indicated understanding on body mechanics, ergonomics and gravitational force on the spine during different body positions to plan activities in prep for return home to complete basic ADLs, instrumental ADLs and back to work safely. Dressing lower body: Patient instructed to don brace first (PRN) and on the benefits to remain seated to don all clothing to increase independence with precautions and pain management. Patient instructed and demonstrated tailor sitting for lower body dressing with totalA, unable to tailor st.     Patient instruction and indicated understanding to not strain i.e. holding breath to bear down during a bowel movement, lifting/activity, and sexual activity. Home safety: Patient instructed and indicated understanding on home modifications and safety [raise height of ADL objects (i.e. clothing, sink items, fridge items, items to mouth when grooming), appropriate height of chair surfaces, recliner safety, change of floor surfaces, clear pathways] to increase independence and fall prevention. Standing: Patient instructed and indicated understanding to walk up to sink/counter top/surfaces, step into walker, square off while using objects, slide objects along surfaces, to increase adherence to back precautions and fall prevention. Patient instructed to increase amount of time standing in order to increase independence and tolerance with ADLs. During prolonged standing, can open cabinet door or place foot on stool to decrease spinal pressure/increase pain. Cognitive Retraining  Safety/Judgement: Awareness of environment; Fall prevention;Good awareness of safety precautions; Home safety; Insight into deficits     Functional Measure:  Barthel Index:    Bathin  Bladder: 0  Bowels: 10  Groomin  Dressin  Feeding: 10  Mobility: 0  Stairs: 0  Toilet Use: 5  Transfer (Bed to Chair and Back): 10  Total: 45       Barthel and G-code impairment scale:  Percentage of impairment CH  0% CI  1-19% CJ  20-39% CK  40-59% CL  60-79% CM  80-99% CN  100%   Barthel Score 0-100 100 99-80 79-60 59-40 20-39 1-19   0   Barthel Score 0-20 20 17-19 13-16 9-12 5-8 1-4 0      The Barthel ADL Index: Guidelines  1. The index should be used as a record of what a patient does, not as a record of what a patient could do. 2. The main aim is to establish degree of independence from any help, physical or verbal, however minor and for whatever reason. 3. The need for supervision renders the patient not independent. 4. A patient's performance should be established using the best available evidence. Asking the patient, friends/relatives and nurses are the usual sources, but direct observation and common sense are also important. However direct testing is not needed. 5. Usually the patient's performance over the preceding 24-48 hours is important, but occasionally longer periods will be relevant. 6. Middle categories imply that the patient supplies over 50 per cent of the effort. 7. Use of aids to be independent is allowed. Julian Malone., Barthel, DAbyW. ([id number]). Functional evaluation: the Barthel Index. [address] [facility] (14)2. Ju Renae, CHAN.J.MAbyF, Jeannie Mendoza., Magdiel Payne., Valerie, [address] [place] [address] (). Measuring the change indisability after inpatient rehabilitation; comparison of the responsiveness of the Barthel Index and Functional [place] Measure. Journal of Neurology, Neurosurgery, and Psychiatry, 66(4), 429-103. LIZ Maurer, APURVA Ayala, & Maddy Baer M.A. (2004.) Assessment of post-stroke quality of life in cost-effectiveness studies:  The usefulness of the Barthel Index and the EuroQoL-5D. Quality of Life Research, 13, 146-22         G codes: In compliance with CMSs Claims Based Outcome Reporting, the following G-code set was chosen for this patient based on their primary functional limitation being treated: The outcome measure chosen to determine the severity of the functional limitation was the Barthel Index with a score of 45/100 which was correlated with the impairment scale. ? Self Care:     - CURRENT STATUS: CK - 40%-59% impaired, limited or restricted    - GOAL STATUS: CI - 1%-19% impaired, limited or restricted    - D/C STATUS:  ---------------To be determined---------------     Occupational Therapy Evaluation Charge Determination   History Examination Decision-Making   LOW Complexity : Brief history review  MEDIUM Complexity : 3-5 performance deficits relating to physical, cognitive , or psychosocial skils that result in activity limitations and / or participation restrictions MEDIUM Complexity : Patient may present with comorbidities that affect occupational performnce. Miniml to moderate modification of tasks or assistance (eg, physical or verbal ) with assesment(s) is necessary to enable patient to complete evaluation       Based on the above components, the patient evaluation is determined to be of the following complexity level: LOW   Activity Tolerance:   VSS    Please refer to the flowsheet for vital signs taken during this treatment. After treatment:   [] Patient left in no apparent distress sitting up in chair  [x] Patient left in no apparent distress in bed  [x] Call bell left within reach  [x] Nursing notified  [] Caregiver present  [] Bed alarm activated    COMMUNICATION/EDUCATION:   The patients plan of care was discussed with: Physical Therapist and Registered Nurse. [x] Home safety education was provided and the patient/caregiver indicated understanding.   [x] Patient/family have participated as able in goal setting and plan of care. [x] Patient/family agree to work toward stated goals and plan of care. [] Patient understands intent and goals of therapy, but is neutral about his/her participation. [] Patient is unable to participate in goal setting and plan of care. This patients plan of care is appropriate for delegation to KAMALJIT.     Thank you for this referral.  Niall Dukes OT  Time Calculation: 36 mins

## 2017-11-19 NOTE — PROGRESS NOTES
CM follow up. CM noted that PT and OT evaluations have been completed. Per request of unit based CM, CM sent referral via [employer] to [address] [place] [address]/ [facility]/ Inpatient Rehab, and response is pending.

## 2017-11-20 LAB
GLUCOSE BLD STRIP.AUTO-MCNC: 101 MG/DL (ref 65–100)
GLUCOSE BLD STRIP.AUTO-MCNC: 107 MG/DL (ref 65–100)
GLUCOSE BLD STRIP.AUTO-MCNC: 115 MG/DL (ref 65–100)
GLUCOSE BLD STRIP.AUTO-MCNC: 96 MG/DL (ref 65–100)
HCT VFR BLD AUTO: 24.8 % (ref 35–47)
HGB BLD-MCNC: 8.4 G/DL (ref 11.5–16)
SERVICE CMNT-IMP: ABNORMAL
SERVICE CMNT-IMP: NORMAL

## 2017-11-20 PROCEDURE — 74011250637 HC RX REV CODE- 250/637: Performed by: NURSE PRACTITIONER

## 2017-11-20 PROCEDURE — 65270000029 HC RM PRIVATE

## 2017-11-20 PROCEDURE — 97535 SELF CARE MNGMENT TRAINING: CPT

## 2017-11-20 PROCEDURE — 97116 GAIT TRAINING THERAPY: CPT

## 2017-11-20 PROCEDURE — 85018 HEMOGLOBIN: CPT | Performed by: PHYSICIAN ASSISTANT

## 2017-11-20 PROCEDURE — 74011250637 HC RX REV CODE- 250/637: Performed by: PHYSICIAN ASSISTANT

## 2017-11-20 PROCEDURE — 82962 GLUCOSE BLOOD TEST: CPT

## 2017-11-20 PROCEDURE — 74011000250 HC RX REV CODE- 250: Performed by: NURSE PRACTITIONER

## 2017-11-20 PROCEDURE — 36415 COLL VENOUS BLD VENIPUNCTURE: CPT | Performed by: PHYSICIAN ASSISTANT

## 2017-11-20 PROCEDURE — 97530 THERAPEUTIC ACTIVITIES: CPT

## 2017-11-20 RX ORDER — BACITRACIN 500 UNIT/G
1 PACKET (EA) TOPICAL ONCE
Status: COMPLETED | OUTPATIENT
Start: 2017-11-20 | End: 2017-11-20

## 2017-11-20 RX ADMIN — DOCUSATE SODIUM AND SENNOSIDES 1 TABLET: 8.6; 5 TABLET, FILM COATED ORAL at 09:31

## 2017-11-20 RX ADMIN — ESTERIFIED ESTROGENS AND METHYLTESTOSTERONE 1 TABLET: .625; 1.25 TABLET ORAL at 09:31

## 2017-11-20 RX ADMIN — ACETAMINOPHEN 1000 MG: 500 TABLET, FILM COATED ORAL at 23:30

## 2017-11-20 RX ADMIN — CYCLOBENZAPRINE HYDROCHLORIDE 5 MG: 10 TABLET, FILM COATED ORAL at 14:32

## 2017-11-20 RX ADMIN — ACETAMINOPHEN 1000 MG: 500 TABLET, FILM COATED ORAL at 10:53

## 2017-11-20 RX ADMIN — DOCUSATE SODIUM AND SENNOSIDES 1 TABLET: 8.6; 5 TABLET, FILM COATED ORAL at 17:48

## 2017-11-20 RX ADMIN — ACETAMINOPHEN 1000 MG: 500 TABLET, FILM COATED ORAL at 06:38

## 2017-11-20 RX ADMIN — BACITRACIN 1 PACKET: 500 OINTMENT TOPICAL at 09:31

## 2017-11-20 RX ADMIN — POLYETHYLENE GLYCOL 3350 17 G: 17 POWDER, FOR SOLUTION ORAL at 17:48

## 2017-11-20 RX ADMIN — TRAMADOL HYDROCHLORIDE 50 MG: 50 TABLET, FILM COATED ORAL at 17:48

## 2017-11-20 RX ADMIN — TRAMADOL HYDROCHLORIDE 50 MG: 50 TABLET, FILM COATED ORAL at 04:03

## 2017-11-20 RX ADMIN — TRAMADOL HYDROCHLORIDE 50 MG: 50 TABLET, FILM COATED ORAL at 10:53

## 2017-11-20 RX ADMIN — LEVOTHYROXINE SODIUM 88 MCG: 88 TABLET ORAL at 06:38

## 2017-11-20 RX ADMIN — POLYETHYLENE GLYCOL 3350 17 G: 17 POWDER, FOR SOLUTION ORAL at 09:30

## 2017-11-20 RX ADMIN — ACETAMINOPHEN 1000 MG: 500 TABLET, FILM COATED ORAL at 17:48

## 2017-11-20 RX ADMIN — MEDROXYPROGESTERONE ACETATE 2.5 MG: 2.5 TABLET ORAL at 09:31

## 2017-11-20 RX ADMIN — CYCLOBENZAPRINE HYDROCHLORIDE 5 MG: 10 TABLET, FILM COATED ORAL at 23:31

## 2017-11-20 NOTE — PROGRESS NOTES
Bedside and Verbal shift change report given to Celine (oncoming nurse) by Miriam Frazier (offgoing nurse). Report included the following information SBAR, Procedure Summary, Intake/Output, MAR, Recent Results and Med Rec Status.

## 2017-11-20 NOTE — PROGRESS NOTES
CM received call from [facility] liaison inquiring about discharge. Liaison to submit to their physician to determine approval for inpatient rehab. CM spoke to PA this morning who has placed order for custom fit TLSO brace. Per [facility], it may be tomorrow 11/21 before brace can be made and delivered. Pt will need to receive brace prior to discharge to use at rehab. Liaison will contact CM once decision has been made by their admitting physician. Will continue to follow. Anticipate discharge in 1-2 days pending brace fit and delivery and acceptance to rehab. URI Urbano     3:40 PM  CJW [facility] can accept pt for inpatient rehab tomorrow morning. Accepting physician is Dr. Adrian Laguerre. Nurse to call report to [phone]. CM to fax medicals to [phone]. CM will arrange for S transportation at time of discharge. Pt has been fitted for brace. Will await delivery (hopefully tomorrow) and discharge patient to [facility] inpatient rehab.     URI Urbano

## 2017-11-20 NOTE — PROGRESS NOTES
Problem: Self Care Deficits Care Plan (Adult)  Goal: *Acute Goals and Plan of Care (Insert Text)  Occupational Therapy Goals  Initiated 11/19/2017    1. Patient will perform lower body dressing with minimal assistance/contact guard assist using AE PRN within 7 days. 2.  Patient will perform bathing seated with minimal assistance/contact guard assist using most appropriate DME within 7 days. 3.  Patient will toileting at minimal assistance/contact guard assist within 7 days. 4.  Patient will don/doff back brace (as appropriate) at minimal assistance/contact guard assist within 7 days. 5.  Patient will verbalize/demonstrate 3/3 back precautions during ADL tasks without cues within 7 days. Occupational Therapy TREATMENT  Patient: Ashley Hess (2 y.o. female)  Date: 11/20/2017  Diagnosis: ADULT SCOLIOSIS, SPINAL STENOSIS L4-5  Kyphoscoliosis <principal problem not specified>  Procedure(s) (LRB):  ANTERIOR SPINAL FUSION L3-S1, LUMBAR LAMINECTOMY L4-5, POSTERIOR SPINAL FUSION T4-S1 (N/A) 5 Days Post-Op  Precautions: Fall, Back  Chart, occupational therapy assessment, plan of care, and goals were reviewed. ASSESSMENT:  Based on the objective data below, the patient participated in bed mobility, instruction on application of back precautions to self care and mobility, instruction in use of AE, sit to/from stand, sidestep with RW and move back to supine with supervision to mod assist. Performance impacted by wait for TLSO brace which patient was fit for this afternoon, impaired reach to LEs, impaired strength and endurance for functional activities, impaired mobility. Patient is able to recall 3/3 back precautions, follows 100% of commands, and demonstrates the ability to participate in 3 hours of therapy a day. Recommend Inpatient rehab.   Progression toward goals:  [x]          Improving appropriately and progressing toward goals  []          Improving slowly and progressing toward goals  []          Not making progress toward goals and plan of care will be adjusted     PLAN:  Patient continues to benefit from skilled intervention to address the above impairments. Continue treatment per established plan of care. Discharge Recommendations:  Inpatient Rehab  Further Equipment Recommendations for Discharge:  none     SUBJECTIVE:   Patient stated I can get up and work with you.     OBJECTIVE DATA SUMMARY:   Cognitive/Behavioral Status:  Neurologic State: Alert  Orientation Level: Oriented X4  Cognition: Appropriate for age attention/concentration; Appropriate decision making; Appropriate safety awareness; Follows commands  Perception: Appears intact  Perseveration: No perseveration noted  Safety/Judgement: Awareness of environment  Functional Mobility and Transfers for ADLs:   Bed Mobility:  Rolling: Supervision;Assist x1  Supine to Sit: Moderate assistance;Assist x1     Scooting: Supervision     Transfers:  Sit to Stand: Moderate assistance;Assist x1       Balance:  Sitting: Intact  Sitting - Static: Good (unsupported)  Sitting - Dynamic: Good (unsupported)  Standing: Impaired  Standing - Static: Fair  Standing - Dynamic : Fair  ADL Intervention:                 Lower Body Bathing  Adaptive Equipment: Long handled sponge (Verbal instruction in use while seated)         Lower Body Dressing Assistance  Pants With Elastic Waist: Maximum assistance (inferred from mobility)  Socks: Moderate assistance  Leg Crossed Method Used: No  Adaptive Equipment Used: Reacher;Walker;Sock aid (instruction in and practiced use for socks; states she has a reacher and flexible sock aide)         Cognitive Retraining  Safety/Judgement: Awareness of environment      Activity Tolerance:    Fair  Please refer to the flowsheet for vital signs taken during this treatment.   After treatment:   []  Patient left in no apparent distress sitting up in chair  [x]  Patient left in no apparent distress in bed  [x]  Call bell left within reach  [x] Nursing notified  []  Caregiver present  []  Bed alarm activated    COMMUNICATION/COLLABORATION:   The patients plan of care was discussed with: Registered Nurse.  Physical Therapist    JJ Ortiz  Time Calculation: 30 mins

## 2017-11-20 NOTE — DISCHARGE INSTRUCTIONS
[facility], [facility]. ROMIE Farris P.A.-C Leonarda Brilliant, PA-C  [id number]     Lumbar Surgery Discharge Instructions    Activities  1. You are going home a well person, be as active as possible. Your only exercise should be walking. Start with short frequent walks and increase your walking distance each day. Limit the amount of time you sit to 20-30 minute intervals. Sitting for prolonged periods of time will be uncomfortable for you following your surgery. 2. Do not lift anything over five pounds. 3. Do not do any straining, twisting or bending. 4. When you are in the bed, you may lay on your back or on either side. Do not lay on your stomach. Diet   You may resume your normal diet. If your throat is sore, you may want to eat soft foods for a few days. Be sure to drink plenty of fluids, it is important to keep yourself hydrated.  Avoid alcoholic beverages and tobacco products. Medications  1. Do not take anti-inflammatory medications or aspirin unless instructed by your physician. 2. Take your pain medication as directed. 3. It is important to have regular bowel movements. Pain medications may cause constipation. Stool softeners, prune juice, and increasing your water and fiber intake may help in preventing constipation. 4. Laxatives should only be used if the above preventative measures have failed and you still have not had a bowel movement after three days. Driving  You may not drive or return to work until instructed by your physician. However, you may ride in the car for short periods of time. Brace   If you have a back brace, you should wear your brace at all times when you are out of bed. Do not wear the brace while in bed or showering.  Remember to always wear a cotton t-shirt underneath your brace.  Do not bend or twist when your brace is off. Showering  1.  You may shower with the Prineo dressing in place - it is designed to be watertight. 2. Leave the dressing on during your shower allowing the water to run over it. 3. Reminder- your brace can be removed while showering. Remember to not bend or twist while your brace is off.    4. Do not take a tub bath. Caring for your incision  1. You have a new dressing applied to your incision called a Prineo dressing, covered with gauze. You should leave this dressing in place until you are seen in the office in two weeks. 2. You may have steri strips on your incision (small, white pieces of tape). Do not pull the steri strips; they will fall off on their own after several days. If you have sutures or staples, they will be removed when you see your physician. 3. Do not rub or apply any lotions or ointments to your incision site. Stockings  You have been given T.E.D. stockings to wear. Continue to wear these for 7 days after your discharge. Put them on in the morning and take them off at night. (You may require some assistance with this task as you may be restricted in your bending.)  They are used to increase your circulation and prevent blood clots from forming in your legs. Follow Up  Once you are home, call your physicians office to schedule an appointment for your two-week postoperative visit. Contact Dr. John Daniels at [phone], [phone]. Notify your physician if you develop any of the following conditions:  1. Fever above 101 degrees for 24 hours. 2. Nausea or vomiting. 3. Severe headache. 4. Inability to urinate. 5. Loss of bowel or bladder function. 6. Changes in sensation in your extremities (numbness, tingling, loss of color) that was not present before your surgery. 7. Severe pain or pain not relieved by medications. 8. Redness, swelling, or drainage from your incision. 9. Persistent pain in the chest or calf of either leg. 10. Increased weakness (if this is greater than before your surgery).       Raeann Nctrwcmbgflk-[phone] (after hours call [phone])  Dr. Andre Riojas

## 2017-11-20 NOTE — PROGRESS NOTES
Occupational Therapy: Nurse reports that patient just had IV line pulled and must be on bed rest for 45 more minutes. Will follow and see as able and appropriate. Has not been measured for TLSO yet.    JJ Abreu/KOLTON

## 2017-11-20 NOTE — PROGRESS NOTES
Problem: Mobility Impaired (Adult and Pediatric)  Goal: *Acute Goals and Plan of Care (Insert Text)  Physical Therapy Goals  Initiated 11/18/2017    1. Patient will move from supine to sit and sit to supine  in bed with modified independence within 4 days. 2. Patient will perform sit to stand with modified independence within 4 days. 3. Patient will ambulate with supervision/set-up for 350 feet with the least restrictive device within 4 days. 4. Patient will ascend/descend 12 stairs with 1 handrail(s) with minimal assistance/contact guard assist within 4 days. 5. Patient will verbalize and demonstrate understanding of spinal precautions (No bending, lifting greater than 5 lbs, or twisting; log-roll technique; frequent repositioning as instructed) within 4 days.    physical Therapy TREATMENT  Patient: Ashley Hess (54 y.o. female)  Date: 11/20/2017  Diagnosis: ADULT SCOLIOSIS, SPINAL STENOSIS L4-5  Kyphoscoliosis <principal problem not specified>  Procedure(s) (LRB):  ANTERIOR SPINAL FUSION L3-S1, LUMBAR LAMINECTOMY L4-5, POSTERIOR SPINAL FUSION T4-S1 (N/A) 5 Days Post-Op  Precautions: Fall, Back,No bending, no lifting greater than 5 lbs, no twisting, log-roll technique, repositioning every 20-30 min except when sleeping, brace when OOB(casted today, to be delivered in 1 to 2 days)    ASSESSMENT:  Pt is making slow, steady progress, pt resting in bed but agreeable to therapy, transfers with cues for log roll technique as pt initiated transfer by sliding leg off edge of bed, pt completed transfer with minimal assist, sit to stand with CGA, ambulated with rolling walker x 100 feet, slow pace, LLE trendelenburg noted which increased as pt fatigued, assisted back to bed with minimal assist, pt continues to need occasional cues to avoid twisting, pt asking several good questions about precautions today, recommend inpatient rehab  Progression toward goals:  []      Improving appropriately and progressing toward goals  [x]      Improving slowly and progressing toward goals  []      Not making progress toward goals and plan of care will be adjusted     PLAN:  Patient continues to benefit from skilled intervention to address the above impairments. Continue treatment per established plan of care. Discharge Recommendations:  Inpatient Rehab  Further Equipment Recommendations for Discharge: To be determined at rehab     SUBJECTIVE:   Patient stated My back hurt this time.    The patient stated 3/3 back precautions. Reviewed all 3 with patient. Pt continues to need occasional reminders to avoid twisting. Pt asking what is the reason for back precautions. OBJECTIVE DATA SUMMARY:   Critical Behavior:  Neurologic State: Alert  Orientation Level: Oriented X4  Cognition: Follows commands, Appropriate decision making, Appropriate for age attention/concentration, Appropriate safety awareness  Safety/Judgement: Awareness of environment, Fall prevention, Good awareness of safety precautions, Home safety, Insight into deficits  Functional Mobility Training:  Bed Mobility:  Pt initiated transfer by sliding RLE off edge of bed, reviewed log roll technique  Log Rolling: supervision; Additional time;Assist x1  Supine to Sit: Minimum assistance; Additional time;Assist x1     Scooting: Supervision       Transfers:  Sit to Stand: Contact guard assistance;Assist x1;Additional time  Stand to Sit: Contact guard assistance; Additional time;Assist x1 (cues to avoid twisting and looking over shoulder at bed)                             Balance:  Sitting: Intact  Standing: Impaired  Standing - Static: Good  Standing - Dynamic : Fair  Ambulation/Gait Training:  Distance (ft): 100 Feet (ft)  Assistive Device: Gait belt;Walker, rolling  Ambulation - Level of Assistance: Minimal assistance;Assist x1        Gait Abnormalities: Trunk sway increased;Decreased step clearance, LLE trendelenburg               Speed/Maddi: Slow  Step Length: Left shortened;Right shortened          Pain:  Pain Scale 1: Numeric (0 - 10)  Pain Intensity 1: 4  Pain Location 1: Back  Pain Orientation 1: Lower;Mid  Pain Description 1: Aching  Pain Intervention(s) 1 see MAR  Activity Tolerance:   good    After treatment:   []  Patient left in no apparent distress sitting up in chair  [x]  Patient left in no apparent distress in bed  [x]  Call bell left within reach  [x]  Nursing notified  []  Caregiver present  []  Bed alarm activated    COMMUNICATION/COLLABORATION:   The patients plan of care was discussed with: Registered Nurse    Julia Hendrickson   Time Calculation: 23 mins

## 2017-11-20 NOTE — PROGRESS NOTES
Problem: Mobility Impaired (Adult and Pediatric)  Goal: *Acute Goals and Plan of Care (Insert Text)  Physical Therapy Goals  Initiated 11/18/2017    1. Patient will move from supine to sit and sit to supine  in bed with modified independence within 4 days. 2. Patient will perform sit to stand with modified independence within 4 days. 3. Patient will ambulate with supervision/set-up for 350 feet with the least restrictive device within 4 days. 4. Patient will ascend/descend 12 stairs with 1 handrail(s) with minimal assistance/contact guard assist within 4 days. 5. Patient will verbalize and demonstrate understanding of spinal precautions (No bending, lifting greater than 5 lbs, or twisting; log-roll technique; frequent repositioning as instructed) within 4 days.    physical Therapy TREATMENT  Patient: Gaby Alvarez (13 y.o. female)  Date: 11/20/2017  Diagnosis: ADULT SCOLIOSIS, SPINAL STENOSIS L4-5  Kyphoscoliosis <principal problem not specified>  Procedure(s) (LRB):  ANTERIOR SPINAL FUSION L3-S1, LUMBAR LAMINECTOMY L4-5, POSTERIOR SPINAL FUSION T4-S1 (N/A) 5 Days Post-Op  Precautions: Fall, Back,No bending, no lifting greater than 5 lbs, no twisting, log-roll technique, repositioning every 20-30 min except when sleeping, brace when OOB(brace ordered today)      ASSESSMENT:  Pt is making steady gains toward goals, cleared for therapy per nurse and pt agreeable to participate at this time, pt recalls back precautions however needs occasional cues to avoid twisting, pt completed log roll and transfer to EOB with cueing and minimal assistance, stands with CGA, tolerated ambulation x 80 feet with rolling walker and minimal assist x 1, has increased lateral sway and occasionally with sudden shift toward the right side,  pt aware and making good effort to stand upright while using the walker, assisted to bedside commode, total assist for hygiene, provided pt with a regular bedside chair versus using the high chair, agreeable to sit up in chair at end of session, reminded pt about repositioning frequently while sitting, recommend inpatient rehab to maximize strength and safe, functional mobility   Progression toward goals:  []      Improving appropriately and progressing toward goals  [x]      Improving slowly and progressing toward goals  []      Not making progress toward goals and plan of care will be adjusted     PLAN:  Patient continues to benefit from skilled intervention to address the above impairments. Continue treatment per established plan of care. Discharge Recommendations:  Inpatient Rehab  Further Equipment Recommendations for Discharge: To be determined     SUBJECTIVE:   Patient stated I am not having any pain. I had Tramadol.   Pt later reports abdominal discomfort but may be gas. The patient stated 3/3 back precautions. Reviewed all 3 with patient. She needed occasional reminders to avoid twisting. OBJECTIVE DATA SUMMARY:   Critical Behavior:  Neurologic State: Alert  Orientation Level: Oriented X4  Cognition: Follows commands, Appropriate decision making, Appropriate for age attention/concentration  Safety/Judgement: Awareness of environment, Fall prevention, Good awareness of safety precautions, Home safety, Insight into deficits  Functional Mobility Training:  Bed Mobility:  Log Rolling: Minimum assistance; Additional time;Assist x1  Supine to Sit: Minimum assistance; Additional time;Assist x1     Scooting: Supervision          Transfers:  Sit to Stand: Contact guard assistance;Assist x1;Additional time  Stand to Sit: Contact guard assistance; Additional time;Assist x1 (cues to avoid twisting and looking over shoulder at chair )                             Balance:  Sitting: Intact  Standing: Impaired  Standing - Static: Good  Standing - Dynamic : Fair  Ambulation/Gait Training:  Distance (ft): 80 Feet (ft)  Assistive Device: Gait belt;Walker, rolling  Ambulation - Level of Assistance: Minimal assistance;Assist x1        Gait Abnormalities: Trunk sway increased with occasional sudden shift to right side;Decreased step clearance              Speed/Maddi: Slow  Step Length: Left shortened;Right shortened                Pain:  Pain Scale 1: Numeric (0 - 10)   Denies back pain  Activity Tolerance:   Gradually improving     After treatment:   [x]  Patient left in no apparent distress sitting up in chair  []  Patient left in no apparent distress in bed  [x]  Call bell left within reach  [x]  Nursing notified  []  Caregiver present  []  Bed alarm activated    COMMUNICATION/COLLABORATION:   The patients plan of care was discussed with: Registered Nurse    Julia Coello   Time Calculation: 26 mins

## 2017-11-20 NOTE — PROGRESS NOTES
ORTHOPAEDIC LUMBAR FUSION PROGRESS NOTE    NAME: Walt Juan   :  1945   MRN:  [id number]   DATE:  2017    POD: 5 Days Post-Op  S/P: Procedure(s):  ANTERIOR SPINAL FUSION L3-S1, LUMBAR LAMINECTOMY L4-5, POSTERIOR SPINAL FUSION T4-S1    SUBJECTIVE:    Patient found lying in bad this morning, c/o slight abdominal pain that tramadol is helping with. She notes that she has been up, OOB walking with PT/OT but needs assistance. Denies leg pain/numbness -no chest pain, n/v, sob or ha. Has been passing gas and voiding. Patient does not have a TLSO brace, however Dr. Giovany Miguel has requested today that [facility] be consulted. Pt has expressed interest in inpatient rehab at [address] [place] [address] if possible, but discussed [facility] as well. Afebrile/VSS. Denies any other acute complaints at this time. Recent Labs      17   0330   HGB  8.4*   HCT  24.8*     Patient Vitals for the past 12 hrs:   BP Temp Pulse Resp SpO2   17 0904 155/69 98.7 °F (37.1 °C) 72 16 97 %   17 0325 127/58 98.8 °F (37.1 °C) 77 16 95 %       EXAM:  Positive strength/ROM bilat lower ext. Neuro intact  Dressings clean, dry and intact with exception of small area of clear drainage lower segment.     PLAN:  Consult to [facility] for [place]  Continue PT/OT -OOB  D/c planning to inpatient rehab when accepted      Rolf Duenas PA-C

## 2017-11-21 ENCOUNTER — APPOINTMENT (OUTPATIENT)
Dept: GENERAL RADIOLOGY | Age: 72
DRG: 454 | End: 2017-11-21
Attending: PHYSICIAN ASSISTANT
Payer: MEDICARE

## 2017-11-21 LAB
GLUCOSE BLD STRIP.AUTO-MCNC: 102 MG/DL (ref 65–100)
GLUCOSE BLD STRIP.AUTO-MCNC: 123 MG/DL (ref 65–100)
GLUCOSE BLD STRIP.AUTO-MCNC: 87 MG/DL (ref 65–100)
GLUCOSE BLD STRIP.AUTO-MCNC: 92 MG/DL (ref 65–100)
HCT VFR BLD AUTO: 25.4 % (ref 35–47)
HGB BLD-MCNC: 8.5 G/DL (ref 11.5–16)
SERVICE CMNT-IMP: ABNORMAL
SERVICE CMNT-IMP: ABNORMAL
SERVICE CMNT-IMP: NORMAL
SERVICE CMNT-IMP: NORMAL

## 2017-11-21 PROCEDURE — 97535 SELF CARE MNGMENT TRAINING: CPT

## 2017-11-21 PROCEDURE — 97116 GAIT TRAINING THERAPY: CPT

## 2017-11-21 PROCEDURE — 65270000029 HC RM PRIVATE

## 2017-11-21 PROCEDURE — 74011250637 HC RX REV CODE- 250/637: Performed by: ORTHOPAEDIC SURGERY

## 2017-11-21 PROCEDURE — 74000 XR ABD (KUB): CPT

## 2017-11-21 PROCEDURE — 74011250637 HC RX REV CODE- 250/637: Performed by: NURSE PRACTITIONER

## 2017-11-21 PROCEDURE — 74011250637 HC RX REV CODE- 250/637: Performed by: PHYSICIAN ASSISTANT

## 2017-11-21 PROCEDURE — 36415 COLL VENOUS BLD VENIPUNCTURE: CPT | Performed by: PHYSICIAN ASSISTANT

## 2017-11-21 PROCEDURE — 85018 HEMOGLOBIN: CPT | Performed by: PHYSICIAN ASSISTANT

## 2017-11-21 PROCEDURE — 82962 GLUCOSE BLOOD TEST: CPT

## 2017-11-21 RX ORDER — FACIAL-BODY WIPES
10 EACH TOPICAL DAILY
Status: DISCONTINUED | OUTPATIENT
Start: 2017-11-21 | End: 2017-11-22 | Stop reason: HOSPADM

## 2017-11-21 RX ADMIN — LEVOTHYROXINE SODIUM 88 MCG: 88 TABLET ORAL at 08:25

## 2017-11-21 RX ADMIN — BISACODYL 10 MG: 10 SUPPOSITORY RECTAL at 09:51

## 2017-11-21 RX ADMIN — ACETAMINOPHEN 1000 MG: 500 TABLET, FILM COATED ORAL at 19:38

## 2017-11-21 RX ADMIN — SODIUM PHOSPHATE, DIBASIC AND SODIUM PHOSPHATE, MONOBASIC 118 ML: 7; 19 ENEMA RECTAL at 13:11

## 2017-11-21 RX ADMIN — MEDROXYPROGESTERONE ACETATE 2.5 MG: 2.5 TABLET ORAL at 09:51

## 2017-11-21 RX ADMIN — POLYETHYLENE GLYCOL 3350 17 G: 17 POWDER, FOR SOLUTION ORAL at 09:51

## 2017-11-21 RX ADMIN — ACETAMINOPHEN 1000 MG: 500 TABLET, FILM COATED ORAL at 11:43

## 2017-11-21 RX ADMIN — CYCLOBENZAPRINE HYDROCHLORIDE 5 MG: 10 TABLET, FILM COATED ORAL at 11:43

## 2017-11-21 RX ADMIN — ESTERIFIED ESTROGENS AND METHYLTESTOSTERONE 1 TABLET: .625; 1.25 TABLET ORAL at 09:51

## 2017-11-21 RX ADMIN — POLYETHYLENE GLYCOL 3350 17 G: 17 POWDER, FOR SOLUTION ORAL at 19:37

## 2017-11-21 RX ADMIN — DOCUSATE SODIUM AND SENNOSIDES 1 TABLET: 8.6; 5 TABLET, FILM COATED ORAL at 09:51

## 2017-11-21 RX ADMIN — ACETAMINOPHEN 1000 MG: 500 TABLET, FILM COATED ORAL at 23:06

## 2017-11-21 RX ADMIN — DOCUSATE SODIUM AND SENNOSIDES 1 TABLET: 8.6; 5 TABLET, FILM COATED ORAL at 19:37

## 2017-11-21 RX ADMIN — CYCLOBENZAPRINE HYDROCHLORIDE 5 MG: 10 TABLET, FILM COATED ORAL at 19:37

## 2017-11-21 NOTE — PROGRESS NOTES
Problem: Discharge Planning  Goal: *Discharge to safe environment  Outcome: Progressing Towards Goal  Still awaiting delivery of TLSO brace. Spoke to [employer] who stated brace would not be delivered until 6PM this evening at the earliest. CM contacted [facility] to explain discharge barrier- [facility] unable to accept pt this evening after 6PM and cannot obtain brace at their facility. Will defer discharge until tomorrow pending brace delivery. Pt will discharge to [facility] tomorrow morning 11/22 by ambulance. Pt has been updated at the bedside on discharge plan. Will continue to follow.     URI Pulido

## 2017-11-21 NOTE — PROGRESS NOTES
ORTHOPAEDIC LUMBAR FUSION PROGRESS NOTE    NAME: Mary Melvin   :  1945   MRN:  [id number]   DATE:  2017    POD: 6 Days Post-Op  S/P: Procedure(s):  ANTERIOR SPINAL FUSION L3-S1, LUMBAR LAMINECTOMY L4-5, POSTERIOR SPINAL FUSION T4-S1    SUBJECTIVE:    Patient found lying in bed this morning, left lateral c/o abdominal pain/bloating. States she has been passing small amount of gas and had a small bowel movement yesterday but feels abdomin is very distended. Notes pain in hips is improving -has been oob, walking with PT/OT. Denies c/p, n/v, sob or ha. Afebrile, VSS. Denies new complaints. Per CM notes, patient is accepted to [address] [place] [address] rehab. Awaiting TLSO delivery today and discussed anticipated d/c to rehab today with patient, pending negative diagnostic study. Patient is in agreement. Recent Labs      17   0509   HGB  8.5*   HCT  25.4*     Patient Vitals for the past 12 hrs:   BP Temp Pulse Resp SpO2   17 0326 171/75 98.9 °F (37.2 °C) 73 16 96 %   17 159/68 99 °F (37.2 °C) 73 16 99 %       EXAM:  Positive strength/ROM bilat lower ext. Neuro intact  Noted bloody/serous drainage from lower aspect of incision -incision intact without evidence of dehiscence.    -will continue to reinforce dressing    PLAN:  Expected post-surgical anemia -stable, will continue to monitor  Reinforce dressing to posterior incision  Continue PT/OT -OOB  TLSO to be delivered today  KUB ordered for this morning  D/c planning continues to be for today to [address] [place] [address] rehab pending results of KUB and delivery of TLSO   -d/c order on chart from 2017    Rolf Duenas PA-C

## 2017-11-21 NOTE — PROGRESS NOTES
Problem: Self Care Deficits Care Plan (Adult)  Goal: *Acute Goals and Plan of Care (Insert Text)  Occupational Therapy Goals  Initiated 11/19/2017    1. Patient will perform lower body dressing with minimal assistance/contact guard assist using AE PRN within 7 days. 2.  Patient will perform bathing seated with minimal assistance/contact guard assist using most appropriate DME within 7 days. 3.  Patient will toileting at minimal assistance/contact guard assist within 7 days. 4.  Patient will don/doff back brace (as appropriate) at minimal assistance/contact guard assist within 7 days. 5.  Patient will verbalize/demonstrate 3/3 back precautions during ADL tasks without cues within 7 days. Occupational Therapy TREATMENT  Patient: Cintia Ko (30 y.o. female)  Date: 11/21/2017  Diagnosis: ADULT SCOLIOSIS, SPINAL STENOSIS L4-5  Kyphoscoliosis <principal problem not specified>  Procedure(s) (LRB):  ANTERIOR SPINAL FUSION L3-S1, LUMBAR LAMINECTOMY L4-5, POSTERIOR SPINAL FUSION T4-S1 (N/A) 6 Days Post-Op  Precautions: Fall, Back No bending, no lifting greater than 5 lbs, no twisting, log-roll technique, repositioning every 20-30 min except when sleeping, TLSO brace when OOB    ASSESSMENT:  Cleared by RN to see pt for therapy session, pt still awaiting arrival of TLSO brace. Pt received supine in bed, agreeable to participate in session despite feeling fatigued. Recalled 3/3 back precautions without prompting, although required minimal verbal cueing to maintain precautions during session. Reviewed steps of logroll with pt and she performed supine>sit with min A. Pt stood to RW with min A and ambulated to bathroom, mild unsteadiness but no LOB, performed toilet transfer to raised seat with min A and cues to avoid twisting. Pt unable to have bowel movement at that time, but educated pt on use of toilet aid to avoid twisting, and pt demonstrated bowel hygiene with CGA using toilet aid.  Reviewed use of other pieces of LB AE introduced in previous session, and educated pt on use of reacher to don underwear and pants. Returned to supine in bed with min A and cueing for logroll technique. Call bell within reach and needs met at end of session. Pt will benefit from continued OT to work towards the above goals. Recommend inpatient rehab at discharge. Progression toward goals:  [x]          Improving appropriately and progressing toward goals  []          Improving slowly and progressing toward goals  []          Not making progress toward goals and plan of care will be adjusted     PLAN:  Patient continues to benefit from skilled intervention to address the above impairments. Continue treatment per established plan of care. Discharge Recommendations:  Inpatient Rehab  Further Equipment Recommendations for Discharge:  TBD at rehab     SUBJECTIVE:   Patient stated I need to practice with that toilet thing.   The patient stated 3/3 back precautions. Reviewed all 3 with patient. OBJECTIVE DATA SUMMARY:   Cognitive/Behavioral Status:  Neurologic State: Alert  Orientation Level: Oriented X4  Cognition: Follows commands  Safety/Judgement: Awareness of environment, Fall prevention, Home safety    Functional Mobility and Transfers for ADLs:  Bed Mobility:  Rolling: Supervision;Assist x1  Supine to Sit: Minimum assistance  Sit to Supine: Minimum assistance  Scooting: Supervision    Transfers:  Sit to Stand: Minimum assistance  Functional Transfers  Toilet Transfer : Minimum assistance    Balance:  Sitting: Intact  Standing: Impaired; With support (RW)  Standing - Static: Good  Standing - Dynamic : Fair    ADL Intervention and Instruction:    Recalled 3/3 back precautions without prompting, although required minimal verbal cueing to maintain precautions during session. Reviewed steps of logroll with pt and she performed supine>sit with min A.  Pt stood to RW with min A and ambulated to bathroom, mild unsteadiness but no LOB, performed toilet transfer to raised seat with min A and cues to avoid twisting. Pt unable to have bowel movement at that time, but educated pt on use of toilet aid to avoid twisting, and pt demonstrated bowel hygiene with CGA using toilet aid. Reviewed use of other pieces of LB AE introduced in previous session, and educated pt on use of reacher to don underwear and pants. Grooming  Grooming Assistance: Contact guard assistance (in standing)  Washing Hands: Contact guard assistance      Toileting  Toileting Assistance: Contact guard assistance  Adaptive Equipment: Toilet tongs    Cognitive Retraining  Safety/Judgement: Awareness of environment; Fall prevention;Home safety  Bathing: Patient instructed and indicated understanding when bathing to not submerge wound in water, stand to shower or sponge bathe, cover wound with plastic and tape to ensure no water reaches bandage/wound without cues. Dressing lower body: Patient instructed to don brace first and on the benefits to remain seated to don all clothing to increase independence with precautions and pain management. Toileting: Patient instructed on the benefits of using flushable wet wipes and toilet tongs if decreased reach or pain for dasia care. Also, the benefits of a reacher to aid in clothing management. Home safety: Patient instructed and indicated understanding on home modifications and safety (raise height of ADL objects, appropriate height of chair surfaces, recliner safety, change of floor surfaces, clear pathways) to increase independence and fall prevention. Standing: Patient instructed and indicated understanding to walk up to sink/counter top/surfaces, step into walker, square off while using objects, slide objects along surfaces, to increase adherence to back precautions and fall prevention. Patient instructed to increase amount of time standing in order to increase independence and tolerance with ADLs.  During prolonged standing, can open cabinet door or place foot on stool to decrease spinal pressure/increase pain. Patient instructed and indicated understanding the benefits of maintaining activity tolerance, functional mobility, and independence with self care tasks during acute stay  to ensure safe return home and to baseline. Encouraged patient to increase frequency and duration OOB, not sitting longer than 30 mins without marching/walking with staff, be out of bed for all meals, perform daily ADLs (as approved by RN/MD regarding bathing etc), and performing functional mobility to/from bathroom. Patient instruction and indicated understanding on body mechanics, ergonomics and gravitational force on the spine during different body positions to plan activities in prep for return home to complete instrumental ADLs and back to work safely. Pain:  Pain Scale 1: Numeric (0 - 10)  Pain Intensity 1: 5  Pain Location 1: Back  Pain Orientation 1: Mid  Pain Description 1: Aching  Pain Intervention(s) 1: Medication (see MAR)  Activity Tolerance:   Good  Please refer to the flowsheet for vital signs taken during this treatment.   After treatment:   [] Patient left in no apparent distress sitting up in chair  [x] Patient left in no apparent distress in bed  [x] Call bell left within reach  [x] Nursing notified  [] Caregiver present  [] Bed alarm activated    COMMUNICATION/COLLABORATION:   The patients plan of care was discussed with: Registered Nurse    Anette Fernandez OT  Time Calculation: 27 mins

## 2017-11-21 NOTE — PROGRESS NOTES
Problem: Mobility Impaired (Adult and Pediatric)  Goal: *Acute Goals and Plan of Care (Insert Text)  Physical Therapy Goals  Initiated 11/18/2017    1. Patient will move from supine to sit and sit to supine  in bed with modified independence within 4 days. 2. Patient will perform sit to stand with modified independence within 4 days. 3. Patient will ambulate with supervision/set-up for 350 feet with the least restrictive device within 4 days. 4. Patient will ascend/descend 12 stairs with 1 handrail(s) with minimal assistance/contact guard assist within 4 days. 5. Patient will verbalize and demonstrate understanding of spinal precautions (No bending, lifting greater than 5 lbs, or twisting; log-roll technique; frequent repositioning as instructed) within 4 days.    physical Therapy TREATMENT  Patient: Ellie Wynn (80 y.o. female)  Date: 11/21/2017  Diagnosis: ADULT SCOLIOSIS, SPINAL STENOSIS L4-5  Kyphoscoliosis <principal problem not specified>  Procedure(s) (LRB):  ANTERIOR SPINAL FUSION L3-S1, LUMBAR LAMINECTOMY L4-5, POSTERIOR SPINAL FUSION T4-S1 (N/A) 6 Days Post-Op  Precautions: Fall, Back,No bending, no lifting greater than 5 lbs, no twisting, log-roll technique, repositioning every 20-30 min except when sleeping, brace when OOB(waiting for brace to be delivered)      ASSESSMENT:  Pt c/o increased back pain with mobility this date, stating she had Tylenol for pain control however pt moving slower and demonstrated decreased tolerance for activity, rated pain 8/10, pt needs verbal cueing and review of log roll technique, completed transfer to EOB with minimal assist, sit to stand and ambulated with rolling walker with minimal assist x 1 x 80 feet, has left trendelenburg with increased trunk sway, continues to be at risk for falls, good candidate for inpatient rehab  Progression toward goals:  []      Improving appropriately and progressing toward goals  [x]      Improving slowly and progressing toward goals  []      Not making progress toward goals and plan of care will be adjusted     PLAN:  Patient continues to benefit from skilled intervention to address the above impairments. Continue treatment per established plan of care. Discharge Recommendations:  Inpatient Rehab  Further Equipment Recommendations for Discharge: To be determined at rehab, TLSO to be delivered to pt prior to discharge     SUBJECTIVE:   Patient stated I took Tylenol today.    Pt c/o increased back pain and moving slower. The patient stated 3/3 back precautions. Reviewed all 3 with patient.     OBJECTIVE DATA SUMMARY:   Critical Behavior:  Neurologic State: Alert  Orientation Level: Oriented X4  Cognition: Follows commands  Safety/Judgement: Awareness of environment, Fall prevention, Home safety  Functional Mobility Training:  Bed Mobility:  Log Rolling: Supervision;Assist x1, verbal cues given for correct technique   Supine to Sit: Minimum assistance  Scooting: Supervision        Transfers:  Sit to Stand: Minimum assistance  Stand to Sit: Contact guard assistance;Assist x1 (needs cues to reach back for chair when sitting)                             Balance:  Sitting: Intact  Standing: Impaired  Standing - Static: Good  Standing - Dynamic : Fair  Ambulation/Gait Training:  Distance (ft): 80 Feet (ft)  Assistive Device: Gait belt;Walker, rolling  Ambulation - Level of Assistance: Assist x1;Minimal assistance        Gait Abnormalities: Antalgic;Decreased step clearance;Trendelenburg;Trunk sway increased, stopping occasionally to rest              Speed/Maddi: Slow  Step Length: Right shortened;Left shortened             Pain:  Pain Scale 1: Numeric (0 - 10)  Pain Intensity 1: 8  Pain Location 1: Back  Pain Orientation 1: Mid  Pain Description 1: Aching  Pain Intervention(s) 1: Medication (see MAR)  Activity Tolerance:   Fair, c/o increased back pain, moving slower overall    After treatment:   [x]  Patient left in no apparent distress sitting up in chair  []  Patient left in no apparent distress in bed  [x]  Call bell left within reach  [x]  Nursing notified  []  Caregiver present  []  Bed alarm activated    COMMUNICATION/COLLABORATION:   The patients plan of care was discussed with: Registered Nurse    Julia Gipson   Time Calculation: 16 mins

## 2017-11-22 VITALS
TEMPERATURE: 98.4 F | HEIGHT: 66 IN | HEART RATE: 70 BPM | SYSTOLIC BLOOD PRESSURE: 142 MMHG | WEIGHT: 193.12 LBS | OXYGEN SATURATION: 97 % | DIASTOLIC BLOOD PRESSURE: 61 MMHG | RESPIRATION RATE: 16 BRPM | BODY MASS INDEX: 31.04 KG/M2

## 2017-11-22 LAB
GLUCOSE BLD STRIP.AUTO-MCNC: 109 MG/DL (ref 65–100)
SERVICE CMNT-IMP: ABNORMAL

## 2017-11-22 PROCEDURE — 82962 GLUCOSE BLOOD TEST: CPT

## 2017-11-22 PROCEDURE — 74011250637 HC RX REV CODE- 250/637: Performed by: PHYSICIAN ASSISTANT

## 2017-11-22 PROCEDURE — 74011250637 HC RX REV CODE- 250/637: Performed by: NURSE PRACTITIONER

## 2017-11-22 PROCEDURE — 97116 GAIT TRAINING THERAPY: CPT

## 2017-11-22 RX ORDER — OXYCODONE HYDROCHLORIDE 5 MG/1
5 TABLET ORAL
Status: DISCONTINUED | OUTPATIENT
Start: 2017-11-22 | End: 2017-11-22 | Stop reason: HOSPADM

## 2017-11-22 RX ORDER — ADHESIVE BANDAGE
30 BANDAGE TOPICAL DAILY
Status: DISCONTINUED | OUTPATIENT
Start: 2017-11-22 | End: 2017-11-22 | Stop reason: HOSPADM

## 2017-11-22 RX ADMIN — ACETAMINOPHEN 1000 MG: 500 TABLET, FILM COATED ORAL at 05:01

## 2017-11-22 RX ADMIN — POLYETHYLENE GLYCOL 3350 17 G: 17 POWDER, FOR SOLUTION ORAL at 09:59

## 2017-11-22 RX ADMIN — OXYCODONE HYDROCHLORIDE 5 MG: 5 TABLET ORAL at 10:09

## 2017-11-22 RX ADMIN — MEDROXYPROGESTERONE ACETATE 2.5 MG: 2.5 TABLET ORAL at 10:09

## 2017-11-22 RX ADMIN — DOCUSATE SODIUM AND SENNOSIDES 1 TABLET: 8.6; 5 TABLET, FILM COATED ORAL at 10:01

## 2017-11-22 RX ADMIN — LEVOTHYROXINE SODIUM 88 MCG: 88 TABLET ORAL at 07:00

## 2017-11-22 RX ADMIN — TRAMADOL HYDROCHLORIDE 50 MG: 50 TABLET, FILM COATED ORAL at 02:52

## 2017-11-22 RX ADMIN — ESTERIFIED ESTROGENS AND METHYLTESTOSTERONE 1 TABLET: .625; 1.25 TABLET ORAL at 10:01

## 2017-11-22 RX ADMIN — Medication 1 PACKET: at 11:22

## 2017-11-22 NOTE — PROGRESS NOTES
Problem: Falls - Risk of  Goal: *Absence of Falls  Document Cesar Fall Risk and appropriate interventions in the flowsheet. Outcome: Progressing Towards Goal  Fall Risk Interventions:  Mobility Interventions: Communicate number of staff needed for ambulation/transfer, PT Consult for mobility concerns, Patient to call before getting OOB, Utilize walker, cane, or other assitive device    Mentation Interventions: Family/sitter at bedside    Medication Interventions: Teach patient to arise slowly, Patient to call before getting OOB    Elimination Interventions:  Toilet paper/wipes in reach, Elevated toilet seat, Call light in reach    History of Falls Interventions: Door open when patient unattended, Evaluate medications/consider consulting pharmacy

## 2017-11-22 NOTE — ROUTINE PROCESS
Bedside and Verbal shift change report given to [facility] (oncoming nurse) by Bere Dickson (offgoing nurse). Report included the following information SBAR, Kardex, OR Summary, Intake/Output, MAR, Accordion and Recent Results.

## 2017-11-22 NOTE — PROGRESS NOTES
Problem: Mobility Impaired (Adult and Pediatric)  Goal: *Acute Goals and Plan of Care (Insert Text)  Physical Therapy Goals  Initiated 11/18/2017    1. Patient will move from supine to sit and sit to supine  in bed with modified independence within 4 days. 2. Patient will perform sit to stand with modified independence within 4 days. 3. Patient will ambulate with supervision/set-up for 350 feet with the least restrictive device within 4 days. 4. Patient will ascend/descend 12 stairs with 1 handrail(s) with minimal assistance/contact guard assist within 4 days. 5. Patient will verbalize and demonstrate understanding of spinal precautions (No bending, lifting greater than 5 lbs, or twisting; log-roll technique; frequent repositioning as instructed) within 4 days.    physical Therapy TREATMENT  Patient: Jaimie Butterfield (38 y.o. female)  Date: 11/22/2017  Diagnosis: ADULT SCOLIOSIS, SPINAL STENOSIS L4-5  Kyphoscoliosis <principal problem not specified>  Procedure(s) (LRB):  ANTERIOR SPINAL FUSION L3-S1, LUMBAR LAMINECTOMY L4-5, POSTERIOR SPINAL FUSION T4-S1 (N/A) 7 Days Post-Op  Precautions: Fall, Back,No bending, no lifting greater than 5 lbs, no twisting, log-roll technique, repositioning every 20-30 min except when sleeping, brace when OOB    ASSESSMENT:  Pt is making slow, steady progress,  reports improved pain control today though feels the medication is causing \"dizziness\", pt rated her pain 3/10 with ambulation, pt completes the log roll transfer with cueing for technique and to slow down, she requires use of the bed rail and 2 attempts with minimal assist to transfer to EOB, donned TLSO with total assist and instructed pt on donning brace, sit <> stand with CGA with cueing to reach back for chair to sit safely, ambulated x 80 feet with CGA, slowed gait with left trendelenburg and increased trunk sway, pt fatigues quickly with gait, remained up in chair following session, pt recalls 3 of 3 precautions and sitting restrictions, good candidate for inpatient rehab  Progression toward goals:  []      Improving appropriately and progressing toward goals  [x]      Improving slowly and progressing toward goals  []      Not making progress toward goals and plan of care will be adjusted     PLAN:  Patient continues to benefit from skilled intervention to address the above impairments. Continue treatment per established plan of care. Discharge Recommendations:  Inpatient Rehab  Further Equipment Recommendations for Discharge: To be determined at rehab     SUBJECTIVE:   Patient stated I am feeling dizzy but I am sure it's from the oxy.    The patient stated 3/3 back precautions. Reviewed all 3 with patient. OBJECTIVE DATA SUMMARY:   Critical Behavior:  Neurologic State: Alert  Orientation Level: Oriented X4  Cognition: Follows commands  Safety/Judgement: Awareness of environment, Fall prevention, Home safety  Functional Mobility Training:  Bed Mobility:  Pt continues to require cues to complete transfer with correct technique and cues to slow down  Log Rolling: Supervision;Assist x1, uses rail  Supine to Sit: Assist x1;Minimum assistance, uses rail, required 2 attempts     Scooting: Supervision        Brace donned with  total assistance, instructed pt how to ray TLSO  Transfers:  Sit to Stand: Contact guard assistance;Assist x1  Stand to Sit: Contact guard assistance;Assist x1 (needs reminder to reach back with both hands to sit safely)                             Balance:  Sitting: Intact  Standing: Impaired  Standing - Static: Good  Standing - Dynamic : Fair  Ambulation/Gait Training:  Distance (ft): 80 Feet (ft), Pt fatigues quickly and only tolerates a short distance   Assistive Device: Brace/Splint; Walker, rolling;Gait belt  Ambulation - Level of Assistance: Assist x1;Contact guard assistance        Gait Abnormalities: Antalgic;Trunk sway increased;Trendelenburg, left;Decreased step clearance Speed/Maddi: Slow  Step Length: Right shortened;Left shortened              Pain:  Pain Scale 1: Numeric (0 - 10)  Pain Intensity 1: 3  Pain Location 1: Abdomen;Back  Pain Orientation 1: Mid  Pain Description 1: Aching;Cramping;Constant  Pain Intervention(s) 1: Medication (see MAR); Repositioned  Activity Tolerance:   Fair, fatigues quickly, reports improved pain control though feels the medication is causing \"dizziness\"    After treatment:   [x]  Patient left in no apparent distress sitting up in chair  []  Patient left in no apparent distress in bed  [x]  Call bell left within reach  [x]  Nursing notified  []  Caregiver present  []  Bed alarm activated    COMMUNICATION/COLLABORATION:   The patients plan of care was discussed with: Registered Nurse    Julia Archibald   Time Calculation: 16 mins

## 2017-11-22 NOTE — PROGRESS NOTES
ORTHOPAEDIC LUMBAR FUSION PROGRESS NOTE    NAME: Walt Juan   :  1945   MRN:  [id number]   DATE:  2017    POD: 7 Days Post-Op  S/P: Procedure(s):  ANTERIOR SPINAL FUSION L3-S1, LUMBAR LAMINECTOMY L4-5, POSTERIOR SPINAL FUSION T4-S1    SUBJECTIVE:    Patient found sitting up in bed this morning, working with her nurse -states stomach feels better and has been up, working with PT/OT. Pt did get brace delivered yesterday but was unable to be transferred to [facility] rehab. Denies any new complaints. Afebrile/VSS. Denies nausea/vomiting, chest pain, headache or shortness of breath. Recent Labs      17   0509   HGB  8.5*   HCT  25.4*     Patient Vitals for the past 12 hrs:   BP Temp Pulse Resp SpO2   17 0458 152/74 97.3 °F (36.3 °C) 79 16 98 %   17 2047 156/73 98.8 °F (37.1 °C) 72 18 96 %     Diagnostic studies: KUB, 17 -A portable supine radiograph of the abdomen was obtained at 0812 hours and shows a nonspecific nonobstructive bowel gas pattern with gas in the stomach, small bowel and colon. No soft tissue masses or pathologic calcifications are  identified. There are rods in the thoracolumbar spine and sacrum and midline skin staples. There is lumbar laminectomy    EXAM:  Positive strength/ROM bilat lower ext.   Neuro intact  Dressings continues with slight serous/bloody drainage in the lower portion of the posterior incision    PLAN:  Continue PT/OT -OOB with TLSO  Continue pain management    Ok to d/c to [facility] today   -d/c order remains on chart      Rolf Duenas PA-C

## 2017-11-22 NOTE — PROGRESS NOTES
Hospital to [address]                                                                        81 y.o.   female    [address]   Room: [address] [place] [address]  Unit Phone# :  [phone]      [facility] [facility] [address]  Dept: [address]: [phone]                    SITUATION     Admitted:  11/15/2017         Attending Provider:  Juan Monroy MD       Consultations:  None    PCP:  Danitza Long MD   [phone]    Treatment Team: Attending Provider: Juan Monroy MD; Care Manager: Latoya Calderon; Nurse Practitioner: Eulis Eisenmenger, NP    Admitting Dx:  ADULT SCOLIOSIS, SPINAL STENOSIS L4-5  Kyphoscoliosis       Principal Problem: <principal problem not specified>    7 Days Post-Op of   Procedure(s):  ANTERIOR SPINAL FUSION L3-S1, LUMBAR LAMINECTOMY L4-5, POSTERIOR SPINAL FUSION T4-S1   BY: Juan Monroy MD             ON: 11/15/2017                  Code Status: Full Code                Advance Directives:   Advance Care Planning 11/15/2017   Patient's Healthcare Decision Maker is: Legal Next of Cathy 71   Primary Decision Maker Name Vandana Hernandez   Primary Decision Maker Phone Number [phone] H, [phone] c   Primary Decision Maker Relationship to Patient Spouse   Secondary Decision Maker Name Mango [place]   Secondary Decision Maker Phone Number [phone] C, [phone] H   Secondary Decision Maker Relationship to Patient Adult child   Confirm Advance Directive Yes, on file   Does the patient have other document types Organ Directive    (Send w/patient)   Verified       Isolation:  There are currently no Active Isolations       MDRO: No current active infections    Pain Medications given:  1015    Last dose: 11/22/2017 at  [address] needed: no  Type of equipment:         BACKGROUND     Allergies:   Allergies   Allergen Reactions    Bactrim [Sulfamethoprim] Swelling     \"LIP SWELLING\"       Past Medical History:   Diagnosis Date    Anxiety     Arrhythmia     A-FIB    Arthritis     BACK AND \"EVERYWHERE\"    Diabetes ([facility] [address]     TYPE II    GERD (gastroesophageal reflux disease)     Nausea & vomiting     Thyroid disease     HYPO       Past Surgical History:   Procedure Laterality Date    CARDIAC SURG PROCEDURE UNLIST  06/06/2017    A-FIB ABLATION    CARDIAC SURG PROCEDURE UNLIST  2014, 2016    CARDIOVERSION 3X FOR A-FIB    HX DILATION AND CURETTAGE  10/2009    HX GI  02/20/2017    ENDOSCOPY    HX GI      COLONOSCOPY    HX GYN  1980'S    LAP FOR ENDOMETRIOSIS    HX KNEE REPLACEMENT Right 11/08/2008    HX ORTHOPAEDIC Bilateral 2013, 2014    TRIGGER FINGER (RING FINGER)    HX ORTHOPAEDIC Bilateral 2005, 2015    BONE REMOVAL AND TENDON REPOSITIONING    HX ORTHOPAEDIC Right 08/05/2001    HAMMERTOE (4TH AND 5TH TOE)    HX ORTHOPAEDIC Right 03/2016    WRIST TENDON SNIPPING    HX TONSILLECTOMY  1953       Prescriptions Prior to Admission   Medication Sig    medroxyPROGESTERone (PROVERA) 2.5 mg tablet Take 2.5 mg by mouth daily.  estrogens, conjugated,-methylTESTOSTERone (COVARYX H.S.) 0.625-1.25 mg per tablet Take 1 Tab by mouth daily. Indications: VASOMOTOR SYMPTOMS ASSOCIATED WITH MENOPAUSE    levothyroxine (SYNTHROID) 88 mcg tablet Take 88 mcg by mouth Daily (before breakfast).  sodium chloride (AYR SALINE) 0.65 % nasal spray 1 Spray as needed for Congestion.  ketotifen (EYE ITCH RELIEF) 0.025 % (0.035 %) ophthalmic solution Administer 1 Drop to both eyes as needed.  desoximetasone (TOPICORT) 0.25 % topical cream Apply  to affected area as needed for Skin Irritation.  naphazoline-pheniramine (NAPHCON-A) 0.025-0.3 % ophthalmic solution Administer  to both eyes as needed. Hard scripts included in transfer packet N/A    Vaccinations: There is no immunization history on file for this patient.     Readmission Risks:    Known Risks: Falls The [employer] Index tool is an evidenced based tool that has more automatic generated information. The tool looks at many different items such as the age of the patient, how many times they were admitted in the last calendar year, current length of stay in the hospital and their diagnosis. All of these items are pulled automatically from information documented in the chart from various places and will generate a score that predicts whether a patient is at low (less than 13), medium (13-20) or high (21 or greater) risk of being readmitted. ASSESSMENT                Temp: 98.4 °F (36.9 °C) (11/22/17 0955) Pulse (Heart Rate): 70 (11/22/17 0955)     Resp Rate: 16 (11/22/17 0955)           BP: 142/61 (11/22/17 0955)     O2 Sat (%): 97 % (11/22/17 0955)     Weight: 87.6 kg (193 lb 2 oz)    Height: 5' 6\" (167.6 cm) (11/15/17 0709)       If above not within 1 hour of discharge:    BP:_____  P:____  R:____ T:_____ O2 Sat: ___%  O2: ______    Active Orders   Diet    DIET DIABETIC CONSISTENT CARB Regular         Orientation: oriented to time, place, person and situation     Active Behaviors:                                     Active Lines/Drains:  (Peg Tube / Cruz / CL or S/L?): no    Urinary Status: Voiding     Last BM: Last Bowel Movement Date: 11/21/17     Skin Integrity: Incision (comment) (surgical back and abdomen)   Wound Abdomen Mid-DRESSING STATUS: Changed per order, Breakthrough drainage  Wound Back-DRESSING STATUS: Changed per order    Wound Abdomen Mid-DRESSING TYPE: Dry dressing  Wound Back-DRESSING TYPE: ABD pad    Mobility: Slightly limited   Weight Bearing Status: WBAT (Weight Bearing as Tolerated)      Gait Training  Assistive Device: Brace/Splint, Walker, rolling, Gait belt  Ambulation - Level of Assistance: Assist x1, Contact guard assistance  Distance (ft): 80 Feet (ft)         Lab Results   Component Value Date/Time    Glucose 142 11/16/2017 02:37 AM    INR (POC) 1.0 10/27/2017 09:53 AM    HGB 8.5 11/21/2017 05:09 AM    HGB 8.4 11/20/2017 03:30 AM        RECOMMENDATION     See After Visit Summary (AVS) for:  · Discharge instructions  · After [address]   · Special equipment needed (entered pre-discharge by Care Management)  · Medication Reconciliation    · Follow up Appointment(s)         Report given/sent by:   Alphonse Sebastian                    Verbal report given to: Chaitanya Jorge  FAXED to:  [phone]         Estimated discharge time:  11/22/2017 at 680 [id number]

## 2017-11-22 NOTE — DISCHARGE SUMMARY
[address] [place] [address]   [address]  [phone]     Discharge Summary       PATIENT ID: Ann Hernandes  MRN: [id number]   YOB: 1945    DATE OF ADMISSION: 11/15/2017  5:49 AM    DATE OF DISCHARGE: 11/22/2017   PRIMARY CARE PROVIDER: Charlotte Worley MD     CONSULTATIONS: None    PROCEDURES/SURGERIES: Procedure(s):  ANTERIOR SPINAL FUSION L3-S1, LUMBAR LAMINECTOMY L4-5, POSTERIOR SPINAL FUSION T4-S1    History of Present Illness:  Ann Hernandes is a 81 y.o. female with severe degenerative change throughout her thoracolumbar spine resulting in degenerative kyphoscoliosis  After failing conservative therapy and a discussion of the risks, benefits, alternatives, perioperative course, and potential complications of surgery, she consented to undergo a Procedure(s):  ANTERIOR SPINAL FUSION L3-S1, LUMBAR LAMINECTOMY L4-5, POSTERIOR SPINAL FUSION T4-S1. Hospital Course:  Ann Hernandes tolerated the procedure well. She was transferred  to the recovery room in stable condition. After a brief stay the patient was then transferred to the Spinal Surgery Unit at [address] [place] [address].  An approximate 1-2 mm dural tear was encountered during the posterior portion of the surgery requiring intraoperative repair. She had an extremely friable dura and therefore was kept flat in bed for 48 hours postoperatively. On postoperative day #1, she was neurovascularly intact. The patient was afebrile and vital signs were stable. Calves were soft and non-tender bilaterally. On postoperative day  # 3, the patient began to mobilize with physical therapy. She was fitted with a custom TLSO brace on postoperative day #5. She was tolerating a regular diet and making slow progress with physical therapy. She was voiding and had a bowel movement following surgery and continued to pass flatus. She had no headaches.  Pain was controlled with PRN tramadol. Hemoglobin prior to discharge were   Lab Results   Component Value Date/Time    HGB 8.5 11/21/2017 05:09 AM        Caprice Miller made satisfactory progress with physical therapy and was discharged to [address] [place] [address] inpatient rehab in stable condition on postoperative day 7. She was provided with routine postoperative instructions and advised to follow up with Brooke Martinez MD in 2 weeks following discharge from the hospital.        FOLLOW UP APPOINTMENTS:    Follow-up Information     Follow up With Details Comments Contact Info    Alfredo Ahumada, MD On 12/11/2017 Hospital follow up PCP appointment on Monday, 12/11/17 @ 11:20 a.m.  [phone]  [address] Go on 11/21/2017 inpatient rehab.  [address] [address]  [phone]           ADDITIONAL CARE RECOMMENDATIONS:   Activities  1. You are going home a well person, be as active as possible. Your only exercise should be walking. Start with short frequent walks and increase your walking distance each day. Limit the amount of time you sit to 20-30 minute intervals. Sitting for prolonged periods of time will be uncomfortable for you following your surgery. 2. Do not lift anything over five pounds. 3. Do not do any straining, twisting or bending. 4. When you are in the bed, you may lay on your back or on either side. Do not lay on your stomach. Diet   You may resume your normal diet. If your throat is sore, you may want to eat soft foods for a few days. Be sure to drink plenty of fluids, it is important to keep yourself hydrated.  Avoid alcoholic beverages and tobacco products. Medications  1. Do not take anti-inflammatory medications or aspirin unless instructed by your physician. 2. Take your pain medication as directed. 3. It is important to have regular bowel movements. Pain medications may cause constipation. Stool softeners, prune juice, and increasing your water and fiber intake may help in preventing constipation. 4. Laxatives should only be used if the above preventative measures have failed and you still have not had a bowel movement after three days. Driving  You may not drive or return to work until instructed by your physician. However, you may ride in the car for short periods of time. Brace   If you have a back brace, you should wear your brace at all times when you are out of bed. Do not wear the brace while in bed or showering.  Remember to always wear a cotton t-shirt underneath your brace.  Do not bend or twist when your brace is off. Showering  1. You may shower with the Prineo dressing in place - it is designed to be watertight. 2. Leave the dressing on during your shower allowing the water to run over it. 3. Reminder- your brace can be removed while showering. Remember to not bend or twist while your brace is off.    4. Do not take a tub bath. Caring for your incision  1. You have a new dressing applied to your incision called a Prineo dressing, covered with gauze. You should leave this dressing in place until you are seen in the office in two weeks. 2. You may have steri strips on your incision (small, white pieces of tape). Do not pull the steri strips; they will fall off on their own after several days. If you have sutures or staples, they will be removed when you see your physician. 3. Do not rub or apply any lotions or ointments to your incision site. Stockings  You have been given T.E.D. stockings to wear. Continue to wear these for 7 days after your discharge. Put them on in the morning and take them off at night. (You may require some assistance with this task as you may be restricted in your bending.)  They are used to increase your circulation and prevent blood clots from forming in your legs.     Follow Up  Once you are home, call your physicians office to schedule an appointment for your two-week postoperative visit. Contact Dr. Vitaly Isaac at [phone], [phone]. Notify your physician if you develop any of the following conditions:  1. Fever above 101 degrees for 24 hours. 2. Nausea or vomiting. 3. Severe headache. 4. Inability to urinate. 5. Loss of bowel or bladder function. 6. Changes in sensation in your extremities (numbness, tingling, loss of color) that was not present before your surgery. 7. Severe pain or pain not relieved by medications. 8. Redness, swelling, or drainage from your incision. 9. Persistent pain in the chest or calf of either leg. 10. Increased weakness (if this is greater than before your surgery). [facility]-[phone] (after hours call [phone])  Dr. Deepti Armando:  Discharge Medication List as of 11/22/2017 12:00 PM      CONTINUE these medications which have NOT CHANGED    Details   medroxyPROGESTERone (PROVERA) 2.5 mg tablet Take 2.5 mg by mouth daily. , Historical Med      estrogens, conjugated,-methylTESTOSTERone (COVARYX H.S.) 0.625-1.25 mg per tablet Take 1 Tab by mouth daily. Indications: VASOMOTOR SYMPTOMS ASSOCIATED WITH MENOPAUSE, Historical Med      levothyroxine (SYNTHROID) 88 mcg tablet Take 88 mcg by mouth Daily (before breakfast). , Historical Med      sodium chloride (AYR SALINE) 0.65 % nasal spray 1 Spray as needed for Congestion. , Historical Med      ketotifen (EYE ITCH RELIEF) 0.025 % (0.035 %) ophthalmic solution Administer 1 Drop to both eyes as needed., Historical Med      desoximetasone (TOPICORT) 0.25 % topical cream Apply  to affected area as needed for Skin Irritation. , Historical Med      naphazoline-pheniramine (NAPHCON-A) 0.025-0.3 % ophthalmic solution Administer  to both eyes as needed., Historical Med         STOP taking these medications       fexofenadine-pseudoephedrine (ALLEGRA-D 24 HOUR) 180-240 mg per tablet Comments:   Reason for Stopping:         warfarin (COUMADIN) 5 mg tablet Comments:   Reason for Stopping:         metFORMIN (GLUMETZA ER) 500 mg TG24 24 hour tablet Comments:   Reason for Stopping:         fexofenadine-pseudoephedrine (ALLEGRA-D 24 HOUR) 180-240 mg per tablet Comments:   Reason for Stopping:         multivit-min-FA-lycopen-lutein (CENTRUM SILVER) 0.4-300-250 mg-mcg-mcg tab Comments:   Reason for Stopping:         cholecalciferol (VITAMIN D3) 1,000 unit tablet Comments:   Reason for Stopping:         OMEGA-3 FATTY ACIDS/FISH OIL (OMEGA 3 FISH OIL PO) Comments:   Reason for Stopping:         OAT BRAN PO Comments:   Reason for Stopping:         Wheat Dextrin (BENEFIBER CLEAR) 3 gram/3.5 gram pwpk Comments:   Reason for Stopping:         calcium carbonate-simethicone (ENRIKE-SELTZER HEARTBURN+GAS) 750-80 mg chew Comments:   Reason for Stopping:         L. RHAMNOSUS GG/INULIN (CULTURELLE PROBIOTICS PO) Comments:   Reason for Stopping:         naproxen sodium (ALEVE) 220 mg cap Comments:   Reason for Stopping:         raNITIdine hcl 150 mg capsule Comments:   Reason for Stopping:         triamcinolone acetonide (KENALOG) 0.1 % ointment Comments:   Reason for Stopping:         sodium chloride-aloe vera (AYR SALINE) topical gel Comments:   Reason for Stopping:         peg 400-propylene glycol (SYSTANE, PROPYLENE GLYCOL,) 0.4-0.3 % drop Comments:   Reason for Stopping:               PHYSICAL EXAMINATION AT DISCHARGE:  General: Pleasant, alert, cooperative, no distress. EENT: EOMI. Anicteric sclerae. Oral mucous moist, oropharynx benign. Resp: CTA bilaterally. No wheezing/rhonchi/rales. No accessory muscle use. CV: Regular rhythm, normal rate, no murmurs, gallops, rubs. No cyanosis or clubbing. No edema appreciated in the extremities. Gastrointestinal:  Soft, non-tender, non-distended. normoactive bowel sounds, no hepatosplenomegaly  Neurological: Follows commands. GREGORIO. Speech clear. Sensation intact to light touch.   Motor: unchanged C5-T1 and L2-S1. Musculoskeletal:  Calves soft, supple, non-tender upon palpation or with passive stretch. Psych: Good insight. Not anxious nor agitated. Skin: Good turgor. No rashes or lesions. Incision - clean, dry and intact. No significant erythema or swelling.       CHRONIC MEDICAL DIAGNOSES:  Problem List as of 11/22/2017  Date Reviewed: 11/16/2017          Codes Class Noted - Resolved    Kyphoscoliosis ICD-10-CM: M41.9  ICD-9-CM: 737.30  11/15/2017 - Present              Signed:   Clayton Davis NP  11/22/2017  2:37 PM

## 2017-11-22 NOTE — PROGRESS NOTES
Problem: Discharge Planning  Goal: *Discharge to safe environment  Outcome: Resolved/Met Date Met: 11/22/17  Pt has received brace. Discharging to [address] [place] [address] inpatient rehab today at 12PM via [facility] ambulance. Nurse to call report to [phone]. CM faxed discharge summary and med list to [phone]. CM has completed discharge folder which has been placed in bedside chart to include: MAR, kardex, discharge summary, PCS form, H&P, facesheet, and EMTALA. EMTALA to be signed and completed prior to transport. Accepting physician is . Pt will be admitted to room 501 @ [address] [place] [address]. Met with pt at the bedside to discuss discharge plan. Pt is agreeable with plan to inpatient rehab today. No barriers to discharge identified.     URI Mcfarlane

## 2019-02-01 NOTE — INTERDISCIPLINARY ROUNDS
"Gosper/mobility: Pt had wife come out to nurses station as asked if we could scoot him up in the chair. This RN and a tech at pts side and explained that we will help him stand up and reposition himself in the chair. \"I can't do it. Explained to the pt that he has to help reposition himself. He has to start doing his own activities so that he can be more independent when he gets discharged. He stated, \"You don't let me fall, then.\" Explained to him that he has to open his eyes and use his upper legs to help get himself to a standing position. Also went over proper use of splinting pillow. His wife stated, \"I'm glad you showed me. Now I can get him up.\" Explained to the wife that she can't get him up and down as he has all the tubes, wires and cables. It is a serious safety risk to her and to her  (the pt.). Educated on the proper use of the call light. It is laying right next to his hand. ------------------------DIANA Tomlin RN-------------------------------------------------------------  " IDR/SLIDR Summary          Patient: Deepak Smith MRN: [id number]    Age: 81 y.o. YOB: 1945 Room/Bed: [facility]   Admit Diagnosis: ADULT SCOLIOSIS, SPINAL STENOSIS L4-5  Kyphoscoliosis  Principal Diagnosis: <principal problem not specified>   Goals: Pain Management  Readmission: NO  Quality Measure: Not applicable  VTE Prophylaxis: Mechanical  Influenza Vaccine screening completed? YES  Pneumococcal Vaccine screening completed? YES  Mobility needs: Yes   Nutrition plan:Yes  Consults:P.T, O.T. and Case Management    Financial concerns:No  Escalated to CM? YES  RRAT Score: 7   Interventions:TBD  Testing due for pt today?  YES  LOS: 1 days Expected length of stay 2-3 days  Discharge plan: TBD   PCP: Salima Murphy MD  Transportation needs: Yes    Days before discharge:two or more days before discharge   Discharge disposition: TBD    Signed:     Riccardo Cruz  11/16/2017  1:06 AM

## 2020-06-29 LAB
CREATININE, EXTERNAL: 0.67
HBA1C MFR BLD HPLC: 5.5 %
LDL-C, EXTERNAL: 46

## 2020-07-22 ENCOUNTER — TELEPHONE (OUTPATIENT)
Dept: PRIMARY CARE CLINIC | Age: 75
End: 2020-07-22

## 2020-07-27 NOTE — TELEPHONE ENCOUNTER
Normal TSH, CBC, CMP except . LDL 46 at goal.  A1c 5.5. Negative ehrlichia and lyme titres.   Results are on the [facility] chart

## 2020-08-10 VITALS
HEIGHT: 66 IN | HEART RATE: 53 BPM | OXYGEN SATURATION: 97 % | WEIGHT: 175 LBS | BODY MASS INDEX: 28.12 KG/M2 | SYSTOLIC BLOOD PRESSURE: 130 MMHG | DIASTOLIC BLOOD PRESSURE: 70 MMHG

## 2020-08-10 PROBLEM — H61.20 IMPACTED CERUMEN: Status: ACTIVE | Noted: 2020-08-10

## 2020-08-11 ENCOUNTER — OFFICE VISIT (OUTPATIENT)
Dept: ENT CLINIC | Age: 75
End: 2020-08-11
Payer: MEDICARE

## 2020-08-11 VITALS
SYSTOLIC BLOOD PRESSURE: 110 MMHG | OXYGEN SATURATION: 98 % | BODY MASS INDEX: 28.38 KG/M2 | DIASTOLIC BLOOD PRESSURE: 64 MMHG | TEMPERATURE: 97.8 F | HEART RATE: 62 BPM | WEIGHT: 176.6 LBS | HEIGHT: 66 IN

## 2020-08-11 DIAGNOSIS — H61.23 BILATERAL IMPACTED CERUMEN: Primary | ICD-10-CM

## 2020-08-11 PROCEDURE — 99212 OFFICE O/P EST SF 10 MIN: CPT | Performed by: OTOLARYNGOLOGY

## 2020-08-11 NOTE — PROGRESS NOTES
Subjective:    Regulo Demarco   70 y.o.   1945      No complaints here for cerumen removal.        Review of Systems  Review of Systems   Constitutional: Negative. HENT: Negative. Eyes: Negative. Respiratory: Negative. Cardiovascular: Negative. Gastrointestinal: Negative. Musculoskeletal: Negative. Skin: Negative. Neurological: Negative. Endo/Heme/Allergies: Negative. Psychiatric/Behavioral: Negative. Objective:     Visit Vitals  /64 (BP 1 Location: Left arm, BP Patient Position: Sitting)   Pulse 62   Temp 97.8 °F (36.6 °C)   Ht 5' 6\" (1.676 m)   Wt 176 lb 9.6 oz (80.1 kg)   SpO2 98%   BMI 28.50 kg/m²      Physical Exam  Constitutional:       General: She is awake. Appearance: Normal appearance. She is well-developed. HENT:      Head: Normocephalic and atraumatic. Right Ear: Hearing, tympanic membrane, ear canal and external ear normal.      Left Ear: Hearing, tympanic membrane, ear canal and external ear normal.   Neurological:      Mental Status: She is alert and oriented to person, place, and time. Psychiatric:         Attention and Perception: Attention normal.         Mood and Affect: Mood normal.         Behavior: Behavior is cooperative. Assessment/Plan:     Encounter Diagnoses   Name Primary?  Bilateral impacted cerumen Yes   No cerumen removed from the EACs today. No orders of the defined types were placed in this encounter. Follow-up and Dispositions    · Return in about 6 months (around 2/11/2021) for ear cleaning.

## 2020-09-02 ENCOUNTER — TELEPHONE (OUTPATIENT)
Dept: PRIMARY CARE CLINIC | Age: 75
End: 2020-09-02

## 2020-09-02 DIAGNOSIS — M41.9 KYPHOSCOLIOSIS: Primary | ICD-10-CM

## 2020-09-02 DIAGNOSIS — F41.1 GAD (GENERALIZED ANXIETY DISORDER): ICD-10-CM

## 2020-09-03 NOTE — TELEPHONE ENCOUNTER
Look like she has an refill on the lorazepam that I will inform her about. She had an appointment in June; not sure if she forget to ask for it or she wasn't out of it yet.  But it was last prescribe by  in feb   Gabapentin 300mg Take 1 capsule daily   [facility] [address]

## 2020-09-08 PROBLEM — F41.1 GAD (GENERALIZED ANXIETY DISORDER): Status: ACTIVE | Noted: 2020-09-08

## 2020-09-08 RX ORDER — LORAZEPAM 1 MG/1
TABLET ORAL
Qty: 90 TAB | Refills: 0 | Status: SHIPPED | OUTPATIENT
Start: 2020-09-08 | End: 2021-03-16 | Stop reason: SDUPTHER

## 2020-09-08 RX ORDER — GABAPENTIN 300 MG/1
300 CAPSULE ORAL
Qty: 90 CAP | Refills: 0 | Status: SHIPPED | OUTPATIENT
Start: 2020-09-08 | End: 2020-11-05 | Stop reason: SDUPTHER

## 2020-09-08 RX ORDER — GABAPENTIN 300 MG/1
CAPSULE ORAL
COMMUNITY
Start: 2019-09-17 | End: 2020-09-08

## 2020-09-08 RX ORDER — LORAZEPAM 1 MG/1
TABLET ORAL
COMMUNITY
Start: 2019-11-08 | End: 2020-09-08 | Stop reason: SDUPTHER

## 2020-09-08 RX ORDER — GABAPENTIN 300 MG/1
300 CAPSULE ORAL
Qty: 90 CAP | Refills: 0 | Status: SHIPPED | OUTPATIENT
Start: 2020-09-08 | End: 2020-09-08

## 2020-09-08 NOTE — TELEPHONE ENCOUNTER
Pt states insurance does not like [facility] and needs to go to [facility] [address] both gabapentin and lorazepam.

## 2020-09-08 NOTE — TELEPHONE ENCOUNTER
Last PDMP Ganga Decker as Reviewed:  Review User Review Instant Review Result   DELL JETT 9/8/2020  5:14 PM Reviewed PDMP [1]    both done #90 / nr .  To [facility]

## 2020-11-05 ENCOUNTER — VIRTUAL VISIT (OUTPATIENT)
Dept: PRIMARY CARE CLINIC | Age: 75
End: 2020-11-05
Payer: MEDICARE

## 2020-11-05 DIAGNOSIS — G57.92 NEUROPATHY OF LEFT LOWER EXTREMITY: ICD-10-CM

## 2020-11-05 DIAGNOSIS — S96.911A TEAR OF TENDON OF RIGHT ANKLE, INITIAL ENCOUNTER: Primary | ICD-10-CM

## 2020-11-05 DIAGNOSIS — E11.9 WELL CONTROLLED TYPE 2 DIABETES MELLITUS (HCC): ICD-10-CM

## 2020-11-05 DIAGNOSIS — E03.9 ACQUIRED HYPOTHYROIDISM: ICD-10-CM

## 2020-11-05 DIAGNOSIS — F51.01 PRIMARY INSOMNIA: ICD-10-CM

## 2020-11-05 PROCEDURE — 99214 OFFICE O/P EST MOD 30 MIN: CPT | Performed by: NURSE PRACTITIONER

## 2020-11-05 RX ORDER — WARFARIN 2.5 MG/1
2.5 TABLET ORAL DAILY
COMMUNITY
End: 2021-05-18

## 2020-11-05 RX ORDER — VITAMIN B COMPLEX
500 CAPSULE ORAL DAILY
COMMUNITY
End: 2022-05-05 | Stop reason: ALTCHOICE

## 2020-11-05 RX ORDER — METFORMIN HYDROCHLORIDE 500 MG/1
500 TABLET, EXTENDED RELEASE ORAL 2 TIMES DAILY
Qty: 180 TAB | Refills: 0 | Status: SHIPPED | OUTPATIENT
Start: 2020-11-05 | End: 2021-03-10 | Stop reason: SDUPTHER

## 2020-11-05 RX ORDER — MELATONIN
2000 DAILY
COMMUNITY

## 2020-11-05 RX ORDER — WARFARIN SODIUM 5 MG/1
5 TABLET ORAL DAILY
COMMUNITY
Start: 2020-08-24

## 2020-11-05 RX ORDER — LETROZOLE 2.5 MG/1
2.5 TABLET, FILM COATED ORAL DAILY
COMMUNITY
Start: 2019-02-21

## 2020-11-05 RX ORDER — TRAZODONE HYDROCHLORIDE 50 MG/1
50 TABLET ORAL AS NEEDED
COMMUNITY
End: 2021-05-18

## 2020-11-05 RX ORDER — BLOOD SUGAR DIAGNOSTIC
STRIP MISCELLANEOUS
COMMUNITY
End: 2021-03-16 | Stop reason: SDUPTHER

## 2020-11-05 RX ORDER — GABAPENTIN 300 MG/1
300 CAPSULE ORAL
Qty: 90 CAP | Refills: 0 | Status: SHIPPED | OUTPATIENT
Start: 2020-12-01 | End: 2021-03-10 | Stop reason: SDUPTHER

## 2020-11-05 RX ORDER — METFORMIN HYDROCHLORIDE 500 MG/1
500 TABLET, EXTENDED RELEASE ORAL 2 TIMES DAILY
COMMUNITY
Start: 2020-09-13 | End: 2020-11-05 | Stop reason: SDUPTHER

## 2020-11-05 RX ORDER — LEVOTHYROXINE SODIUM 88 UG/1
88 TABLET ORAL
Qty: 90 TAB | Refills: 0 | Status: SHIPPED | OUTPATIENT
Start: 2020-11-05 | End: 2021-05-18 | Stop reason: SDUPTHER

## 2020-11-05 RX ORDER — DICLOFENAC SODIUM 10 MG/G
GEL TOPICAL
COMMUNITY
Start: 2019-07-29 | End: 2021-05-18

## 2020-11-05 RX ORDER — ACETAMINOPHEN 500 MG
TABLET ORAL
COMMUNITY

## 2020-11-05 RX ORDER — FERROUS SULFATE, DRIED 160(50) MG
1 TABLET, EXTENDED RELEASE ORAL DAILY
COMMUNITY
End: 2022-05-05 | Stop reason: ALTCHOICE

## 2020-11-05 RX ORDER — MINERAL OIL
180 ENEMA (ML) RECTAL DAILY
COMMUNITY

## 2020-11-05 NOTE — PROGRESS NOTES
HISTORY OF PRESENT ILLNESS  Rosario Alvarez is a 70 y.o. female presents via telemedicine for ankle isses, sleep issues and medication refills. *New patient to this provider*    1. Ankle: Patient reported that she has been having ankle pain for a few months. Patient reported she had an MRI that showed a near complete tear of the posterior tibial tendon. Patient is following with Dr. Queen Fonseca at the foot clinic who is giving her injections in her foot. He recommend she start PT which she is going to start soon. She is also using tylenol and joint flex. Patient also uses gabapentin to help with this. 2. Insomnia:  Patient has been using lorazepam and trazadone for sleep. Notes she does not like to take these but notes she will not sleep without them. 3. Diabetes: Well controlled on metformin. Patient's last a1c was 5.5%    4. Hypothyroidism:  Patient reported that her thyroid levels have been well controlled on synthroid. Patient denies any concerning symptoms. There were no vitals filed for this visit. Patient Active Problem List   Diagnosis Code    Kyphoscoliosis M41.9    Impacted cerumen H61.20    JESSE (generalized anxiety disorder) F41.1    Neuropathy of left lower extremity G57.92     Patient Active Problem List    Diagnosis Date Noted    JESSE (generalized anxiety disorder) 09/08/2020    Impacted cerumen 08/10/2020    Neuropathy of left lower extremity 02/26/2019    Kyphoscoliosis 11/15/2017     Current Outpatient Medications   Medication Sig Dispense Refill    glucose blood VI test strips (Accu-Chek Laura Plus test strp) strip Accu-Chek Laura Plus test strips   TEST BS ONCE D      acetaminophen (Tylenol Extra Strength) 500 mg tablet Tylenol Extra Strength 500 mg tablet   Take 2 tablets every 4 hours by oral route.  calcium-vitamin D (OS-KARLA +D3) 500 mg-200 unit per tablet Take 1 Tab by mouth daily.       cholecalciferol (VITAMIN D3) (1000 Units /25 mcg) tablet Take  by mouth daily.  diclofenac (Voltaren) 1 % gel Voltaren 1 % topical gel      fexofenadine (ALLEGRA) 180 mg tablet Take 180 mg by mouth daily.  letrozole (FEMARA) 2.5 mg tablet Take 2.5 mg by mouth daily.  linaCLOtide (Linzess) 290 mcg cap capsule Take 290 mg by mouth daily.  traZODone (DESYREL) 50 mg tablet Take 50 mg by mouth as needed.  warfarin (COUMADIN) 5 mg tablet Take 5 mg by mouth daily.  vitamin B complex capsule Take 500 mcg by mouth daily.  omega-3-dha-epa-dpa-fish oil 1,050-1,200 mg cap Omega 3      cyanocobalamin, vitamin B-12, (VITAMIN B12 PO) Vitamin C-83      folic acid/multivit-min/lutein (CENTRUM SILVER PO) Centrum Silver      WHEAT DEXTRIN PO Take 1 Packet by mouth daily.  cholecalciferol, vitamin D3, (VITAMIN D3 PO) Vitamin D3      warfarin (COUMADIN) 2.5 mg tablet Take 2.5 mg by mouth daily. Only on Tuesday and Thursday      [START ON 12/1/2020] gabapentin (NEURONTIN) 300 mg capsule Take 1 Cap by mouth nightly. Max Daily Amount: 300 mg. Indications: neuropathic pain 90 Cap 0    metFORMIN ER (GLUCOPHAGE XR) 500 mg tablet Take 1 Tab by mouth two (2) times a day. 180 Tab 0    levothyroxine (Synthroid) 88 mcg tablet Take 1 Tab by mouth Daily (before breakfast). 90 Tab 0    LORazepam (ATIVAN) 1 mg tablet lorazepam 1 mg tablet  TK 1 T PO TID  Indications: anxious 90 Tab 0    sodium chloride (AYR SALINE) 0.65 % nasal spray 1 Spray as needed for Congestion.  naphazoline-pheniramine (NAPHCON-A) 0.025-0.3 % ophthalmic solution Administer  to both eyes as needed.        Allergies   Allergen Reactions    Bactrim [Sulfamethoprim] Swelling     \"LIP SWELLING\"     Past Medical History:   Diagnosis Date    Allergies     Anxiety     Arrhythmia     A-FIB    Arthritis     BACK AND \"EVERYWHERE\"    Diabetes ([address]     TYPE II    GERD (gastroesophageal reflux disease)     Impacted cerumen 8/10/2020    Nausea & vomiting     Sleep disorder     Thyroid disease HYPO     Past Surgical History:   Procedure Laterality Date    CARDIAC SURG PROCEDURE UNLIST  06/06/2017    A-FIB ABLATION    CARDIAC SURG PROCEDURE UNLIST  2014, 2016    CARDIOVERSION 3X FOR A-FIB    HX DILATION AND CURETTAGE  10/2009    HX GI  02/20/2017    ENDOSCOPY    HX GI      COLONOSCOPY    HX GYN  1980'S    LAP FOR ENDOMETRIOSIS    HX KNEE REPLACEMENT Right 11/08/2008    HX ORTHOPAEDIC Bilateral 2013, 2014    TRIGGER FINGER (RING FINGER)    HX ORTHOPAEDIC Bilateral 2005, 2015    BONE REMOVAL AND TENDON REPOSITIONING    HX ORTHOPAEDIC Right 08/05/2001    HAMMERTOE (4TH AND 5TH TOE)    HX ORTHOPAEDIC Right 03/2016    WRIST TENDON SNIPPING    HX TONSILLECTOMY  1953     Family History   Problem Relation Age of Onset    Heart Disease Mother     Hypertension Father      Social History     Tobacco Use    Smoking status: Never Smoker    Smokeless tobacco: Never Used   Substance Use Topics    Alcohol use: Yes     Alcohol/week: 4.0 standard drinks     Types: 1 Glasses of wine, 2 Cans of beer, 1 Shots of liquor per week           Review of Systems   Constitutional: Negative for malaise/fatigue and weight loss. Eyes: Negative for blurred vision and double vision. Respiratory: Negative for shortness of breath. Cardiovascular: Negative for chest pain and palpitations. Gastrointestinal: Positive for constipation. Negative for nausea and vomiting. Musculoskeletal: Positive for joint pain (right ankle). Neurological: Negative for dizziness and headaches. Psychiatric/Behavioral: The patient has insomnia. Physical Exam  Constitutional:       Appearance: Normal appearance. HENT:      Head: Normocephalic. Eyes:      Conjunctiva/sclera: Conjunctivae normal.      Pupils: Pupils are equal, round, and reactive to light. Pulmonary:      Effort: Pulmonary effort is normal.   Skin:     General: Skin is warm and dry. Neurological:      Mental Status: She is alert.    Psychiatric: Mood and Affect: Mood normal.         Behavior: Behavior normal.           ASSESSMENT and PLAN  Diagnoses and all orders for this visit:    1. Tear of tendon of right ankle, initial encounter  Comments: Following with ortho for this. Going to start PT soon. 2. Neuropathy of left lower extremity  Assessment & Plan:  Continue on gabapentin. Orders:  -     gabapentin (NEURONTIN) 300 mg capsule; Take 1 Cap by mouth nightly. Max Daily Amount: 300 mg. Indications: neuropathic pain    3. Well controlled type 2 diabetes mellitus ([address]  Comments:  Continue on metformin. Last A1c well controlled. Orders:  -     metFORMIN ER (GLUCOPHAGE XR) 500 mg tablet; Take 1 Tab by mouth two (2) times a day. 4. Primary insomnia    5. Acquired hypothyroidism  Comments:  Last TSH normal.   Continue synthyroid. Orders:  -     levothyroxine (Synthroid) 88 mcg tablet; Take 1 Tab by mouth Daily (before breakfast). Last PDMP Jerica Bradford as Reviewed:  Review User Review Instant Review Result   Donta Levine 11/5/2020  1:56 PM Reviewed PDMP [1]     Adeolachelsea العراقي, who was evaluated through a synchronous (real-time) audio-video encounter, and/or her healthcare decision maker, is aware that it is a billable service, with coverage as determined by her insurance carrier. She provided verbal consent to proceed: Yes, and patient identification was verified. It was conducted pursuant to the emergency declaration under the [address], [address] [place] [address] authority and the [employer] Act. A caregiver was present when appropriate. Ability to conduct physical exam was limited. I was at home. The patient was at home.         Shayna Galeano NP

## 2021-03-05 DIAGNOSIS — E11.9 WELL CONTROLLED TYPE 2 DIABETES MELLITUS (HCC): ICD-10-CM

## 2021-03-05 DIAGNOSIS — G57.92 NEUROPATHY OF LEFT LOWER EXTREMITY: ICD-10-CM

## 2021-03-05 RX ORDER — GABAPENTIN 300 MG/1
CAPSULE ORAL
Qty: 90 CAP | Refills: 0 | OUTPATIENT
Start: 2021-03-05

## 2021-03-05 RX ORDER — METFORMIN HYDROCHLORIDE 500 MG/1
TABLET, EXTENDED RELEASE ORAL
Qty: 180 TAB | Refills: 0 | OUTPATIENT
Start: 2021-03-05

## 2021-03-10 RX ORDER — GABAPENTIN 300 MG/1
300 CAPSULE ORAL
Qty: 90 CAP | Refills: 0 | Status: SHIPPED | OUTPATIENT
Start: 2021-03-10 | End: 2021-07-08

## 2021-03-10 RX ORDER — METFORMIN HYDROCHLORIDE 500 MG/1
500 TABLET, EXTENDED RELEASE ORAL 2 TIMES DAILY
Qty: 180 TAB | Refills: 0 | Status: SHIPPED | OUTPATIENT
Start: 2021-03-10 | End: 2021-05-18 | Stop reason: SDUPTHER

## 2021-03-16 DIAGNOSIS — E11.9 WELL CONTROLLED TYPE 2 DIABETES MELLITUS (HCC): Primary | ICD-10-CM

## 2021-03-16 DIAGNOSIS — F41.1 GAD (GENERALIZED ANXIETY DISORDER): ICD-10-CM

## 2021-03-16 RX ORDER — LANCETS
EACH MISCELLANEOUS
Qty: 100 EACH | Refills: 3 | Status: SHIPPED | OUTPATIENT
Start: 2021-03-16

## 2021-03-16 RX ORDER — BLOOD SUGAR DIAGNOSTIC
STRIP MISCELLANEOUS
Qty: 100 STRIP | Refills: 3 | Status: SHIPPED | OUTPATIENT
Start: 2021-03-16

## 2021-03-16 RX ORDER — LORAZEPAM 1 MG/1
TABLET ORAL
Qty: 90 TAB | Refills: 0 | Status: SHIPPED | OUTPATIENT
Start: 2021-03-16 | End: 2021-05-18 | Stop reason: SDUPTHER

## 2021-03-16 NOTE — PROGRESS NOTES
Last PDMP Rolf Covarrubias as Reviewed:  Review User Review Instant Review Result   Lela Hammerkailynard 3/16/2021  9:08 AM Reviewed PDMP [1]

## 2021-05-04 ENCOUNTER — TELEPHONE (OUTPATIENT)
Dept: PRIMARY CARE CLINIC | Age: 76
End: 2021-05-04

## 2021-05-04 NOTE — TELEPHONE ENCOUNTER
[address] is asking for last office notes for patient, please fax to [phone]

## 2021-05-18 ENCOUNTER — OFFICE VISIT (OUTPATIENT)
Dept: PRIMARY CARE CLINIC | Age: 76
End: 2021-05-18
Payer: MEDICARE

## 2021-05-18 VITALS
TEMPERATURE: 97.5 F | DIASTOLIC BLOOD PRESSURE: 67 MMHG | OXYGEN SATURATION: 99 % | HEIGHT: 66 IN | SYSTOLIC BLOOD PRESSURE: 123 MMHG | WEIGHT: 174 LBS | HEART RATE: 59 BPM | BODY MASS INDEX: 27.97 KG/M2 | RESPIRATION RATE: 18 BRPM

## 2021-05-18 DIAGNOSIS — E11.9 WELL CONTROLLED TYPE 2 DIABETES MELLITUS (HCC): ICD-10-CM

## 2021-05-18 DIAGNOSIS — S96.911A TEAR OF TENDON OF RIGHT ANKLE, INITIAL ENCOUNTER: ICD-10-CM

## 2021-05-18 DIAGNOSIS — G57.92 NEUROPATHY OF LEFT LOWER EXTREMITY: Primary | ICD-10-CM

## 2021-05-18 DIAGNOSIS — C50.911 BREAST CANCER, STAGE 2, RIGHT (HCC): ICD-10-CM

## 2021-05-18 DIAGNOSIS — Z13.31 POSITIVE DEPRESSION SCREENING: ICD-10-CM

## 2021-05-18 DIAGNOSIS — E03.9 ACQUIRED HYPOTHYROIDISM: ICD-10-CM

## 2021-05-18 DIAGNOSIS — F51.01 PRIMARY INSOMNIA: ICD-10-CM

## 2021-05-18 PROBLEM — M65.9 SYNOVITIS OF RIGHT ANKLE: Status: ACTIVE | Noted: 2017-09-26

## 2021-05-18 PROCEDURE — 2022F DILAT RTA XM EVC RTNOPTHY: CPT | Performed by: NURSE PRACTITIONER

## 2021-05-18 PROCEDURE — G8427 DOCREV CUR MEDS BY ELIG CLIN: HCPCS | Performed by: NURSE PRACTITIONER

## 2021-05-18 PROCEDURE — 1101F PT FALLS ASSESS-DOCD LE1/YR: CPT | Performed by: NURSE PRACTITIONER

## 2021-05-18 PROCEDURE — 1090F PRES/ABSN URINE INCON ASSESS: CPT | Performed by: NURSE PRACTITIONER

## 2021-05-18 PROCEDURE — 3046F HEMOGLOBIN A1C LEVEL >9.0%: CPT | Performed by: NURSE PRACTITIONER

## 2021-05-18 PROCEDURE — G8432 DEP SCR NOT DOC, RNG: HCPCS | Performed by: NURSE PRACTITIONER

## 2021-05-18 PROCEDURE — G8399 PT W/DXA RESULTS DOCUMENT: HCPCS | Performed by: NURSE PRACTITIONER

## 2021-05-18 PROCEDURE — 3017F COLORECTAL CA SCREEN DOC REV: CPT | Performed by: NURSE PRACTITIONER

## 2021-05-18 PROCEDURE — G8419 CALC BMI OUT NRM PARAM NOF/U: HCPCS | Performed by: NURSE PRACTITIONER

## 2021-05-18 PROCEDURE — G8536 NO DOC ELDER MAL SCRN: HCPCS | Performed by: NURSE PRACTITIONER

## 2021-05-18 PROCEDURE — 99214 OFFICE O/P EST MOD 30 MIN: CPT | Performed by: NURSE PRACTITIONER

## 2021-05-18 RX ORDER — METOPROLOL SUCCINATE 25 MG/1
1 TABLET, EXTENDED RELEASE ORAL DAILY
COMMUNITY
Start: 2021-02-16 | End: 2022-01-04 | Stop reason: SDUPTHER

## 2021-05-18 RX ORDER — CLOBETASOL PROPIONATE 0.5 MG/G
CREAM TOPICAL
COMMUNITY
Start: 2021-05-14 | End: 2021-12-01 | Stop reason: ALTCHOICE

## 2021-05-18 RX ORDER — LORAZEPAM 1 MG/1
1 TABLET ORAL
Qty: 90 TAB | Refills: 1 | Status: SHIPPED | OUTPATIENT
Start: 2021-05-18 | End: 2021-12-01 | Stop reason: SDUPTHER

## 2021-05-18 RX ORDER — LEVOTHYROXINE SODIUM 88 UG/1
88 TABLET ORAL
Qty: 90 TAB | Refills: 0 | Status: SHIPPED | OUTPATIENT
Start: 2021-05-18 | End: 2021-05-24 | Stop reason: SDUPTHER

## 2021-05-18 RX ORDER — METFORMIN HYDROCHLORIDE 500 MG/1
500 TABLET, EXTENDED RELEASE ORAL 2 TIMES DAILY
Qty: 180 TAB | Refills: 0 | Status: SHIPPED | OUTPATIENT
Start: 2021-05-18 | End: 2021-08-05 | Stop reason: SDUPTHER

## 2021-05-18 NOTE — PROGRESS NOTES
Chief Complaint   Patient presents with    Follow Up Chronic Condition     pt states needing refill on metformin, lorazepam, gabapentin, levothyroxine      Visit Vitals  /67 (BP 1 Location: Right arm, BP Patient Position: At rest, BP Cuff Size: Adult)   Pulse (!) 59   Temp 97.5 °F (36.4 °C) (Temporal)   Resp 18   Ht 5' 6\" (1.676 m)   Wt 174 lb (78.9 kg)   SpO2 99%   BMI 28.08 kg/m²     1. Have you been to the ER, urgent care clinic since your last visit? Hospitalized since your last visit? No    2. Have you seen or consulted any other health care providers outside of the [address] [place] [address] since your last visit? Include any pap smears or colon screening.  No

## 2021-05-18 NOTE — PROGRESS NOTES
HISTORY OF PRESENT ILLNESS  Cailin Doe is a 78 y.o. female presents for medication refill. 1. Ankle: Patient reported that she has been having ankle pain for a few months. Patient reported she had an MRI that showed a near complete tear of the posterior tibial tendon. Patient is following with Dr. Roz Harrison at the foot clinic who is giving her injections in her foot. Has completed physical therapy but it caused worsening pain to ankle. She is not using a boot to help immbolize. She is also using tylenol and joint flex. Patient also uses gabapentin to help with this. Has been using a cane to get around, has had two falls, tripped over furniture. Next appointment with Dr. Roz Harrison is on May 28th.      2. Insomnia:  Patient has been using lorazepam every night for sleep. Notes she does not like to take these but notes she can't sleep without it. Tried to wean off of lorazepam by breaking in half previously but got hot flashes and couldn't stand it so went back to full tablet.      3. Diabetes: Well controlled on metformin. Patient's last a1c was 5.5%     4. Hypothyroidism:  Patient reported that her thyroid levels have been well controlled on synthroid. Patient denies any concerning symptoms. Freddy Doll: Prescribes letrozole.    Dr. Luanne Galvan, last mammo Sept 2020, normal.   Vitals:    05/18/21 1000   BP: 123/67   Pulse: (!) 59   Resp: 18   Temp: 97.5 °F (36.4 °C)   TempSrc: Temporal   SpO2: 99%   Weight: 174 lb (78.9 kg)   Height: 5' 6\" (1.676 m)     Patient Active Problem List   Diagnosis Code    Kyphoscoliosis M41.9    Impacted cerumen H61.20    JESSE (generalized anxiety disorder) F41.1    Neuropathy of left lower extremity G57.92    Diverticulosis K57.90    Skin tags, anus or rectum K64.4    Internal hemorrhoids K64.8    Synovitis of right ankle M65.9    Breast cancer, stage 2, right (HCC) C50.911    Primary insomnia F51.01     Patient Active Problem List    Diagnosis Date Noted    Breast cancer, stage 2, right ([facility] [address] 05/18/2021    Primary insomnia 05/18/2021    Diverticulosis     Skin tags, anus or rectum     Internal hemorrhoids     JESSE (generalized anxiety disorder) 09/08/2020    Impacted cerumen 08/10/2020    Neuropathy of left lower extremity 02/26/2019    Kyphoscoliosis 11/15/2017    Synovitis of right ankle 09/26/2017     Current Outpatient Medications   Medication Sig Dispense Refill    metoprolol succinate (TOPROL-XL) 25 mg XL tablet Take 1 Tab by mouth daily.  clobetasoL (TEMOVATE) 0.05 % topical cream       LORazepam (ATIVAN) 1 mg tablet Take 1 Tab by mouth nightly as needed (sleep). Max Daily Amount: 1 mg. Indications: difficulty sleeping 90 Tab 1    metFORMIN ER (GLUCOPHAGE XR) 500 mg tablet Take 1 Tab by mouth two (2) times a day. 180 Tab 0    levothyroxine (Synthroid) 88 mcg tablet Take 1 Tab by mouth Daily (before breakfast). 90 Tab 0    lancets misc Use as directed to check BG. DX: E11.49 100 Each 3    glucose blood VI test strips (Accu-Chek Laura Plus test strp) strip Use as directed to check BG Daily. DX: E11.49 100 Strip 3    gabapentin (NEURONTIN) 300 mg capsule Take 1 Cap by mouth nightly. Max Daily Amount: 300 mg. Indications: neuropathic pain 90 Cap 0    acetaminophen (Tylenol Extra Strength) 500 mg tablet Tylenol Extra Strength 500 mg tablet   Take 2 tablets every 4 hours by oral route.  calcium-vitamin D (OS-KARLA +D3) 500 mg-200 unit per tablet Take 1 Tab by mouth daily.  cholecalciferol (VITAMIN D3) (1000 Units /25 mcg) tablet Take 2,000 Units by mouth daily.  fexofenadine (ALLEGRA) 180 mg tablet Take 180 mg by mouth daily.  letrozole (FEMARA) 2.5 mg tablet Take 2.5 mg by mouth daily.  linaCLOtide (Linzess) 290 mcg cap capsule Take 290 mg by mouth daily.  warfarin (COUMADIN) 5 mg tablet Take 5 mg by mouth daily.  vitamin B complex capsule Take 500 mcg by mouth daily.       folic acid/multivit-min/lutein (CENTRUM SILVER PO) Centrum Silver      omega-3-dha-epa-dpa-fish oil 1,050-1,200 mg cap Omega 3       Allergies   Allergen Reactions    Bactrim [Sulfamethoprim] Swelling     \"LIP SWELLING\"     Past Medical History:   Diagnosis Date    Allergies     Anxiety     Arrhythmia     A-FIB    Arthritis     BACK AND \"EVERYWHERE\"    Diabetes ([facility] [address]     TYPE II    Diverticulosis     GERD (gastroesophageal reflux disease)     Impacted cerumen 8/10/2020    Internal hemorrhoids     Nausea & vomiting     Skin tags, anus or rectum     found during colonscopy     Sleep disorder     Thyroid disease     HYPO     Past Surgical History:   Procedure Laterality Date    HX BREAST LUMPECTOMY Right 11/30/2018    with removal of 5 lymph nodes    HX COLONOSCOPY  09/21/2018    HX DILATION AND CURETTAGE  10/2009    HX ENDOSCOPY  02/20/2017    normal     HX GI  02/20/2017    ENDOSCOPY    HX GI      COLONOSCOPY    HX GYN  1980'S    LAP FOR ENDOMETRIOSIS    HX KNEE REPLACEMENT Right 11/08/2008    HX LUMBAR LAMINECTOMY  2017    frontal/posterior laminectomy with fusion.  HX ORTHOPAEDIC Bilateral 2013, 2014    TRIGGER FINGER (RING FINGER)    HX ORTHOPAEDIC Bilateral 2005, 2015    BONE REMOVAL AND TENDON REPOSITIONING    HX ORTHOPAEDIC Right 08/05/2001    HAMMERTOE (4TH AND 5TH TOE)    HX ORTHOPAEDIC Right 03/2016    WRIST TENDON SNIPPING    HX TONSILLECTOMY  1953    AK CARDIAC SURG PROCEDURE UNLIST  06/06/2017    A-FIB ABLATION    [place] CARDIAC SURG PROCEDURE UNLIST  2014, 2016    CARDIOVERSION 3X FOR A-FIB     Family History   Problem Relation Age of Onset    Heart Disease Mother     Hypertension Father      Social History     Tobacco Use    Smoking status: Never Smoker    Smokeless tobacco: Never Used   Substance Use Topics    Alcohol use: Not Currently           Review of Systems   Constitutional: Negative for malaise/fatigue and weight loss. Eyes: Negative for blurred vision and double vision.    Respiratory: Negative for shortness of breath. Cardiovascular: Positive for leg swelling. Negative for chest pain and palpitations. Gastrointestinal: Negative. Musculoskeletal: Positive for joint pain. Negative for myalgias. Neurological: Positive for tingling. Negative for dizziness and headaches. Psychiatric/Behavioral: The patient has insomnia. The patient is not nervous/anxious. Physical Exam  Constitutional:       Appearance: Normal appearance. HENT:      Head: Normocephalic. Eyes:      Conjunctiva/sclera: Conjunctivae normal.      Pupils: Pupils are equal, round, and reactive to light. Cardiovascular:      Rate and Rhythm: Normal rate and regular rhythm. Pulses: Normal pulses. Heart sounds: Normal heart sounds. Pulmonary:      Effort: Pulmonary effort is normal.   Musculoskeletal:      Right ankle: She exhibits swelling. Tenderness. Skin:     General: Skin is warm and dry. Capillary Refill: Capillary refill takes less than 2 seconds. Neurological:      Mental Status: She is alert and oriented to person, place, and time. Psychiatric:         Mood and Affect: Mood normal.         Behavior: Behavior normal.           ASSESSMENT and PLAN  Diagnoses and all orders for this visit:    1. Neuropathy of left lower extremity    2. Breast cancer, stage 2, right [facility])  Assessment & Plan:  Managed by oncology, Dr. John Moreno. Had radiation in 2018  On letrozole for 5 years, started in 2019. Most recent mammo Sept 2020 was normal.       3. Well controlled type 2 diabetes mellitus ([facility] [address]  Comments:  stable on medication. labs ordered today. Orders:  -     CBC WITH AUTOMATED DIFF  -     METABOLIC PANEL, COMPREHENSIVE  -     LIPID PANEL  -     HEMOGLOBIN A1C WITH EAG  -     metFORMIN ER (GLUCOPHAGE XR) 500 mg tablet; Take 1 Tab by mouth two (2) times a day. 4. Tear of tendon of right ankle, initial encounter  Comments:  being followed by Dr. Gama Huerta. Wearing boot for immbolization. 5. Acquired hypothyroidism  Comments:  checking labs today. Orders:  -     TSH 3RD GENERATION  -     levothyroxine (Synthroid) 88 mcg tablet; Take 1 Tab by mouth Daily (before breakfast). 6. Acquired hypothyroidism  Comments:  Last TSH normal.   Continue synthyroid. Orders:  -     TSH 3RD GENERATION  -     levothyroxine (Synthroid) 88 mcg tablet; Take 1 Tab by mouth Daily (before breakfast). 7. Positive depression screening  Comments:  patient's  passed away x1 month ago, she reported she feels lonely without him. Does have family in the area she gets to see often. 8. Primary insomnia  Comments:  lorazepam at night for sleep. Orders:  -     LORazepam (ATIVAN) 1 mg tablet; Take 1 Tab by mouth nightly as needed (sleep). Max Daily Amount: 1 mg.  Indications: difficulty sleeping     Last PDMP Baldev Sheppard as Reviewed:  Review User Review Instant Review Result   Citlaly Dankati 5/18/2021 10:31 AM Reviewed PDMP [1]       Sintia San NP

## 2021-05-18 NOTE — ASSESSMENT & PLAN NOTE
Managed by oncology, Dr. Mikal Schilling. Had radiation in 2018  On letrozole for 5 years, started in 2019.    Most recent mammo Sept 2020 was normal.

## 2021-05-19 LAB
ALBUMIN SERPL-MCNC: 4.4 G/DL (ref 3.7–4.7)
ALBUMIN/GLOB SERPL: 1.7 {RATIO} (ref 1.2–2.2)
ALP SERPL-CCNC: 96 IU/L (ref 48–121)
ALT SERPL-CCNC: 14 IU/L (ref 0–32)
AST SERPL-CCNC: 26 IU/L (ref 0–40)
BASOPHILS # BLD AUTO: 0.1 X10E3/UL (ref 0–0.2)
BASOPHILS NFR BLD AUTO: 1 %
BILIRUB SERPL-MCNC: 0.9 MG/DL (ref 0–1.2)
BUN SERPL-MCNC: 13 MG/DL (ref 8–27)
BUN/CREAT SERPL: 19 (ref 12–28)
CALCIUM SERPL-MCNC: 9.4 MG/DL (ref 8.7–10.3)
CHLORIDE SERPL-SCNC: 104 MMOL/L (ref 96–106)
CHOLEST SERPL-MCNC: 139 MG/DL (ref 100–199)
CO2 SERPL-SCNC: 25 MMOL/L (ref 20–29)
CREAT SERPL-MCNC: 0.69 MG/DL (ref 0.57–1)
EOSINOPHIL # BLD AUTO: 0.3 X10E3/UL (ref 0–0.4)
EOSINOPHIL NFR BLD AUTO: 5 %
ERYTHROCYTE [DISTWIDTH] IN BLOOD BY AUTOMATED COUNT: 12.3 % (ref 11.7–15.4)
EST. AVERAGE GLUCOSE BLD GHB EST-MCNC: 108 MG/DL
GLOBULIN SER CALC-MCNC: 2.6 G/DL (ref 1.5–4.5)
GLUCOSE SERPL-MCNC: 94 MG/DL (ref 65–99)
HBA1C MFR BLD: 5.4 % (ref 4.8–5.6)
HCT VFR BLD AUTO: 42.6 % (ref 34–46.6)
HDLC SERPL-MCNC: 66 MG/DL
HGB BLD-MCNC: 14 G/DL (ref 11.1–15.9)
IMM GRANULOCYTES # BLD AUTO: 0 X10E3/UL (ref 0–0.1)
IMM GRANULOCYTES NFR BLD AUTO: 0 %
LDLC SERPL CALC-MCNC: 50 MG/DL (ref 0–99)
LYMPHOCYTES # BLD AUTO: 1.3 X10E3/UL (ref 0.7–3.1)
LYMPHOCYTES NFR BLD AUTO: 21 %
MCH RBC QN AUTO: 32.3 PG (ref 26.6–33)
MCHC RBC AUTO-ENTMCNC: 32.9 G/DL (ref 31.5–35.7)
MCV RBC AUTO: 98 FL (ref 79–97)
MONOCYTES # BLD AUTO: 0.7 X10E3/UL (ref 0.1–0.9)
MONOCYTES NFR BLD AUTO: 11 %
NEUTROPHILS # BLD AUTO: 4 X10E3/UL (ref 1.4–7)
NEUTROPHILS NFR BLD AUTO: 62 %
PLATELET # BLD AUTO: 271 X10E3/UL (ref 150–450)
POTASSIUM SERPL-SCNC: 4.8 MMOL/L (ref 3.5–5.2)
PROT SERPL-MCNC: 7 G/DL (ref 6–8.5)
RBC # BLD AUTO: 4.34 X10E6/UL (ref 3.77–5.28)
SODIUM SERPL-SCNC: 143 MMOL/L (ref 134–144)
TRIGL SERPL-MCNC: 133 MG/DL (ref 0–149)
TSH SERPL DL<=0.005 MIU/L-ACNC: 1.74 UIU/ML (ref 0.45–4.5)
VLDLC SERPL CALC-MCNC: 23 MG/DL (ref 5–40)
WBC # BLD AUTO: 6.4 X10E3/UL (ref 3.4–10.8)

## 2021-05-24 ENCOUNTER — TELEPHONE (OUTPATIENT)
Dept: PRIMARY CARE CLINIC | Age: 76
End: 2021-05-24

## 2021-05-24 DIAGNOSIS — E03.9 ACQUIRED HYPOTHYROIDISM: ICD-10-CM

## 2021-05-24 RX ORDER — LEVOTHYROXINE SODIUM 88 UG/1
88 TABLET ORAL
Qty: 7 TABLET | Refills: 0 | Status: SHIPPED | OUTPATIENT
Start: 2021-05-24 | End: 2021-08-05 | Stop reason: SDUPTHER

## 2021-05-24 NOTE — TELEPHONE ENCOUNTER
Please call patient regarding her synthroid prescription she has a few questions she would like to ask.

## 2021-05-24 NOTE — TELEPHONE ENCOUNTER
Pt is asking for a 7 day supply to [facility] on [address] because she took her last synthroid and the mail pharmacy is going to have delayed in getting the medication to her

## 2021-07-06 DIAGNOSIS — G57.92 NEUROPATHY OF LEFT LOWER EXTREMITY: ICD-10-CM

## 2021-07-08 RX ORDER — GABAPENTIN 300 MG/1
CAPSULE ORAL
Qty: 90 CAPSULE | Refills: 0 | Status: SHIPPED | OUTPATIENT
Start: 2021-07-08 | End: 2021-10-28 | Stop reason: SDUPTHER

## 2021-07-08 NOTE — TELEPHONE ENCOUNTER
Last PDMP Jayde Love as Reviewed:  Review User Review Instant Review Result   Isra Segura 7/8/2021  2:10 PM Reviewed PDMP [1]

## 2021-08-05 ENCOUNTER — PATIENT MESSAGE (OUTPATIENT)
Dept: PRIMARY CARE CLINIC | Age: 76
End: 2021-08-05

## 2021-08-05 ENCOUNTER — VIRTUAL VISIT (OUTPATIENT)
Dept: PRIMARY CARE CLINIC | Age: 76
End: 2021-08-05
Payer: MEDICARE

## 2021-08-05 DIAGNOSIS — Z00.00 MEDICARE ANNUAL WELLNESS VISIT, SUBSEQUENT: Primary | ICD-10-CM

## 2021-08-05 DIAGNOSIS — E03.9 ACQUIRED HYPOTHYROIDISM: ICD-10-CM

## 2021-08-05 DIAGNOSIS — F51.01 PRIMARY INSOMNIA: ICD-10-CM

## 2021-08-05 DIAGNOSIS — E11.40 TYPE 2 DIABETES MELLITUS WITH DIABETIC NEUROPATHY, WITHOUT LONG-TERM CURRENT USE OF INSULIN (HCC): ICD-10-CM

## 2021-08-05 PROCEDURE — G0439 PPPS, SUBSEQ VISIT: HCPCS | Performed by: NURSE PRACTITIONER

## 2021-08-05 PROCEDURE — G8399 PT W/DXA RESULTS DOCUMENT: HCPCS | Performed by: NURSE PRACTITIONER

## 2021-08-05 PROCEDURE — G8419 CALC BMI OUT NRM PARAM NOF/U: HCPCS | Performed by: NURSE PRACTITIONER

## 2021-08-05 PROCEDURE — G8536 NO DOC ELDER MAL SCRN: HCPCS | Performed by: NURSE PRACTITIONER

## 2021-08-05 PROCEDURE — G8432 DEP SCR NOT DOC, RNG: HCPCS | Performed by: NURSE PRACTITIONER

## 2021-08-05 PROCEDURE — 3017F COLORECTAL CA SCREEN DOC REV: CPT | Performed by: NURSE PRACTITIONER

## 2021-08-05 PROCEDURE — 1090F PRES/ABSN URINE INCON ASSESS: CPT | Performed by: NURSE PRACTITIONER

## 2021-08-05 PROCEDURE — 2022F DILAT RTA XM EVC RTNOPTHY: CPT | Performed by: NURSE PRACTITIONER

## 2021-08-05 PROCEDURE — G8427 DOCREV CUR MEDS BY ELIG CLIN: HCPCS | Performed by: NURSE PRACTITIONER

## 2021-08-05 PROCEDURE — 1101F PT FALLS ASSESS-DOCD LE1/YR: CPT | Performed by: NURSE PRACTITIONER

## 2021-08-05 PROCEDURE — 3044F HG A1C LEVEL LT 7.0%: CPT | Performed by: NURSE PRACTITIONER

## 2021-08-05 PROCEDURE — 99214 OFFICE O/P EST MOD 30 MIN: CPT | Performed by: NURSE PRACTITIONER

## 2021-08-05 RX ORDER — QUETIAPINE FUMARATE 25 MG/1
25 TABLET, FILM COATED ORAL
Qty: 30 TABLET | Refills: 0 | Status: SHIPPED | OUTPATIENT
Start: 2021-08-05 | End: 2021-09-23

## 2021-08-05 RX ORDER — LEVOTHYROXINE SODIUM 88 UG/1
88 TABLET ORAL
Qty: 7 TABLET | Refills: 0 | Status: SHIPPED | OUTPATIENT
Start: 2021-08-05 | End: 2021-08-05 | Stop reason: SDUPTHER

## 2021-08-05 RX ORDER — METFORMIN HYDROCHLORIDE 500 MG/1
500 TABLET, EXTENDED RELEASE ORAL 2 TIMES DAILY
Qty: 180 TABLET | Refills: 0 | Status: SHIPPED | OUTPATIENT
Start: 2021-08-05 | End: 2021-12-01 | Stop reason: SDUPTHER

## 2021-08-05 RX ORDER — LEVOTHYROXINE SODIUM 88 UG/1
88 TABLET ORAL
Qty: 90 TABLET | Refills: 1 | Status: SHIPPED | OUTPATIENT
Start: 2021-08-05 | End: 2021-08-06 | Stop reason: SDUPTHER

## 2021-08-05 NOTE — PROGRESS NOTES
Chief Complaint   Patient presents with    Follow Up Chronic Condition     Pt wants to talk to you about tappering off lorazepam. Pt needs refills on levothyroxine ([facility]) and metformin ([facility])     There were no vitals taken for this visit. 1. Have you been to the ER, urgent care clinic since your last visit? Hospitalized since your last visit?no    2. Have you seen or consulted any other health care providers outside of the [address] [place] [address] since your last visit? Include any pap smears or colon screening.  No

## 2021-08-05 NOTE — TELEPHONE ENCOUNTER
From: Papa Turner  To: Ricky Uriarte NP  Sent: 8/5/2021 2:33 PM EDT  Subject: Prescription Question    Noam Hernandez, The Levothyroxin prescription is the same thing as Synthroid, right? I always have gotten the Synthroid brand. It's a light green tablet whereas the Levothyroxin was a light yellow pill--as far as I remember. Thank you.   Rafael Bearden

## 2021-08-05 NOTE — PROGRESS NOTES
Papa Turner is a 78 y.o. female presents for Medicare wellness visit. This is a Subsequent Medicare Annual Wellness Visit (AWV), (Performed more than 12 months after effective date of Medicare Part B enrollment and 12 months after last preventive visit.)    I have reviewed the patient's medical history in detail and updated the computerized patient record. History obtained from: the patient. female  78 y.o. WHITE/NON-  Depression Risk Factor Screening:     3 most recent PHQ Screens 8/5/2021   PHQ Not Done Active Diagnosis of Depression or Bipolar Disorder   Little interest or pleasure in doing things -   Feeling down, depressed, irritable, or hopeless -   Total Score PHQ 2 -   Trouble falling or staying asleep, or sleeping too much -   Feeling tired or having little energy -   Poor appetite, weight loss, or overeating -   Feeling bad about yourself - or that you are a failure or have let yourself or your family down -   Trouble concentrating on things such as school, work, reading, or watching TV -   Moving or speaking so slowly that other people could have noticed; or the opposite being so fidgety that others notice -   Thoughts of being better off dead, or hurting yourself in some way -   PHQ 9 Score -   How difficult have these problems made it for you to do your work, take care of your home and get along with others -     [address] [place] [address] 5/18/2021   1) Within the past month, have you wished you were dead or wished you could go to sleep and not wake up? No   2) Have you actually had any thoughts of killing yourself? No   6) Have you ever done anything, started to do anything, or prepared to do anything to end your life? No       Fall Risk Factor Screening:     Fall Risk Assessment, last 12 mths 8/5/2021   Able to walk? Yes   Fall in past 12 months? 1   Do you feel unsteady?  1   Are you worried about falling -   Is the gait abnormal? -   Number of falls in past 12 months 2   Fall with injury? 1       Alcohol Risk Screen    Do you average more than 1 drink per night or more than 7 drinks a week:  No    On any one occasion in the past three months have you have had more than 3 drinks containing alcohol:  No         Functional Ability and Level of Safety:    Hearing: Hearing is good. Activities of Daily Living: The home contains: no safety equipment. Patient does total self care      Ambulation: with no difficulty      Abuse Screen:  Patient is not abused         History and Pertinent Exam   Patient is due for chronic medical conditions and/or has acute concerns in addition to the medicare wellness visit and agrees to do both, understanding there may be a copay for the additional services provided. Other Concerns today:      Insomnia:  Patient has been using lorazepam every night for sleep.   Notes she does not like to take these but notes she can't sleep without it.   She would like to come off of this medication. Patient has only tried melatonin and benadryl in the past.   Patient has tried trazodone in the past and this gave her headaches. Diabetes:  Well controlled on metformin.  Patient's last a1c was 5.5%      Hypothyroidism:  Patient reported that her thyroid levels have been well controlled on synthroid.   Patient denies any concerning symptoms. Health & Diet:   Please describe your diet habits: Regular diet  Do you get 5 servings of fruits or vegetables daily? yes  Do you exercise regularly? yes    Review of Systems   Constitutional: Negative for malaise/fatigue and weight loss. Eyes: Negative for blurred vision and double vision. Respiratory: Negative for shortness of breath. Cardiovascular: Negative for chest pain and palpitations. Genitourinary: Negative for frequency. Neurological: Negative for dizziness and headaches. Endo/Heme/Allergies: Negative for polydipsia. Psychiatric/Behavioral: The patient has insomnia. Reviewed PmHx, FmHx, SocHx as well as meds and allergies, updated and dated in the chart. Patient Active Problem List   Diagnosis Code    Kyphoscoliosis M41.9    Impacted cerumen H61.20    JESSE (generalized anxiety disorder) F41.1    Neuropathy of left lower extremity G57.92    Diverticulosis K57.90    Skin tags, anus or rectum K64.4    Internal hemorrhoids K64.8    Synovitis of right ankle M65.9    Breast cancer, stage 2, right (HCC) C50.911    Primary insomnia F51.01    Sleep apnea G47.30    Type 2 diabetes mellitus with diabetic neuropathy E11.40     Past Medical History:   Diagnosis Date    Allergies     Anxiety     Arrhythmia     A-FIB    Arthritis     BACK AND \"EVERYWHERE\"    Diabetes ([facility] [address]     TYPE II    Diverticulosis     GERD (gastroesophageal reflux disease)     Impacted cerumen 8/10/2020    Internal hemorrhoids     Nausea & vomiting     Skin tags, anus or rectum     found during colonscopy     Sleep apnea     Sleep disorder     Thyroid disease     HYPO      Past Surgical History:   Procedure Laterality Date    HX BREAST LUMPECTOMY Right 11/30/2018    with removal of 5 lymph nodes    HX COLONOSCOPY  09/21/2018    HX DILATION AND CURETTAGE  10/2009    HX ENDOSCOPY  02/20/2017    normal     HX GI  02/20/2017    ENDOSCOPY    HX GI      COLONOSCOPY    HX GYN  1980'S    LAP FOR ENDOMETRIOSIS    HX KNEE REPLACEMENT Right 11/08/2008    HX LUMBAR LAMINECTOMY  2017    frontal/posterior laminectomy with fusion.      HX ORTHOPAEDIC Bilateral 2013, 2014    TRIGGER FINGER (RING FINGER)    HX ORTHOPAEDIC Bilateral 2005, 2015    BONE REMOVAL AND TENDON REPOSITIONING    HX ORTHOPAEDIC Right 08/05/2001    HAMMERTOE (4TH AND 5TH TOE)    HX ORTHOPAEDIC Right 03/2016    WRIST TENDON SNIPPING    HX TONSILLECTOMY  1953    IA CARDIAC SURG PROCEDURE UNLIST  06/06/2017    A-FIB ABLATION    IA CARDIAC SURG PROCEDURE UNLIST  2014, 2016    CARDIOVERSION 3X FOR A-FIB     Allergies Allergen Reactions    Bactrim [Sulfamethoprim] Swelling     \"LIP SWELLING\"     Current Outpatient Medications   Medication Sig Dispense Refill    QUEtiapine (SEROquel) 25 mg tablet Take 1 Tablet by mouth nightly. 30 Tablet 0    metFORMIN ER (GLUCOPHAGE XR) 500 mg tablet Take 1 Tablet by mouth two (2) times a day. 180 Tablet 0    levothyroxine (Synthroid) 88 mcg tablet Take 1 Tablet by mouth Daily (before breakfast). 7 Tablet 0    gabapentin (NEURONTIN) 300 mg capsule TAKE ONE CAPSULE BY MOUTH EVERY EVENING 90 Capsule 0    metoprolol succinate (TOPROL-XL) 25 mg XL tablet Take 1 Tab by mouth daily.  clobetasoL (TEMOVATE) 0.05 % topical cream       LORazepam (ATIVAN) 1 mg tablet Take 1 Tab by mouth nightly as needed (sleep). Max Daily Amount: 1 mg. Indications: difficulty sleeping 90 Tab 1    lancets misc Use as directed to check BG. DX: E11.49 100 Each 3    glucose blood VI test strips (Accu-Chek Laura Plus test strp) strip Use as directed to check BG Daily. DX: E11.49 100 Strip 3    acetaminophen (Tylenol Extra Strength) 500 mg tablet Tylenol Extra Strength 500 mg tablet   Take 2 tablets every 4 hours by oral route.  calcium-vitamin D (OS-KARLA +D3) 500 mg-200 unit per tablet Take 1 Tab by mouth daily.  cholecalciferol (VITAMIN D3) (1000 Units /25 mcg) tablet Take 2,000 Units by mouth daily.  fexofenadine (ALLEGRA) 180 mg tablet Take 180 mg by mouth daily.  letrozole (FEMARA) 2.5 mg tablet Take 2.5 mg by mouth daily.  linaCLOtide (Linzess) 290 mcg cap capsule Take 290 mg by mouth daily.  warfarin (COUMADIN) 5 mg tablet Take 5 mg by mouth daily.  vitamin B complex capsule Take 500 mcg by mouth daily.       omega-3-dha-epa-dpa-fish oil 1,050-1,200 mg cap Omega 3      folic acid/multivit-min/lutein (CENTRUM SILVER PO) Centrum Silver       Family History   Problem Relation Age of Onset    Heart Disease Mother     Hypertension Father      Social History     Tobacco Use    Smoking status: Never Smoker    Smokeless tobacco: Never Used   Substance Use Topics    Alcohol use: Not Currently         BP Readings from Last 3 Encounters:   05/18/21 123/67   08/11/20 110/64   02/13/19 130/70      Wt Readings from Last 3 Encounters:   05/18/21 174 lb (78.9 kg)   08/11/20 176 lb 9.6 oz (80.1 kg)   02/13/19 175 lb (79.4 kg)     There is no height or weight on file to calculate BMI. No exam data present  Physical Exam  Constitutional:       Appearance: Normal appearance. HENT:      Head: Normocephalic. Eyes:      Conjunctiva/sclera: Conjunctivae normal.   Pulmonary:      Effort: Pulmonary effort is normal.   Neurological:      Mental Status: She is alert and oriented to person, place, and time. Psychiatric:         Mood and Affect: Mood normal.         Behavior: Behavior normal.         Thought Content: Thought content normal.         Evaluation of Cognitive Function   Mood/affect:  neutral, happy  Orientation: Person, Place, Time and Situation  Appearance: age appropriate and casually dressed  Family member/caregiver input: None  Normal cognitive function  Specialists/Care Team   [employer].  Velvet Syl has established care with the following healthcare providers:    Patient Care Team:  Arik Segal NP as PCP - General (Nurse Practitioner)  Arik Segal NP as PCP - REHABILITATION [facility] Empaneled Provider  Becca Aguilar MD as Physician (Gastroenterology)      [address] Maintenance Topics with due status: Overdue       Topic Date Due    Hepatitis C Screening Never done    Foot Exam Q1 Never done    MICROALBUMIN Q1 Never done    Colorectal Cancer Screening Combo Never done    Breast Cancer Screen Mammogram Never done    Pneumococcal 65+ years 03/16/2018     Health Maintenance Topics with due status: Not Due       Topic Last Completion Date    Flu Vaccine 10/01/2020    Eye Exam Retinal or Dilated 01/05/2021    DTaP/Tdap/Td series 03/12/2021    A1C test (Diabetic or Prediabetic) 05/18/2021    Lipid Screen 05/18/2021    Medicare Yearly Exam 08/05/2021     Health Maintenance Topics with due status: Completed       Topic Last Completion Date    Shingrix Vaccine Age 50> 01/11/2019    Bone Densitometry (Dexa) Screening 02/08/2021    COVID-19 Vaccine 03/30/2021         Colon cancer:  Recommendation: Colonoscopy every 10y or annual FIT test from 50-75 or every 3 year stool DNA based test with consideration of ongoing screening from 76-85. and Up to date or Completed    Lung cancer (LDCT): Recommendation: Yearly LDCT for pts 55-77 w 30-pack year hx and currently smoke or quit <15 yr ago. and Not Indicated    Hepatitis C:  Recommendation: One time screening for all patient's aged 18-79. and Not Indicated    Diabetes:   Recommendation: USPSTF recommends screening ages 38-67 y/o if overweight or obese. Medicare covers screening in those patients who are overweight, obese, have HTN or dyslipiemia, a personal history of gestational diabetes or prior elevated blood sugar, a family hx of DM, or any patient over age 72 and Up to date or Completed    Lipids:   Recommendation: screening for hyperlipidemia every 5 years after age 39 and Up to date or Completed        PREVENTIVE CARE - FEMALE SCREENINGS     Cervical cancer: Recommendation: Every 3 yr from 21-29 and every 5 yr from 33-67, with Pap and HPV testing. and Not Indicated    Breast cancer: Recommendation:  USPSTF recommends screening mammography every 2 yr from 54-69. The decision to start screening mammography in women prior to age 48 years should be an individual one. Women who place a higher value on the potential benefit than the potential harms may choose to begin biennial screening between the ages of 36 and 52 years.  Medicare covers annual screening mammography, USPSTF also recommends women with a personal or family history of breast, ovarian, tubal, or peritoneal cancer or who have an ancestry ([address]) associated with breast cancer susceptibility 1 and 2 (BRCA1/2) gene mutations with an appropriate brief familial risk assessment tool. Women with a positive result on the risk assessment tool should receive genetic counseling and, if indicated after counseling, genetic testing and mammogram was done elsewhere, record not available    Osteoporosis: Recommendation: Screen all women at 72, earlier if elevated risk and Up to date or Completed    AAA:   Recommendation: One-time screening if family history of AAA and Not Indicated      IMMUNIZATIONS     Immunization History   Administered Date(s) Administered    COVID-19, PFIZER, MRNA, LNP-S, PF, 30MCG/0.3ML DOSE 03/09/2021, 03/30/2021    DTaP 03/12/2021    Influenza Vaccine 10/01/2020    Pneumococcal Conjugate (PCV-13) 03/16/2017    Tdap 03/16/2017    Zoster Recombinant 10/23/2018, 01/11/2019       Pneumovax:   Recommendation: PPSV23 once for all >65 and high risk <65  and Up to date or Completed    Prevnar:   Recommendation: PCV13 only if >65 and immunocompromised or residing in a nursing home, or in areas of low childhood Pneumococcal vaccination and Not Indicated    Influenza:   Recommendation: Vaccination annually, high dose if 65 or older and Up to date or Completed    Shingrix:  Recommendation: Vaccination 2 shots 2-6 months apart for all age >47 and Up to date or Completed    TDaP:    Recommendation: Vaccination Booster with TDaP every 10 yr. and Up to date or Completed    Discussion of Advance Directive   Discussed with [employer]. Kaila Mode her ability to prepare and advance directive in the case that an injury or illness causes her to be unable to make health care decisions. Date of ACP Conversation: 08/05/21  Persons included in Conversation:  patient  Length of ACP Conversation in minutes:  <16 minutes (Non-Billable)    Authorized Decision Maker (if patient is incapable of making informed decisions):    This person is:   Named in Advance Directive or Healthcare Power of             For Patients with Decision Making Capacity:   Values/Goals: Exploration of values, goals, and preferences if recovery is not expected, even with continued medical treatment in the event of:  Imminent death  Severe, permanent brain injury    Conversation Outcomes / Follow-Up Plan:   ACP complete - no further action today    Assessment/Plan   V70.0,     Diagnoses and all orders for this visit:    1. Medicare annual wellness visit, subsequent    2. Acquired hypothyroidism  Comments:  checking labs today. Orders:  -     levothyroxine (Synthroid) 88 mcg tablet; Take 1 Tablet by mouth Daily (before breakfast). 3. Type 2 diabetes mellitus with diabetic neuropathy, without long-term current use of insulin (HCC)  -     metFORMIN ER (GLUCOPHAGE XR) 500 mg tablet; Take 1 Tablet by mouth two (2) times a day. 4. Primary insomnia  Comments:  weaning off of lorazepam.  1/2 tablet of lorazepam, start on low dose seroquel. Orders:  -     QUEtiapine (SEROquel) 25 mg tablet; Take 1 Tablet by mouth nightly. Merlyn Spurling, who was evaluated through a synchronous (real-time) audio-video encounter, and/or her healthcare decision maker, is aware that it is a billable service, with coverage as determined by her insurance carrier. She provided verbal consent to proceed: Yes, and patient identification was verified. This visit was conducted pursuant to the emergency declaration under the Formerly named [facility], [address] [place] [address] authority and the [employer] Act. A caregiver was present when appropriate. Ability to conduct physical exam was limited. The patient was located in a state where the provider was credentialed to provide care.      --Leonard Das NP on 8/5/2021 at 10:44 AM                    Leonard Das NP

## 2021-08-06 RX ORDER — LEVOTHYROXINE SODIUM 88 UG/1
88 TABLET ORAL
Qty: 90 TABLET | Refills: 1 | Status: SHIPPED | OUTPATIENT
Start: 2021-08-06 | End: 2022-01-04 | Stop reason: SDUPTHER

## 2021-09-23 DIAGNOSIS — F51.01 PRIMARY INSOMNIA: ICD-10-CM

## 2021-09-23 RX ORDER — QUETIAPINE FUMARATE 25 MG/1
TABLET, FILM COATED ORAL
Qty: 30 TABLET | Refills: 0 | Status: SHIPPED | OUTPATIENT
Start: 2021-09-23 | End: 2021-12-01 | Stop reason: SDUPTHER

## 2021-10-12 ENCOUNTER — OFFICE VISIT (OUTPATIENT)
Dept: ENT CLINIC | Age: 76
End: 2021-10-12
Payer: MEDICARE

## 2021-10-12 VITALS
BODY MASS INDEX: 27.97 KG/M2 | SYSTOLIC BLOOD PRESSURE: 120 MMHG | TEMPERATURE: 98.4 F | RESPIRATION RATE: 18 BRPM | DIASTOLIC BLOOD PRESSURE: 72 MMHG | HEART RATE: 64 BPM | OXYGEN SATURATION: 98 % | HEIGHT: 66 IN | WEIGHT: 174 LBS

## 2021-10-12 DIAGNOSIS — H61.23 BILATERAL IMPACTED CERUMEN: Primary | ICD-10-CM

## 2021-10-12 PROCEDURE — 69210 REMOVE IMPACTED EAR WAX UNI: CPT | Performed by: OTOLARYNGOLOGY

## 2021-10-22 ENCOUNTER — PATIENT MESSAGE (OUTPATIENT)
Dept: PRIMARY CARE CLINIC | Age: 76
End: 2021-10-22

## 2021-10-22 DIAGNOSIS — E11.40 TYPE 2 DIABETES MELLITUS WITH DIABETIC NEUROPATHY, WITHOUT LONG-TERM CURRENT USE OF INSULIN (HCC): ICD-10-CM

## 2021-10-22 DIAGNOSIS — G57.92 NEUROPATHY OF LEFT LOWER EXTREMITY: ICD-10-CM

## 2021-10-28 RX ORDER — GABAPENTIN 300 MG/1
CAPSULE ORAL
Qty: 90 CAPSULE | Refills: 0 | Status: SHIPPED | OUTPATIENT
Start: 2021-10-28 | End: 2022-01-04 | Stop reason: SDUPTHER

## 2021-10-28 NOTE — TELEPHONE ENCOUNTER
From: Venita Mujica  To: Tamy Burrows NP  Sent: 10/22/2021 10:40 PM EDT  Subject: Non-Urgent Medical Question    Gavin Dumont,   Are there any primary care physicians there now? I am asked at doctors' offices who my primary care physician is. So far I've been listing you. If I should ever need surgery, I would need a pcp wouldn't I? Should I make an appointment for a virtual appointment with you to discuss my Lorazepam progress? Thank you.   Abbey Torres

## 2021-10-28 NOTE — TELEPHONE ENCOUNTER
Last PDMP Evy Lutz as Reviewed:  Review User Review Instant Review Result   Kanchan Justin 10/28/2021  4:53 PM Reviewed PDMP [1]

## 2022-01-04 ENCOUNTER — OFFICE VISIT (OUTPATIENT)
Dept: PRIMARY CARE CLINIC | Age: 77
End: 2022-01-04
Payer: MEDICARE

## 2022-01-04 VITALS
HEIGHT: 66 IN | WEIGHT: 168 LBS | OXYGEN SATURATION: 98 % | BODY MASS INDEX: 27 KG/M2 | TEMPERATURE: 96.8 F | SYSTOLIC BLOOD PRESSURE: 133 MMHG | RESPIRATION RATE: 18 BRPM | DIASTOLIC BLOOD PRESSURE: 72 MMHG | HEART RATE: 57 BPM

## 2022-01-04 DIAGNOSIS — I48.0 PAROXYSMAL ATRIAL FIBRILLATION (HCC): ICD-10-CM

## 2022-01-04 DIAGNOSIS — E03.9 ACQUIRED HYPOTHYROIDISM: ICD-10-CM

## 2022-01-04 DIAGNOSIS — F51.01 PRIMARY INSOMNIA: ICD-10-CM

## 2022-01-04 DIAGNOSIS — G57.92 NEUROPATHY OF LEFT LOWER EXTREMITY: ICD-10-CM

## 2022-01-04 DIAGNOSIS — C50.911 BREAST CANCER, STAGE 2, RIGHT (HCC): ICD-10-CM

## 2022-01-04 DIAGNOSIS — E11.40 TYPE 2 DIABETES MELLITUS WITH DIABETIC NEUROPATHY, WITHOUT LONG-TERM CURRENT USE OF INSULIN (HCC): Primary | ICD-10-CM

## 2022-01-04 PROCEDURE — 2022F DILAT RTA XM EVC RTNOPTHY: CPT | Performed by: NURSE PRACTITIONER

## 2022-01-04 PROCEDURE — 1090F PRES/ABSN URINE INCON ASSESS: CPT | Performed by: NURSE PRACTITIONER

## 2022-01-04 PROCEDURE — 3017F COLORECTAL CA SCREEN DOC REV: CPT | Performed by: NURSE PRACTITIONER

## 2022-01-04 PROCEDURE — G8427 DOCREV CUR MEDS BY ELIG CLIN: HCPCS | Performed by: NURSE PRACTITIONER

## 2022-01-04 PROCEDURE — 99214 OFFICE O/P EST MOD 30 MIN: CPT | Performed by: NURSE PRACTITIONER

## 2022-01-04 PROCEDURE — G8536 NO DOC ELDER MAL SCRN: HCPCS | Performed by: NURSE PRACTITIONER

## 2022-01-04 PROCEDURE — 1101F PT FALLS ASSESS-DOCD LE1/YR: CPT | Performed by: NURSE PRACTITIONER

## 2022-01-04 PROCEDURE — G8399 PT W/DXA RESULTS DOCUMENT: HCPCS | Performed by: NURSE PRACTITIONER

## 2022-01-04 PROCEDURE — G8419 CALC BMI OUT NRM PARAM NOF/U: HCPCS | Performed by: NURSE PRACTITIONER

## 2022-01-04 PROCEDURE — G8432 DEP SCR NOT DOC, RNG: HCPCS | Performed by: NURSE PRACTITIONER

## 2022-01-04 PROCEDURE — 3046F HEMOGLOBIN A1C LEVEL >9.0%: CPT | Performed by: NURSE PRACTITIONER

## 2022-01-04 RX ORDER — QUETIAPINE FUMARATE 25 MG/1
25 TABLET, FILM COATED ORAL
Qty: 90 TABLET | Refills: 1 | Status: SHIPPED | OUTPATIENT
Start: 2022-01-04 | End: 2022-05-05 | Stop reason: ALTCHOICE

## 2022-01-04 RX ORDER — LEVOTHYROXINE SODIUM 88 UG/1
88 TABLET ORAL
Qty: 90 TABLET | Refills: 1 | Status: SHIPPED | OUTPATIENT
Start: 2022-01-04 | End: 2022-01-11 | Stop reason: SDUPTHER

## 2022-01-04 RX ORDER — METOPROLOL SUCCINATE 25 MG/1
25 TABLET, EXTENDED RELEASE ORAL DAILY
Qty: 90 TABLET | Refills: 1 | Status: SHIPPED | OUTPATIENT
Start: 2022-01-04 | End: 2022-05-05

## 2022-01-04 RX ORDER — METFORMIN HYDROCHLORIDE 500 MG/1
500 TABLET, EXTENDED RELEASE ORAL 2 TIMES DAILY
Qty: 180 TABLET | Refills: 1 | Status: SHIPPED | OUTPATIENT
Start: 2022-01-04 | End: 2022-08-16 | Stop reason: SDUPTHER

## 2022-01-04 RX ORDER — GABAPENTIN 300 MG/1
CAPSULE ORAL
Qty: 90 CAPSULE | Refills: 1 | Status: SHIPPED | OUTPATIENT
Start: 2022-01-04 | End: 2022-08-16 | Stop reason: SDUPTHER

## 2022-01-04 NOTE — PROGRESS NOTES
HISTORY OF PRESENT ILLNESS  Yodit Ng is a 78 y.o. female presents for medication refill. Diabetes: Last A1c was   Lab Results   Component Value Date/Time    Hemoglobin A1c 5.4 05/18/2021 11:01 AM    Hemoglobin A1c, External 5.5 06/29/2020 12:00 AM    . Diabetes well controlled on metformin. Patient's last eye exam was Jan 2021. Uses gabapentin for neuropathy and for neuropathy related to breast cancer treatment. Hypothyroidism:  Patient has been using levothyroxine 88 mcg with good control over hypothyroidism. Denies weight changes, palpitations, fatigue or trouble sleeping. Insomnia:  Patient has been using 1/2 tablet of seroquel for sleep with good control. Denies over sedation during the day. Gets 6-8 hours of sleep at night. Atrial Fib: Well controlled on metoprolol.   Follows with Dr. Desiree Allen:    01/04/22 1457   BP: 133/72   Pulse: (!) 57   Resp: 18   Temp: 96.8 °F (36 °C)   TempSrc: Temporal   SpO2: 98%   Weight: 168 lb (76.2 kg)   Height: 5' 6\" (1.676 m)     Patient Active Problem List   Diagnosis Code    Kyphoscoliosis M41.9    Impacted cerumen H61.20    JESSE (generalized anxiety disorder) F41.1    Neuropathy of left lower extremity G57.92    Diverticulosis K57.90    Skin tags, anus or rectum K64.4    Internal hemorrhoids K64.8    Synovitis of right ankle M65.9    Breast cancer, stage 2, right (HCC) C50.911    Primary insomnia F51.01    Sleep apnea G47.30    Type 2 diabetes mellitus with diabetic neuropathy E11.40     Patient Active Problem List    Diagnosis Date Noted    Type 2 diabetes mellitus with diabetic neuropathy 08/05/2021    Sleep apnea     Breast cancer, stage 2, right ([address] 05/18/2021    Primary insomnia 05/18/2021    Diverticulosis     Skin tags, anus or rectum     Internal hemorrhoids     JESSE (generalized anxiety disorder) 09/08/2020    Impacted cerumen 08/10/2020    Neuropathy of left lower extremity 02/26/2019    Kyphoscoliosis 11/15/2017  Synovitis of right ankle 09/26/2017     Current Outpatient Medications   Medication Sig Dispense Refill    gabapentin (NEURONTIN) 300 mg capsule TAKE ONE CAPSULE BY MOUTH EVERY EVENING 90 Capsule 1    metFORMIN ER (GLUCOPHAGE XR) 500 mg tablet Take 1 Tablet by mouth two (2) times a day. 180 Tablet 1    Synthroid 88 mcg tablet Take 1 Tablet by mouth Daily (before breakfast). 90 Tablet 1    QUEtiapine (SEROquel) 25 mg tablet Take 1 Tablet by mouth nightly. 90 Tablet 1    metoprolol succinate (TOPROL-XL) 25 mg XL tablet Take 1 Tablet by mouth daily. 90 Tablet 1    LORazepam (ATIVAN) 0.5 mg tablet Take 1/2 tablet at bedtime by mouth as needed for sleep. Indications: difficulty sleeping 30 Tablet 0    lancets misc Use as directed to check BG. DX: E11.49 100 Each 3    glucose blood VI test strips (Accu-Chek Laura Plus test strp) strip Use as directed to check BG Daily. DX: E11.49 100 Strip 3    acetaminophen (Tylenol Extra Strength) 500 mg tablet Tylenol Extra Strength 500 mg tablet   Take 2 tablets every 4 hours by oral route.  calcium-vitamin D (OS-KARLA +D3) 500 mg-200 unit per tablet Take 1 Tab by mouth daily.  cholecalciferol (VITAMIN D3) (1000 Units /25 mcg) tablet Take 2,000 Units by mouth daily.  fexofenadine (ALLEGRA) 180 mg tablet Take 180 mg by mouth daily.  letrozole (FEMARA) 2.5 mg tablet Take 2.5 mg by mouth daily.  warfarin (COUMADIN) 5 mg tablet Take 5 mg by mouth daily.  vitamin B complex capsule Take 500 mcg by mouth daily.       folic acid/multivit-min/lutein (CENTRUM SILVER PO) Centrum Silver       Allergies   Allergen Reactions    Bactrim [Sulfamethoprim] Swelling     \"LIP SWELLING\"     Past Medical History:   Diagnosis Date    Allergies     Anxiety     Arrhythmia     A-FIB    Arthritis     BACK AND \"EVERYWHERE\"    Diabetes ([facility] [address]     TYPE II    Diverticulosis     GERD (gastroesophageal reflux disease)     Impacted cerumen 8/10/2020    Internal hemorrhoids     Nausea & vomiting     Skin tags, anus or rectum     found during colonscopy     Sleep apnea     Sleep disorder     Thyroid disease     HYPO     Past Surgical History:   Procedure Laterality Date    HX BREAST LUMPECTOMY Right 11/30/2018    with removal of 5 lymph nodes    HX COLONOSCOPY  09/21/2018    HX DILATION AND CURETTAGE  10/2009    HX ENDOSCOPY  02/20/2017    normal     HX GI  02/20/2017    ENDOSCOPY    HX GI      COLONOSCOPY    HX GYN  1980'S    LAP FOR ENDOMETRIOSIS    HX KNEE REPLACEMENT Right 11/08/2008    HX LUMBAR LAMINECTOMY  2017    frontal/posterior laminectomy with fusion.  HX ORTHOPAEDIC Bilateral 2013, 2014    TRIGGER FINGER (RING FINGER)    HX ORTHOPAEDIC Bilateral 2005, 2015    BONE REMOVAL AND TENDON REPOSITIONING    HX ORTHOPAEDIC Right 08/05/2001    HAMMERTOE (4TH AND 5TH TOE)    HX ORTHOPAEDIC Right 03/2016    WRIST TENDON SNIPPING    HX TONSILLECTOMY  1953    [place] CARDIAC SURG PROCEDURE UNLIST  06/06/2017    A-FIB ABLATION    [place] CARDIAC SURG PROCEDURE UNLIST  2014, 2016    CARDIOVERSION 3X FOR A-FIB     Family History   Problem Relation Age of Onset    Heart Disease Mother     Hypertension Father      Social History     Tobacco Use    Smoking status: Never Smoker    Smokeless tobacco: Never Used   Substance Use Topics    Alcohol use: Not Currently           Review of Systems   Constitutional: Negative for malaise/fatigue and weight loss. Eyes: Negative for blurred vision and double vision. Respiratory: Negative for cough and shortness of breath. Cardiovascular: Negative for chest pain, palpitations and leg swelling. Gastrointestinal: Negative for heartburn and nausea. Musculoskeletal: Negative for joint pain and myalgias. Skin: Negative for itching and rash. Neurological: Negative for dizziness, tingling, loss of consciousness, weakness and headaches. Endo/Heme/Allergies: Does not bruise/bleed easily.    Psychiatric/Behavioral: Negative for depression. The patient is not nervous/anxious. Physical Exam  Constitutional:       Appearance: Normal appearance. She is normal weight. HENT:      Head: Normocephalic. Eyes:      Extraocular Movements: Extraocular movements intact. Conjunctiva/sclera: Conjunctivae normal.      Pupils: Pupils are equal, round, and reactive to light. Cardiovascular:      Rate and Rhythm: Normal rate and regular rhythm. Pulses: Normal pulses. Heart sounds: Normal heart sounds. Pulmonary:      Effort: Pulmonary effort is normal.      Breath sounds: Normal breath sounds. Musculoskeletal:         General: Normal range of motion. Cervical back: Normal range of motion and neck supple. Skin:     General: Skin is warm and dry. Neurological:      General: No focal deficit present. Mental Status: She is alert and oriented to person, place, and time. Psychiatric:         Mood and Affect: Mood normal.           ASSESSMENT and PLAN  Diagnoses and all orders for this visit:    1. Type 2 diabetes mellitus with diabetic neuropathy, without long-term current use of insulin ([address]  Comments:  well controlled. Orders:  -     metFORMIN ER (GLUCOPHAGE XR) 500 mg tablet; Take 1 Tablet by mouth two (2) times a day. -     CBC WITH AUTOMATED DIFF  -     HEMOGLOBIN A1C WITH EAG  -     LIPID PANEL  -     METABOLIC PANEL, COMPREHENSIVE  -     MICROALBUMIN, UR, RAND W/ MICROALB/CREAT RATIO    2. Neuropathy of left lower extremity  -     gabapentin (NEURONTIN) 300 mg capsule; TAKE ONE CAPSULE BY MOUTH EVERY EVENING    3. Acquired hypothyroidism  Comments:  checking labs today. Orders:  -     Synthroid 88 mcg tablet; Take 1 Tablet by mouth Daily (before breakfast). -     TSH 3RD GENERATION    4. Primary insomnia  Comments:  1/2 dose of seroquel works well for him. Orders:  -     QUEtiapine (SEROquel) 25 mg tablet; Take 1 Tablet by mouth nightly.     5. Breast cancer, stage 2, right [facility])  Assessment & Plan:   Managed by Dr. Graeme Sosa. Dx 2018, takes letrozole. Mammo clear in September 2021      6. Paroxysmal atrial fibrillation (HCC)  Comments:  typically managed by Dr. Rachel Rose. Uses metoprolol when feels palpitations. Orders:  -     metoprolol succinate (TOPROL-XL) 25 mg XL tablet; Take 1 Tablet by mouth daily.      Last PDMP [place] as Reviewed:  Review User Review Instant Review Result   Liane Carrel 1/4/2022  3:30 PM Reviewed PDMP [1]       Ning Najera NP

## 2022-01-04 NOTE — PROGRESS NOTES
Chief Complaint   Patient presents with    Follow Up Chronic Condition     pt is asking for refills on metformin, metoprolol, synthroid, seroquel gabapentin . Pt wants to talk to you about choles. medication, bags under her eyes. Pt states haven't taking any lorazepam in 3 weeks        1. Have you been to the ER, urgent care clinic since your last visit? Hospitalized since your last visit?no    2. Have you seen or consulted any other health care providers outside of the [address] [place] [address] since your last visit? Include any pap smears or colon screening.  no    Visit Vitals  /72 (BP 1 Location: Right arm, BP Patient Position: At rest, BP Cuff Size: Adult)   Pulse (!) 57   Temp 96.8 °F (36 °C) (Temporal)   Resp 18   Ht 5' 6\" (1.676 m)   Wt 168 lb (76.2 kg)   SpO2 98%   BMI 27.12 kg/m²

## 2022-01-05 LAB
ALBUMIN SERPL-MCNC: 4.4 G/DL (ref 3.7–4.7)
ALBUMIN/CREAT UR: 12 MG/G CREAT (ref 0–29)
ALBUMIN/GLOB SERPL: 1.8 {RATIO} (ref 1.2–2.2)
ALP SERPL-CCNC: 99 IU/L (ref 44–121)
ALT SERPL-CCNC: 14 IU/L (ref 0–32)
AST SERPL-CCNC: 24 IU/L (ref 0–40)
BASOPHILS # BLD AUTO: 0.1 X10E3/UL (ref 0–0.2)
BASOPHILS NFR BLD AUTO: 1 %
BILIRUB SERPL-MCNC: 0.9 MG/DL (ref 0–1.2)
BUN SERPL-MCNC: 14 MG/DL (ref 8–27)
BUN/CREAT SERPL: 21 (ref 12–28)
CALCIUM SERPL-MCNC: 9.5 MG/DL (ref 8.7–10.3)
CHLORIDE SERPL-SCNC: 101 MMOL/L (ref 96–106)
CHOLEST SERPL-MCNC: 174 MG/DL (ref 100–199)
CO2 SERPL-SCNC: 26 MMOL/L (ref 20–29)
CREAT SERPL-MCNC: 0.66 MG/DL (ref 0.57–1)
CREAT UR-MCNC: 81.4 MG/DL
EOSINOPHIL # BLD AUTO: 0.3 X10E3/UL (ref 0–0.4)
EOSINOPHIL NFR BLD AUTO: 4 %
ERYTHROCYTE [DISTWIDTH] IN BLOOD BY AUTOMATED COUNT: 12.1 % (ref 11.7–15.4)
EST. AVERAGE GLUCOSE BLD GHB EST-MCNC: 105 MG/DL
GLOBULIN SER CALC-MCNC: 2.5 G/DL (ref 1.5–4.5)
GLUCOSE SERPL-MCNC: 92 MG/DL (ref 65–99)
HBA1C MFR BLD: 5.3 % (ref 4.8–5.6)
HCT VFR BLD AUTO: 41.6 % (ref 34–46.6)
HDLC SERPL-MCNC: 75 MG/DL
HGB BLD-MCNC: 14.1 G/DL (ref 11.1–15.9)
IMM GRANULOCYTES # BLD AUTO: 0 X10E3/UL (ref 0–0.1)
IMM GRANULOCYTES NFR BLD AUTO: 0 %
LDLC SERPL CALC-MCNC: 74 MG/DL (ref 0–99)
LYMPHOCYTES # BLD AUTO: 1.7 X10E3/UL (ref 0.7–3.1)
LYMPHOCYTES NFR BLD AUTO: 25 %
MCH RBC QN AUTO: 32.9 PG (ref 26.6–33)
MCHC RBC AUTO-ENTMCNC: 33.9 G/DL (ref 31.5–35.7)
MCV RBC AUTO: 97 FL (ref 79–97)
MICROALBUMIN UR-MCNC: 9.6 UG/ML
MONOCYTES # BLD AUTO: 0.7 X10E3/UL (ref 0.1–0.9)
MONOCYTES NFR BLD AUTO: 11 %
NEUTROPHILS # BLD AUTO: 3.9 X10E3/UL (ref 1.4–7)
NEUTROPHILS NFR BLD AUTO: 59 %
PLATELET # BLD AUTO: 269 X10E3/UL (ref 150–450)
POTASSIUM SERPL-SCNC: 4.9 MMOL/L (ref 3.5–5.2)
PROT SERPL-MCNC: 6.9 G/DL (ref 6–8.5)
RBC # BLD AUTO: 4.29 X10E6/UL (ref 3.77–5.28)
SODIUM SERPL-SCNC: 141 MMOL/L (ref 134–144)
TRIGL SERPL-MCNC: 150 MG/DL (ref 0–149)
TSH SERPL DL<=0.005 MIU/L-ACNC: 1.34 UIU/ML (ref 0.45–4.5)
VLDLC SERPL CALC-MCNC: 25 MG/DL (ref 5–40)
WBC # BLD AUTO: 6.6 X10E3/UL (ref 3.4–10.8)

## 2022-01-10 ENCOUNTER — PATIENT MESSAGE (OUTPATIENT)
Dept: PRIMARY CARE CLINIC | Age: 77
End: 2022-01-10

## 2022-01-10 DIAGNOSIS — E03.9 ACQUIRED HYPOTHYROIDISM: ICD-10-CM

## 2022-01-11 RX ORDER — LEVOTHYROXINE SODIUM 88 UG/1
88 TABLET ORAL
Qty: 90 TABLET | Refills: 1 | Status: SHIPPED | OUTPATIENT
Start: 2022-01-11 | End: 2022-04-06

## 2022-01-11 NOTE — TELEPHONE ENCOUNTER
From: Michael Macdonald  To: Atiya Jarrell NP  Sent: 1/10/2022 3:53 PM EST  Subject: Synthroid prescription    Tim Fitzpatrick,   I am so sorry I forgot to let you know that I get my Synthroid 88mcg prescription from [facility] in [place], [place]. I asked Eva to un-fill the $120 one. I can get it from [facility] for much less. Adrian's phone # is [phone] and their Fax # is [phone]. I noticed my Triglyceride level was 150. I am SURE that is due to the Letrozole. I am in agreement with you that a statin is not for me. Most of them will raise your blood sugar level AND if you've had tendon problems, they will worsen with a statin. Don't want anymore ruptured tendons! I guess I should eat more baked fish and salads with lite dressing. If you have anymore suggestions, I'd appreciate it. Thank you.

## 2022-03-18 PROBLEM — H61.20 IMPACTED CERUMEN: Status: ACTIVE | Noted: 2020-08-10

## 2022-03-18 PROBLEM — G57.92 NEUROPATHY OF LEFT LOWER EXTREMITY: Status: ACTIVE | Noted: 2019-02-26

## 2022-03-18 PROBLEM — M65.971 SYNOVITIS OF RIGHT ANKLE: Status: ACTIVE | Noted: 2017-09-26

## 2022-03-18 PROBLEM — M65.9 SYNOVITIS OF RIGHT ANKLE: Status: ACTIVE | Noted: 2017-09-26

## 2022-03-18 PROBLEM — F51.01 PRIMARY INSOMNIA: Status: ACTIVE | Noted: 2021-05-18

## 2022-03-19 PROBLEM — E11.40 TYPE 2 DIABETES MELLITUS WITH DIABETIC NEUROPATHY (HCC): Status: ACTIVE | Noted: 2021-08-05

## 2022-03-19 PROBLEM — C50.911 BREAST CANCER, STAGE 2, RIGHT (HCC): Status: ACTIVE | Noted: 2021-05-18

## 2022-03-19 PROBLEM — M41.9 KYPHOSCOLIOSIS: Status: ACTIVE | Noted: 2017-11-15

## 2022-03-19 PROBLEM — F41.1 GAD (GENERALIZED ANXIETY DISORDER): Status: ACTIVE | Noted: 2020-09-08

## 2022-04-06 DIAGNOSIS — E03.9 ACQUIRED HYPOTHYROIDISM: ICD-10-CM

## 2022-04-06 RX ORDER — LEVOTHYROXINE SODIUM 88 UG/1
TABLET ORAL
Qty: 90 TABLET | Refills: 1 | Status: SHIPPED | OUTPATIENT
Start: 2022-04-06 | End: 2022-08-16 | Stop reason: SDUPTHER

## 2022-05-05 ENCOUNTER — OFFICE VISIT (OUTPATIENT)
Dept: PRIMARY CARE CLINIC | Age: 77
End: 2022-05-05
Payer: MEDICARE

## 2022-05-05 VITALS
HEART RATE: 84 BPM | BODY MASS INDEX: 27.42 KG/M2 | RESPIRATION RATE: 16 BRPM | HEIGHT: 66 IN | OXYGEN SATURATION: 98 % | WEIGHT: 170.6 LBS | DIASTOLIC BLOOD PRESSURE: 61 MMHG | SYSTOLIC BLOOD PRESSURE: 115 MMHG | TEMPERATURE: 97.1 F

## 2022-05-05 DIAGNOSIS — Z86.79 STATUS POST ABLATION OF ATRIAL FIBRILLATION: ICD-10-CM

## 2022-05-05 DIAGNOSIS — Z98.890 STATUS POST ABLATION OF ATRIAL FIBRILLATION: ICD-10-CM

## 2022-05-05 DIAGNOSIS — I48.0 PAROXYSMAL ATRIAL FIBRILLATION (HCC): ICD-10-CM

## 2022-05-05 DIAGNOSIS — F51.01 PRIMARY INSOMNIA: ICD-10-CM

## 2022-05-05 DIAGNOSIS — E89.0 POSTABLATIVE HYPOTHYROIDISM: ICD-10-CM

## 2022-05-05 DIAGNOSIS — R19.6 HALITOSIS: ICD-10-CM

## 2022-05-05 DIAGNOSIS — Z85.3 HISTORY OF BREAST CANCER: ICD-10-CM

## 2022-05-05 DIAGNOSIS — E11.40 TYPE 2 DIABETES MELLITUS WITH DIABETIC NEUROPATHY, WITHOUT LONG-TERM CURRENT USE OF INSULIN (HCC): Primary | ICD-10-CM

## 2022-05-05 DIAGNOSIS — Z79.01 CHRONIC ANTICOAGULATION: ICD-10-CM

## 2022-05-05 DIAGNOSIS — N39.46 MIXED STRESS AND URGE URINARY INCONTINENCE: ICD-10-CM

## 2022-05-05 PROBLEM — E03.9 ACQUIRED HYPOTHYROIDISM: Status: ACTIVE | Noted: 2022-05-05

## 2022-05-05 PROCEDURE — 3044F HG A1C LEVEL LT 7.0%: CPT | Performed by: FAMILY MEDICINE

## 2022-05-05 PROCEDURE — 99215 OFFICE O/P EST HI 40 MIN: CPT | Performed by: FAMILY MEDICINE

## 2022-05-05 RX ORDER — METOPROLOL SUCCINATE 25 MG/1
12.5 TABLET, EXTENDED RELEASE ORAL DAILY
Qty: 90 TABLET | Refills: 1
Start: 2022-05-05

## 2022-05-05 RX ORDER — TEMAZEPAM 7.5 MG/1
7.5 CAPSULE ORAL
Qty: 90 CAPSULE | Refills: 0 | Status: SHIPPED
Start: 2022-05-05 | End: 2022-05-11

## 2022-05-05 RX ORDER — OXYBUTYNIN CHLORIDE 10 MG/1
10 TABLET, EXTENDED RELEASE ORAL DAILY
Qty: 90 TABLET | Refills: 1 | Status: SHIPPED | OUTPATIENT
Start: 2022-05-05

## 2022-05-05 NOTE — PROGRESS NOTES
Chief Complaint   Patient presents with    Other     Sleep issues, bad breathe, ankle injury from fall, urinary incontinence     Visit Vitals  /61 (BP 1 Location: Left upper arm, BP Patient Position: Sitting, BP Cuff Size: Adult)   Pulse 84   Temp 97.1 °F (36.2 °C) (Temporal)   Resp 16   Ht 5' 6\" (1.676 m)   Wt 170 lb 9.6 oz (77.4 kg)   SpO2 98%   BMI 27.54 kg/m²     1. \"Have you been to the ER, urgent care clinic since your last visit? Hospitalized since your last visit? \" Yes When: April 23, 2022    2. \"Have you seen or consulted any other health care providers outside of the [address] [place] [address] since your last visit? \" Yes Where: [facility]      3. For patients aged 39-70: Has the patient had a colonoscopy / FIT/ Cologuard? No      If the patient is female:    4. For patients aged 41-77: Has the patient had a mammogram within the past 2 years? Yes - no Care Gap present      5. For patients aged 21-65: Has the patient had a pap smear?  No

## 2022-05-05 NOTE — PROGRESS NOTES
Malathi Chau (: 1945) is a 78 y.o. female, established patient, here for evaluation of the following chief complaint(s): Other (Sleep issues, bad breathe, ankle injury from fall, urinary incontinence)       ASSESSMENT/PLAN:  Below is the assessment and plan developed based on review of pertinent history, physical exam, labs, studies, and medications. 1. Type 2 diabetes mellitus with diabetic neuropathy, without long-term current use of insulin (HCC)  Chronic, controlled  -     HEMOGLOBIN A1C WITH EAG  -     MICROALBUMIN, UR, RAND W/ MICROALB/CREAT RATIO  -     METABOLIC PANEL, COMPREHENSIVE  -     CBC WITH AUTOMATED DIFF  -     LIPID PANEL  -      DIABETES FOOT EXAM  Continue metformin 500 mg twice daily, foot exam performed, findings below, labs as ordered. Diabetic diet and physical activity as tolerated. Of note, patient has a ruptured right Achilles tendon and is currently ambulating with a cane, modified exercises that will not aggravate injury. 2. Postablative hypothyroidism  Chronic, stable  -     TSH RFX ON ABNORMAL TO FREE T4  Continue levothyroxine 88 mcg daily. 3. Primary insomnia  Chronic, unchanged  -     temazepam (RESTORIL) 7.5 mg capsule; Take 1 Capsule by mouth nightly as needed for Sleep. Max Daily Amount: 7.5 mg., Normal, Disp-90 Capsule, R-0  Will trial temazepam 7.5 mg daily and discontinue Ativan. 4. Paroxysmal atrial fibrillation (HCC)  Chronic  -     metoprolol succinate (TOPROL-XL) 25 mg XL tablet; Take 0.5 Tablets by mouth daily. , No Print, Disp-90 Tablet, R-1  -     PROTHROMBIN TIME + INR  CVR, patient has follow-up with Dr. Naty Beatty, recommend patient take metoprolol daily and or discuss use of antiarrhythmics with Dr. Naty Beatty when she sees him. It is reasonable to reduce to a tolerable dose, 12.5 mg.  5. Chronic anticoagulation  -     PROTHROMBIN TIME + INR  6. Status post ablation of atrial fibrillation  7.  Mixed stress and urge urinary incontinence  Chronic  - oxybutynin chloride XL (DITROPAN XL) 10 mg CR tablet; Take 1 Tablet by mouth daily. , Normal, Disp-90 Tablet, R-1  -     URINALYSIS W/ RFLX MICROSCOPIC  Patient having urinary leakage with laughing, coughing consistent with stress incontinence. Patient also having urge incontinence. Will prescribe Ditropan. 8. Halitosis  Chronic  Being worked up by GI.  9. History of breast cancer      Return in about 6 months (around 11/5/2022) for chronic care follow up. SUBJECTIVE/OBJECTIVE:  HPI    68-year-old female history of paroxysmal atrial fibrillation taking Coumadin status post ablation, type 2 diabetes mellitus, non-insulin-dependent, primary insomnia, post ablative hypothyroidism, varicosities, history of breast cancer status post right lumpectomy on letrozole presents to the office for routine office follow-up and medication refills. Patient states diabetes is well controlled, average blood sugar reading is 90 mg/dL. She has a peripheral neuropathy secondary to kyphoscoliosis and type 2 diabetes mellitus. She is taking gabapentin 300 mg at bedtime with good control of her neuropathy pain. Patient follows up with Dr. Olga Larsen in podiatry for her foot exams and Dr. Randol Essex for eye exams. Patient follows with Dr. Katie Guo in cardiology for PAF. Patient states she can immediately tell when her heart is beating fast or skipping a beat. This is when she takes her metoprolol. Patient states that she is unable to tolerate taking it every day because it causes extreme weakness. She recently just had radiofrequency ablation of varicosities in her right leg with Dr. Fina Hankins. She follows up with Dr. Yelitza Hernández in oncology. She is due to see Dr. Eun Jack in GI for several month history of halitosis (patient assumed that this was due to taking B12 supplements but she has discontinued these and continues to have a problem with bad breath).   No history of tonsillar stones, patient has had an endoscopy in the past revealing gastritis. She is taking medication for GERD with good control of reflux. Patient states she is taking lorazepam primarily for insomnia but still complains of difficulty falling asleep. Allergies   Allergen Reactions    Bactrim [Sulfamethoprim] Swelling     \"LIP SWELLING\"     Current Outpatient Medications   Medication Sig    metoprolol succinate (TOPROL-XL) 25 mg XL tablet Take 0.5 Tablets by mouth daily.  oxybutynin chloride XL (DITROPAN XL) 10 mg CR tablet Take 1 Tablet by mouth daily.  temazepam (RESTORIL) 7.5 mg capsule Take 1 Capsule by mouth nightly as needed for Sleep. Max Daily Amount: 7.5 mg.    Synthroid 88 mcg tablet TAKE 1 TABLET DAILY BEFORE BREAKFAST FOR HYPOTHYROIDISM    gabapentin (NEURONTIN) 300 mg capsule TAKE ONE CAPSULE BY MOUTH EVERY EVENING    metFORMIN ER (GLUCOPHAGE XR) 500 mg tablet Take 1 Tablet by mouth two (2) times a day.  LORazepam (ATIVAN) 0.5 mg tablet Take 1/2 tablet at bedtime by mouth as needed for sleep. Indications: difficulty sleeping    glucose blood VI test strips (Accu-Chek Laura Plus test strp) strip Use as directed to check BG Daily. DX: E11.49    acetaminophen (Tylenol Extra Strength) 500 mg tablet Tylenol Extra Strength 500 mg tablet   Take 2 tablets every 4 hours by oral route.  cholecalciferol (VITAMIN D3) (1000 Units /25 mcg) tablet Take 2,000 Units by mouth daily.  fexofenadine (ALLEGRA) 180 mg tablet Take 180 mg by mouth daily.  letrozole (FEMARA) 2.5 mg tablet Take 2.5 mg by mouth daily.  warfarin (COUMADIN) 5 mg tablet Take 5 mg by mouth daily. 2.5 mg on Tuesday and Thursdays and 5 mg other days    lancets misc Use as directed to check BG. DX: E11.49     No current facility-administered medications for this visit.      Past Medical History:   Diagnosis Date    Allergies     Anxiety     Arrhythmia     A-FIB    Arthritis     BACK AND \"EVERYWHERE\"    Diabetes ([facility] [address]     TYPE II    Diverticulosis     GERD (gastroesophageal reflux disease)     Impacted cerumen 8/10/2020    Internal hemorrhoids     Nausea & vomiting     Skin tags, anus or rectum     found during colonscopy     Sleep apnea     Sleep disorder     Thyroid disease     HYPO     Past Surgical History:   Procedure Laterality Date    HX BREAST LUMPECTOMY Right 11/30/2018    with removal of 5 lymph nodes    HX COLONOSCOPY  09/21/2018    HX DILATION AND CURETTAGE  10/2009    HX ENDOSCOPY  02/20/2017    normal     HX GI  02/20/2017    ENDOSCOPY    HX GI      COLONOSCOPY    HX GYN  1980'S    LAP FOR ENDOMETRIOSIS    HX KNEE REPLACEMENT Right 11/08/2008    HX LUMBAR LAMINECTOMY  2017    frontal/posterior laminectomy with fusion.  HX ORTHOPAEDIC Bilateral 2013, 2014    TRIGGER FINGER (RING FINGER)    HX ORTHOPAEDIC Bilateral 2005, 2015    BONE REMOVAL AND TENDON REPOSITIONING    HX ORTHOPAEDIC Right 08/05/2001    HAMMERTOE (4TH AND 5TH TOE)    HX ORTHOPAEDIC Right 03/2016    WRIST TENDON SNIPPING    HX TONSILLECTOMY  1953    [place] CARDIAC SURG PROCEDURE UNLIST  06/06/2017    A-FIB ABLATION    [place] CARDIAC SURG PROCEDURE UNLIST  2014, 2016    CARDIOVERSION 3X FOR A-FIB     Family History   Problem Relation Age of Onset    Heart Disease Mother     Hypertension Father      Social History     Tobacco Use   Smoking Status Never Smoker   Smokeless Tobacco Never Used         Review of Systems    Negative unless listed in HPI.     /61 (BP 1 Location: Left upper arm, BP Patient Position: Sitting, BP Cuff Size: Adult)   Pulse 84   Temp 97.1 °F (36.2 °C) (Temporal)   Resp 16   Ht 5' 6\" (1.676 m)   Wt 170 lb 9.6 oz (77.4 kg)   SpO2 98%   BMI 27.54 kg/m²    Physical Exam    General appearance: NAD, conversant   Eyes: anicteric sclerae, moist conjunctivae, no lid-lag, PERRLA  HENT: Atraumatic; oropharynx clear with moist mucous membranes and no mucosal ulcerations; normal hard and soft palate  Neck: Trachea midline; FROM, supple, no thyromegaly or lymphadenopathy  Lungs: CTA, with normal respiratory effort and no intercostal retractions  CV: Regular rate, normal rhythm, no JVD, no MRG  Abdomen: Soft, non-tender; no masses or HSM  Extremities: No peripheral edema or extremity lymphadenopathy  Skin: Normal temperature, turgor and texture; no rash, ulcers or subcutaneous nodules  Neuro: CN 2-12 grossly intact. DTRs 2+ and symmetrical throughout. Stable gait. Psych: Appropriate affect, alert and oriented to person, place and time    Diabetic Foot Exam  -Inspection: Onychomycosis involving multiple toenails  -Palpation: no TTP over foot or ankle  -Vascular: PT and DP pulse 2+ blt  -Sensation: sensation to monofilament intact over entire foot blt      On this date 05/05/2022 I have spent 50 minutes reviewing previous notes, test results and face to face with the patient discussing the diagnosis and importance of compliance with the treatment plan as well as documenting on the day of the visit. An electronic signature was used to authenticate this note.   -- Quentin Escamilla MD   [facility] [address]

## 2022-05-07 LAB
ALBUMIN SERPL-MCNC: 4.4 G/DL (ref 3.7–4.7)
ALBUMIN/CREAT UR: 10 MG/G CREAT (ref 0–29)
ALBUMIN/GLOB SERPL: 2 {RATIO} (ref 1.2–2.2)
ALP SERPL-CCNC: 100 IU/L (ref 44–121)
ALT SERPL-CCNC: 16 IU/L (ref 0–32)
APPEARANCE UR: CLEAR
AST SERPL-CCNC: 24 IU/L (ref 0–40)
BASOPHILS # BLD AUTO: 0.1 X10E3/UL (ref 0–0.2)
BASOPHILS NFR BLD AUTO: 1 %
BILIRUB SERPL-MCNC: 1.1 MG/DL (ref 0–1.2)
BILIRUB UR QL STRIP: NEGATIVE
BUN SERPL-MCNC: 16 MG/DL (ref 8–27)
BUN/CREAT SERPL: 24 (ref 12–28)
CALCIUM SERPL-MCNC: 9.2 MG/DL (ref 8.7–10.3)
CHLORIDE SERPL-SCNC: 100 MMOL/L (ref 96–106)
CHOLEST SERPL-MCNC: 156 MG/DL (ref 100–199)
CO2 SERPL-SCNC: 25 MMOL/L (ref 20–29)
COLOR UR: YELLOW
CREAT SERPL-MCNC: 0.67 MG/DL (ref 0.57–1)
CREAT UR-MCNC: 63 MG/DL
EGFR: 91 ML/MIN/1.73
EOSINOPHIL # BLD AUTO: 0.3 X10E3/UL (ref 0–0.4)
EOSINOPHIL NFR BLD AUTO: 4 %
ERYTHROCYTE [DISTWIDTH] IN BLOOD BY AUTOMATED COUNT: 12.2 % (ref 11.7–15.4)
EST. AVERAGE GLUCOSE BLD GHB EST-MCNC: 114 MG/DL
GLOBULIN SER CALC-MCNC: 2.2 G/DL (ref 1.5–4.5)
GLUCOSE SERPL-MCNC: 96 MG/DL (ref 65–99)
GLUCOSE UR QL STRIP: NEGATIVE
HBA1C MFR BLD: 5.6 % (ref 4.8–5.6)
HCT VFR BLD AUTO: 40.4 % (ref 34–46.6)
HDLC SERPL-MCNC: 73 MG/DL
HGB BLD-MCNC: 13.6 G/DL (ref 11.1–15.9)
HGB UR QL STRIP: NEGATIVE
IMM GRANULOCYTES # BLD AUTO: 0 X10E3/UL (ref 0–0.1)
IMM GRANULOCYTES NFR BLD AUTO: 0 %
INR PPP: 1.3 (ref 0.9–1.2)
KETONES UR QL STRIP: NEGATIVE
LDLC SERPL CALC-MCNC: 64 MG/DL (ref 0–99)
LEUKOCYTE ESTERASE UR QL STRIP: NEGATIVE
LYMPHOCYTES # BLD AUTO: 1.6 X10E3/UL (ref 0.7–3.1)
LYMPHOCYTES NFR BLD AUTO: 27 %
MCH RBC QN AUTO: 33.3 PG (ref 26.6–33)
MCHC RBC AUTO-ENTMCNC: 33.7 G/DL (ref 31.5–35.7)
MCV RBC AUTO: 99 FL (ref 79–97)
MICRO URNS: NORMAL
MICROALBUMIN UR-MCNC: 6.3 UG/ML
MONOCYTES # BLD AUTO: 0.6 X10E3/UL (ref 0.1–0.9)
MONOCYTES NFR BLD AUTO: 11 %
NEUTROPHILS # BLD AUTO: 3.3 X10E3/UL (ref 1.4–7)
NEUTROPHILS NFR BLD AUTO: 57 %
NITRITE UR QL STRIP: NEGATIVE
PH UR STRIP: 5.5 [PH] (ref 5–7.5)
PLATELET # BLD AUTO: 274 X10E3/UL (ref 150–450)
POTASSIUM SERPL-SCNC: 4.1 MMOL/L (ref 3.5–5.2)
PROT SERPL-MCNC: 6.6 G/DL (ref 6–8.5)
PROT UR QL STRIP: NEGATIVE
PROTHROMBIN TIME: 13.9 SEC (ref 9.1–12)
RBC # BLD AUTO: 4.08 X10E6/UL (ref 3.77–5.28)
SODIUM SERPL-SCNC: 139 MMOL/L (ref 134–144)
SP GR UR STRIP: 1.01 (ref 1–1.03)
TRIGL SERPL-MCNC: 108 MG/DL (ref 0–149)
TSH SERPL DL<=0.005 MIU/L-ACNC: 3.52 UIU/ML (ref 0.45–4.5)
UROBILINOGEN UR STRIP-MCNC: 0.2 MG/DL (ref 0.2–1)
VLDLC SERPL CALC-MCNC: 19 MG/DL (ref 5–40)
WBC # BLD AUTO: 5.8 X10E3/UL (ref 3.4–10.8)

## 2022-05-11 ENCOUNTER — TELEPHONE (OUTPATIENT)
Dept: PRIMARY CARE CLINIC | Age: 77
End: 2022-05-11

## 2022-05-11 DIAGNOSIS — F51.01 PRIMARY INSOMNIA: ICD-10-CM

## 2022-05-11 RX ORDER — LORAZEPAM 0.5 MG/1
TABLET ORAL
Qty: 30 TABLET | Refills: 0 | Status: SHIPPED
Start: 2022-05-11 | End: 2022-05-27 | Stop reason: ALTCHOICE

## 2022-05-11 NOTE — TELEPHONE ENCOUNTER
Insurance won't cover Restoril. Pt wants to go back to lorazepam 1 mg (90 day qty) and cut the pill into 2 if possible.

## 2022-05-11 NOTE — TELEPHONE ENCOUNTER
Attempted to contact patient to inform her of medication update, no answer. Left voicemail and callback number.

## 2022-05-16 ENCOUNTER — TELEPHONE (OUTPATIENT)
Dept: PRIMARY CARE CLINIC | Age: 77
End: 2022-05-16

## 2022-05-16 DIAGNOSIS — I48.0 PAROXYSMAL ATRIAL FIBRILLATION (HCC): Primary | ICD-10-CM

## 2022-05-16 NOTE — PROGRESS NOTES
Patient informed of test results and recommendations on MyChart.   Patient will need to come back in 1 to 2 weeks for PT/INR lab test.

## 2022-05-27 ENCOUNTER — PATIENT MESSAGE (OUTPATIENT)
Dept: PRIMARY CARE CLINIC | Age: 77
End: 2022-05-27

## 2022-05-27 DIAGNOSIS — F51.01 PRIMARY INSOMNIA: Primary | ICD-10-CM

## 2022-05-27 RX ORDER — LORAZEPAM 1 MG/1
1 TABLET ORAL
Qty: 30 TABLET | Refills: 1 | Status: SHIPPED | OUTPATIENT
Start: 2022-05-27 | End: 2022-07-22 | Stop reason: SDUPTHER

## 2022-05-27 NOTE — TELEPHONE ENCOUNTER
From: Dileep Madrigal  To: Chris Matos NP  Sent: 5/27/2022 2:05 AM EDT  Subject: Lorazepam request    Dear Allison Millan,   I am just such a chicken to take the Temazepam. Could you call in a Lorazepam prescription for 1 mg for 30 days? Thank you.   Zechariah Ny

## 2022-05-27 NOTE — TELEPHONE ENCOUNTER
Last PDMP [place] as Reviewed:  Review User Review Instant Review Result   Gloria Boxer 5/27/2022  9:28 AM Reviewed PDMP [1]

## 2022-07-22 ENCOUNTER — HOSPITAL ENCOUNTER (EMERGENCY)
Age: 77
Discharge: HOME | End: 2022-07-23
Attending: EMERGENCY MEDICINE
Payer: MEDICARE

## 2022-07-22 ENCOUNTER — PATIENT MESSAGE (OUTPATIENT)
Dept: PRIMARY CARE CLINIC | Age: 77
End: 2022-07-22

## 2022-07-22 DIAGNOSIS — R10.13 ABDOMINAL PAIN, EPIGASTRIC: ICD-10-CM

## 2022-07-22 DIAGNOSIS — K21.9 GASTROESOPHAGEAL REFLUX DISEASE, UNSPECIFIED WHETHER ESOPHAGITIS PRESENT: Primary | ICD-10-CM

## 2022-07-22 DIAGNOSIS — F51.01 PRIMARY INSOMNIA: ICD-10-CM

## 2022-07-22 LAB
ALBUMIN SERPL-MCNC: 4.7 G/DL (ref 3.5–5.2)
ALBUMIN/GLOB SERPL: 1.8 {RATIO} (ref 1.1–2.2)
ALP SERPL-CCNC: 107 U/L (ref 35–104)
ALT SERPL-CCNC: 15 U/L (ref 10–35)
ANION GAP SERPL CALC-SCNC: 11 MMOL/L (ref 5–15)
AST SERPL-CCNC: 28 U/L (ref 10–35)
BASOPHILS # BLD: 0.1 K/UL (ref 0–0.1)
BASOPHILS NFR BLD: 1 % (ref 0–1)
BILIRUB SERPL-MCNC: 1.2 MG/DL (ref 0.2–1)
BUN SERPL-MCNC: 14 MG/DL (ref 8–23)
BUN/CREAT SERPL: 26 (ref 12–20)
CALCIUM SERPL-MCNC: 10 MG/DL (ref 8.8–10.2)
CHLORIDE SERPL-SCNC: 102 MMOL/L (ref 98–107)
CO2 SERPL-SCNC: 27 MMOL/L (ref 22–29)
CREAT SERPL-MCNC: 0.53 MG/DL (ref 0.5–0.9)
DIFFERENTIAL METHOD BLD: NORMAL
EOSINOPHIL # BLD: 0.2 K/UL (ref 0–0.4)
EOSINOPHIL NFR BLD: 2 % (ref 0–7)
ERYTHROCYTE [DISTWIDTH] IN BLOOD BY AUTOMATED COUNT: 12.8 % (ref 11.5–14.5)
GLOBULIN SER CALC-MCNC: 2.6 G/DL (ref 2–4)
GLUCOSE SERPL-MCNC: 110 MG/DL (ref 65–100)
HCT VFR BLD AUTO: 40.9 % (ref 35–47)
HGB BLD-MCNC: 13.6 G/DL (ref 11.5–16)
IMM GRANULOCYTES # BLD AUTO: 0 K/UL (ref 0–0.04)
IMM GRANULOCYTES NFR BLD AUTO: 0 % (ref 0–0.5)
LIPASE SERPL-CCNC: 30 U/L (ref 13–60)
LYMPHOCYTES # BLD: 1.5 K/UL (ref 0.8–3.5)
LYMPHOCYTES NFR BLD: 16 % (ref 12–49)
MCH RBC QN AUTO: 32.2 PG (ref 26–34)
MCHC RBC AUTO-ENTMCNC: 33.3 G/DL (ref 30–36.5)
MCV RBC AUTO: 96.7 FL (ref 80–99)
MONOCYTES # BLD: 0.9 K/UL (ref 0–1)
MONOCYTES NFR BLD: 9 % (ref 5–13)
NEUTS SEG # BLD: 6.9 K/UL (ref 1.8–8)
NEUTS SEG NFR BLD: 72 % (ref 32–75)
NRBC # BLD: 0 K/UL (ref 0–0.01)
NRBC BLD-RTO: 0 PER 100 WBC
PLATELET # BLD AUTO: 284 K/UL (ref 150–400)
PMV BLD AUTO: 9.7 FL (ref 8.9–12.9)
POTASSIUM SERPL-SCNC: 4 MMOL/L (ref 3.5–5.1)
PROT SERPL-MCNC: 7.3 G/DL (ref 6.4–8.3)
RBC # BLD AUTO: 4.23 M/UL (ref 3.8–5.2)
SODIUM SERPL-SCNC: 140 MMOL/L (ref 136–145)
TROPONIN I BLD-MCNC: <0.04 NG/ML (ref 0–0.08)
WBC # BLD AUTO: 9.6 K/UL (ref 3.6–11)

## 2022-07-22 PROCEDURE — 74011000250 HC RX REV CODE- 250: Performed by: EMERGENCY MEDICINE

## 2022-07-22 PROCEDURE — 85025 COMPLETE CBC W/AUTO DIFF WBC: CPT

## 2022-07-22 PROCEDURE — 83690 ASSAY OF LIPASE: CPT

## 2022-07-22 PROCEDURE — 80053 COMPREHEN METABOLIC PANEL: CPT

## 2022-07-22 PROCEDURE — 36415 COLL VENOUS BLD VENIPUNCTURE: CPT

## 2022-07-22 PROCEDURE — 84484 ASSAY OF TROPONIN QUANT: CPT

## 2022-07-22 PROCEDURE — 99284 EMERGENCY DEPT VISIT MOD MDM: CPT

## 2022-07-22 PROCEDURE — 93005 ELECTROCARDIOGRAM TRACING: CPT

## 2022-07-22 PROCEDURE — 74011250637 HC RX REV CODE- 250/637: Performed by: EMERGENCY MEDICINE

## 2022-07-22 RX ORDER — DICYCLOMINE HYDROCHLORIDE 10 MG/1
40 CAPSULE ORAL ONCE
Status: COMPLETED | OUTPATIENT
Start: 2022-07-23 | End: 2022-07-23

## 2022-07-22 RX ORDER — LORAZEPAM 1 MG/1
1 TABLET ORAL
Qty: 30 TABLET | Refills: 1 | Status: SHIPPED | OUTPATIENT
Start: 2022-07-22 | End: 2022-08-16 | Stop reason: SDUPTHER

## 2022-07-22 RX ADMIN — ALUMINUM HYDROXIDE, MAGNESIUM HYDROXIDE, AND SIMETHICONE 40 ML: 200; 200; 20 SUSPENSION ORAL at 21:54

## 2022-07-22 NOTE — TELEPHONE ENCOUNTER
From: Dileep Nolasco  To: Roya Gardiner NP  Sent: 7/22/2022 12:00 AM EDT  Subject: Lorazepam renewal request    Dear Elle Graves,   I have 6 Lorazepam tablets (1mg) left. Could you call in a new prescription to [facility] on [address] for me, please? So far I am sleepy within 1 hour of taking it. I also wanted to let you know that on Sept. 14 I will be having an endoscopy by Dr. Evelin Castrejon at [address] [place] [address] reason---I have a constant strange sweet breath that tastes unpleasant. Good thing we're wearing masks. LOL! I have been taking Omeprozole at the suggestion of Dr. Alessandra Fabry Nurse Practitioner, Elinaa Vaughn, and am almost through with the bottle. I was supposed to take famotidine at night but it wiped out the effectiveness of my Lorazepam.   Not only do I have this strange breath but my stomach feels as if it is being grabbed at times. Thank goodness it isn't all the time. I can't remember when it started, but I forgot to tell [address] about it when I went about my breath. I take so many different medications that that is probably why I have this strange grabbing feeling and strange breath. I am supposed to stop my coumadin on Sept. 7  Thank you.    Fredrick Waite

## 2022-07-23 VITALS
WEIGHT: 173 LBS | DIASTOLIC BLOOD PRESSURE: 66 MMHG | SYSTOLIC BLOOD PRESSURE: 178 MMHG | HEART RATE: 62 BPM | HEIGHT: 66 IN | TEMPERATURE: 98.6 F | BODY MASS INDEX: 27.8 KG/M2 | OXYGEN SATURATION: 100 % | RESPIRATION RATE: 18 BRPM

## 2022-07-23 PROCEDURE — 74011250637 HC RX REV CODE- 250/637: Performed by: EMERGENCY MEDICINE

## 2022-07-23 RX ADMIN — DICYCLOMINE HYDROCHLORIDE 40 MG: 10 CAPSULE ORAL at 00:09

## 2022-07-23 NOTE — ED TRIAGE NOTES
C/o of abd pain that stated 3hr pta  Upper epigastric, dull ache  Denies n/v     No s/s of distress obs. Skin warm and dry. Supposed to be taking nexium and pepcid.   Has not been taking pepcid becuause it causes ativan not to work

## 2022-07-23 NOTE — ED PROVIDER NOTES
C/o of abd pain that stated 3hr pta  Upper epigastric, dull ache  Denies n/v     No s/s of distress obs. Skin warm and dry. Supposed to be taking nexium and pepcid. Has not been taking pepcid becuause it causes ativan not to work    72-year-old female presenting ER with epigastric abdominal pain that started 3 hours prior to arrival.  Patient has history of GERD and is supposed be taking Pepcid however did not take it this evening she thought it interfered with her Xanax/Ativan. Patient denies any nausea or vomiting. Denies any chest pain or shortness of breath no fevers or chills no dysuria. Patient reports symptoms as a burning-like sensation. Past Medical History:   Diagnosis Date    Allergies     Anxiety     Arrhythmia     A-FIB    Arthritis     BACK AND \"EVERYWHERE\"    Diabetes ([facility] [address]     TYPE II    Diverticulosis     GERD (gastroesophageal reflux disease)     Impacted cerumen 8/10/2020    Internal hemorrhoids     Nausea & vomiting     Skin tags, anus or rectum     found during colonscopy     Sleep apnea     Sleep disorder     Thyroid disease     HYPO       Past Surgical History:   Procedure Laterality Date    HX BREAST LUMPECTOMY Right 11/30/2018    with removal of 5 lymph nodes    HX COLONOSCOPY  09/21/2018    HX DILATION AND CURETTAGE  10/2009    HX ENDOSCOPY  02/20/2017    normal     HX GI  02/20/2017    ENDOSCOPY    HX GI      COLONOSCOPY    HX GYN  1980'S    LAP FOR ENDOMETRIOSIS    HX KNEE REPLACEMENT Right 11/08/2008    HX LUMBAR LAMINECTOMY  2017    frontal/posterior laminectomy with fusion.      HX ORTHOPAEDIC Bilateral 2013, 2014    TRIGGER FINGER (RING FINGER)    HX ORTHOPAEDIC Bilateral 2005, 2015    BONE REMOVAL AND TENDON REPOSITIONING    HX ORTHOPAEDIC Right 08/05/2001    HAMMERTOE (4TH AND 5TH TOE)    HX ORTHOPAEDIC Right 03/2016    WRIST TENDON SNIPPING    HX TONSILLECTOMY  1953    IA CARDIAC SURG PROCEDURE UNLIST  06/06/2017    A-FIB ABLATION    IA CARDIAC SURG PROCEDURE UNLIST 2014, 2016    CARDIOVERSION 3X FOR A-FIB         Family History:   Problem Relation Age of Onset    Heart Disease Mother     Hypertension Father        Social History     Socioeconomic History    Marital status:      Spouse name: Not on file    Number of children: Not on file    Years of education: Not on file    Highest education level: Not on file   Occupational History    Not on file   Tobacco Use    Smoking status: Never    Smokeless tobacco: Never   Vaping Use    Vaping Use: Never used   Substance and Sexual Activity    Alcohol use: Not Currently    Drug use: No    Sexual activity: Not on file   Other Topics Concern    Not on file   Social History Narrative    Not on file     Social Determinants of Health     Financial Resource Strain: Not on file   Food Insecurity: Not on file   Transportation Needs: Not on file   Physical Activity: Not on file   Stress: Not on file   Social Connections: Not on file   Intimate Partner Violence: Not on file   Housing Stability: Not on file         ALLERGIES: Bactrim [sulfamethoprim]    Review of Systems   Constitutional:  Negative for chills and fever. HENT:  Negative for congestion and sore throat. Eyes:  Negative for pain. Respiratory:  Negative for shortness of breath. Cardiovascular:  Negative for chest pain. Gastrointestinal:  Positive for abdominal pain. Negative for diarrhea, nausea and vomiting. Genitourinary:  Negative for dysuria and flank pain. Musculoskeletal:  Negative for back pain and neck pain. Skin:  Negative for rash. Neurological:  Negative for dizziness and headaches. All other systems reviewed and are negative. Vitals:    07/22/22 2047 07/23/22 0000   BP: (!) 161/65 (!) 178/66   Pulse: 62    Resp: 20 18   Temp: 98.6 °F (37 °C)    SpO2: 98% 100%   Weight: 78.5 kg (173 lb)    Height: 5' 6\" (1.676 m)             Physical Exam  Constitutional:       Appearance: She is well-developed. HENT:      Head: Normocephalic.    Eyes: Conjunctiva/sclera: Conjunctivae normal.   Cardiovascular:      Rate and Rhythm: Normal rate and regular rhythm. Pulmonary:      Effort: Pulmonary effort is normal. No respiratory distress. Breath sounds: Normal breath sounds. Abdominal:      General: Abdomen is flat. Bowel sounds are normal.      Palpations: Abdomen is soft. Tenderness: There is abdominal tenderness in the epigastric area. Musculoskeletal:         General: Normal range of motion. Cervical back: Normal range of motion and neck supple. Skin:     General: Skin is warm. Capillary Refill: Capillary refill takes less than 2 seconds. Findings: No rash. Neurological:      Mental Status: She is alert and oriented to person, place, and time. Comments: No gross motor or sensory deficits        MDM  Number of Diagnoses or Management Options  Abdominal pain, epigastric  Gastroesophageal reflux disease, unspecified whether esophagitis present  Diagnosis management comments: Patient with epigastric abdominal pain. Has history of GERD. Did not take her Pepcid this evening. Labs unremarkable EKG unremarkable. Symptoms improved with GI cocktail    Discussed the discharge impression and any labs and the results with the patient. Answered any questions and addressed any concerns. Discussed the importance of following up with their primary care provider and/or specialist.  Discussed signs or symptoms that would warrant return back to the ER for further evaluation. The patient is agreeable with discharge. Amount and/or Complexity of Data Reviewed  Clinical lab tests: reviewed  Tests in the medicine section of CPT®: reviewed      ED Course as of 07/24/22 0009 Fri Jul 22, 2022   2250 EKG sinus bradycardia rate of 49 beats minute with normal intervals, axis. No ST elevation or depressions.   EKG interpreted by Singh Luong MD   [ZD]      ED Course User Index  [ZD] Ryanne Temple MD       Procedures      Recent Results (from the past 24 hour(s))   EKG, 12 LEAD, INITIAL    Collection Time: 07/22/22 10:50 PM   Result Value Ref Range    Ventricular Rate 49 BPM    Atrial Rate 49 BPM    P-R Interval 140 ms    QRS Duration 74 ms    Q-T Interval 438 ms    QTC Calculation (Bezet) 395 ms    Calculated P Axis 52 degrees    Calculated R Axis -11 degrees    Calculated T Axis 10 degrees    Diagnosis       Sinus bradycardia  Inferior infarct , age undetermined  Cannot rule out Anterior infarct , age undetermined  Abnormal ECG  No previous ECGs available     POC TROPONIN-I    Collection Time: 07/22/22 10:59 PM   Result Value Ref Range    Troponin-I (POC) <0.04 0.00 - 0.08 ng/mL   CBC WITH AUTOMATED DIFF    Collection Time: 07/22/22 11:00 PM   Result Value Ref Range    WBC 9.6 3.6 - 11.0 K/uL    RBC 4.23 3.80 - 5.20 M/uL    HGB 13.6 11.5 - 16.0 g/dL    HCT 40.9 35.0 - 47.0 %    MCV 96.7 80.0 - 99.0 FL    MCH 32.2 26.0 - 34.0 PG    MCHC 33.3 30.0 - 36.5 g/dL    RDW 12.8 11.5 - 14.5 %    PLATELET 293 307 - 737 K/uL    MPV 9.7 8.9 - 12.9 FL    NRBC 0.0 0  WBC    ABSOLUTE NRBC 0.00 0.00 - 0.01 K/uL    NEUTROPHILS 72 32 - 75 %    LYMPHOCYTES 16 12 - 49 %    MONOCYTES 9 5 - 13 %    EOSINOPHILS 2 0 - 7 %    BASOPHILS 1 0 - 1 %    IMMATURE GRANULOCYTES 0 0.0 - 0.5 %    ABS. NEUTROPHILS 6.9 1.8 - 8.0 K/UL    ABS. LYMPHOCYTES 1.5 0.8 - 3.5 K/UL    ABS. MONOCYTES 0.9 0.0 - 1.0 K/UL    ABS. EOSINOPHILS 0.2 0.0 - 0.4 K/UL    ABS. BASOPHILS 0.1 0.0 - 0.1 K/UL    ABS. IMM.  GRANS. 0.0 0.00 - 0.04 K/UL    DF AUTOMATED     METABOLIC PANEL, COMPREHENSIVE    Collection Time: 07/22/22 11:00 PM   Result Value Ref Range    Sodium 140 136 - 145 mmol/L    Potassium 4.0 3.5 - 5.1 mmol/L    Chloride 102 98 - 107 mmol/L    CO2 27 22 - 29 mmol/L    Anion gap 11 5 - 15 mmol/L    Glucose 110 (H) 65 - 100 mg/dL    BUN 14 8 - 23 MG/DL    Creatinine 0.53 0.50 - 0.90 MG/DL    BUN/Creatinine ratio 26 (H) 12 - 20      GFR est AA >60 >60 ml/min/1.73m2    GFR est non-AA >60 >60 ml/min/1.73m2    Calcium 10.0 8.8 - 10.2 MG/DL    Bilirubin, total 1.2 (H) 0.2 - 1.0 MG/DL    ALT (SGPT) 15 10 - 35 U/L    AST (SGOT) 28 10 - 35 U/L    Alk. phosphatase 107 (H) 35 - 104 U/L    Protein, total 7.3 6.4 - 8.3 g/dL    Albumin 4.7 3.5 - 5.2 g/dL    Globulin 2.6 2.0 - 4.0 g/dL    A-G Ratio 1.8 1.1 - 2.2     LIPASE    Collection Time: 07/22/22 11:00 PM   Result Value Ref Range    Lipase 30 13 - 60 U/L       No results found.

## 2022-07-25 LAB
ATRIAL RATE: 49 BPM
CALCULATED P AXIS, ECG09: 52 DEGREES
CALCULATED R AXIS, ECG10: -11 DEGREES
CALCULATED T AXIS, ECG11: 10 DEGREES
DIAGNOSIS, 93000: NORMAL
P-R INTERVAL, ECG05: 140 MS
Q-T INTERVAL, ECG07: 438 MS
QRS DURATION, ECG06: 74 MS
QTC CALCULATION (BEZET), ECG08: 395 MS
VENTRICULAR RATE, ECG03: 49 BPM

## 2022-08-16 ENCOUNTER — OFFICE VISIT (OUTPATIENT)
Dept: PRIMARY CARE CLINIC | Age: 77
End: 2022-08-16
Payer: MEDICARE

## 2022-08-16 VITALS
RESPIRATION RATE: 18 BRPM | SYSTOLIC BLOOD PRESSURE: 118 MMHG | WEIGHT: 172 LBS | HEIGHT: 66 IN | HEART RATE: 77 BPM | TEMPERATURE: 97.1 F | OXYGEN SATURATION: 98 % | DIASTOLIC BLOOD PRESSURE: 61 MMHG | BODY MASS INDEX: 27.64 KG/M2

## 2022-08-16 DIAGNOSIS — Z00.00 MEDICARE ANNUAL WELLNESS VISIT, SUBSEQUENT: ICD-10-CM

## 2022-08-16 DIAGNOSIS — F51.01 PRIMARY INSOMNIA: ICD-10-CM

## 2022-08-16 DIAGNOSIS — G57.92 NEUROPATHY OF LEFT LOWER EXTREMITY: Primary | ICD-10-CM

## 2022-08-16 DIAGNOSIS — E03.9 ACQUIRED HYPOTHYROIDISM: ICD-10-CM

## 2022-08-16 DIAGNOSIS — E11.40 TYPE 2 DIABETES MELLITUS WITH DIABETIC NEUROPATHY, WITHOUT LONG-TERM CURRENT USE OF INSULIN (HCC): ICD-10-CM

## 2022-08-16 PROCEDURE — 1123F ACP DISCUSS/DSCN MKR DOCD: CPT | Performed by: NURSE PRACTITIONER

## 2022-08-16 PROCEDURE — 99213 OFFICE O/P EST LOW 20 MIN: CPT | Performed by: NURSE PRACTITIONER

## 2022-08-16 PROCEDURE — G8427 DOCREV CUR MEDS BY ELIG CLIN: HCPCS | Performed by: NURSE PRACTITIONER

## 2022-08-16 PROCEDURE — G8536 NO DOC ELDER MAL SCRN: HCPCS | Performed by: NURSE PRACTITIONER

## 2022-08-16 PROCEDURE — 2022F DILAT RTA XM EVC RTNOPTHY: CPT | Performed by: NURSE PRACTITIONER

## 2022-08-16 PROCEDURE — G8432 DEP SCR NOT DOC, RNG: HCPCS | Performed by: NURSE PRACTITIONER

## 2022-08-16 PROCEDURE — G0439 PPPS, SUBSEQ VISIT: HCPCS | Performed by: NURSE PRACTITIONER

## 2022-08-16 PROCEDURE — 3044F HG A1C LEVEL LT 7.0%: CPT | Performed by: NURSE PRACTITIONER

## 2022-08-16 PROCEDURE — 1090F PRES/ABSN URINE INCON ASSESS: CPT | Performed by: NURSE PRACTITIONER

## 2022-08-16 PROCEDURE — 3017F COLORECTAL CA SCREEN DOC REV: CPT | Performed by: NURSE PRACTITIONER

## 2022-08-16 PROCEDURE — G8399 PT W/DXA RESULTS DOCUMENT: HCPCS | Performed by: NURSE PRACTITIONER

## 2022-08-16 PROCEDURE — 1101F PT FALLS ASSESS-DOCD LE1/YR: CPT | Performed by: NURSE PRACTITIONER

## 2022-08-16 PROCEDURE — G8417 CALC BMI ABV UP PARAM F/U: HCPCS | Performed by: NURSE PRACTITIONER

## 2022-08-16 RX ORDER — LEVOTHYROXINE SODIUM 88 UG/1
TABLET ORAL
Qty: 90 TABLET | Refills: 3 | Status: SHIPPED | OUTPATIENT
Start: 2022-08-16

## 2022-08-16 RX ORDER — GABAPENTIN 300 MG/1
CAPSULE ORAL
Qty: 90 CAPSULE | Refills: 1 | Status: SHIPPED | OUTPATIENT
Start: 2022-08-16

## 2022-08-16 RX ORDER — LORAZEPAM 1 MG/1
1.5 TABLET ORAL
Qty: 45 TABLET | Refills: 5 | Status: SHIPPED | OUTPATIENT
Start: 2022-08-16

## 2022-08-16 RX ORDER — METFORMIN HYDROCHLORIDE 500 MG/1
500 TABLET, EXTENDED RELEASE ORAL 2 TIMES DAILY
Qty: 180 TABLET | Refills: 2 | Status: SHIPPED | OUTPATIENT
Start: 2022-08-16

## 2022-08-16 NOTE — PROGRESS NOTES
Girtha Gosselin is a 78 y.o. female presents for Medicare wellness visit. This is a Subsequent Medicare Annual Wellness Visit (AWV), (Performed more than 12 months after effective date of Medicare Part B enrollment and 12 months after last preventive visit.)    I have reviewed the patient's medical history in detail and updated the computerized patient record. History obtained from: the patient. female  78 y.o. WHITE/NON-  Depression Risk Factor Screening:     3 most recent PHQ Screens 8/16/2022   PHQ Not Done -   Little interest or pleasure in doing things Not at all   Feeling down, depressed, irritable, or hopeless Not at all   Total Score PHQ 2 0   Trouble falling or staying asleep, or sleeping too much -   Feeling tired or having little energy -   Poor appetite, weight loss, or overeating -   Feeling bad about yourself - or that you are a failure or have let yourself or your family down -   Trouble concentrating on things such as school, work, reading, or watching TV -   Moving or speaking so slowly that other people could have noticed; or the opposite being so fidgety that others notice -   Thoughts of being better off dead, or hurting yourself in some way -   PHQ 9 Score -   How difficult have these problems made it for you to do your work, take care of your home and get along with others -     [address] [place] [address] 5/18/2021 7/22/2022   1) Within the past month, have you wished you were dead or wished you could go to sleep and not wake up? No No   2) Have you actually had any thoughts of killing yourself? No No   6) Have you ever done anything, started to do anything, or prepared to do anything to end your life? No No       Fall Risk Factor Screening:     Fall Risk Assessment, last 12 mths 8/16/2022   Able to walk? Yes   Fall in past 12 months? 1   Do you feel unsteady?  0   Are you worried about falling 0   Is the gait abnormal? -   Number of falls in past 12 months 2   Fall with injury? 1       Alcohol Risk Screen    Do you average more than 1 drink per night or more than 7 drinks a week:  No    On any one occasion in the past three months have you have had more than 3 drinks containing alcohol:  No         Functional Ability and Level of Safety:    Hearing: Hearing is good. Activities of Daily Living: The home contains: no safety equipment. Patient does total self care      Ambulation: with no difficulty      Abuse Screen:  Patient is not abused         History and Pertinent Exam   Patient is due for chronic medical conditions and/or has acute concerns in addition to the medicare wellness visit and agrees to do both, understanding there may be a copay for the additional services provided. Other Concerns today:      Insomnia:  Patient has been using lorazepam every night for sleep. Notes she has increased her dose on her own to 1.5 mg each night for sleep as 1 mg was not helping her with sleep. Patient reported that she does not want to try other medications. Has been given seroquel but states this makes her groggy and notes that she was offered temazepam but does not want to take this due to potential side effects. Diabetes: Last A1c was   Lab Results   Component Value Date/Time    Hemoglobin A1c 5.6 05/06/2022 08:33 AM    Hemoglobin A1c, External 5.5 06/29/2020 12:00 AM    . Diabetes well controlled on metformin  Patient's last eye exam was up to date. Notes neuropathy, uses gabapentin nightly to help with neuropathy in feet. Hypothyroidism:  Patient reported that her thyroid levels have been well controlled on synthroid. Patient denies any concerning symptoms. Health & Diet:   Please describe your diet habits: Regular diet  Do you get 5 servings of fruits or vegetables daily? yes  Do you exercise regularly? yes    Review of Systems   Constitutional:  Negative for malaise/fatigue and weight loss.    Eyes:  Negative for blurred vision and double vision. Respiratory:  Negative for shortness of breath. Cardiovascular:  Negative for chest pain and palpitations. Genitourinary:  Negative for frequency. Neurological:  Negative for dizziness, tingling, tremors and headaches. Endo/Heme/Allergies:  Negative for polydipsia. Psychiatric/Behavioral:  The patient has insomnia. The patient is not nervous/anxious. Reviewed PmHx, FmHx, SocHx as well as meds and allergies, updated and dated in the chart.     Patient Active Problem List   Diagnosis Code    Kyphoscoliosis M41.9    Impacted cerumen H61.20    JESSE (generalized anxiety disorder) F41.1    Neuropathy of left lower extremity G57.92    Diverticulosis K57.90    Skin tags, anus or rectum K64.4    Internal hemorrhoids K64.8    Synovitis of right ankle M65.9    Breast cancer, stage 2, right (HCC) C50.911    Primary insomnia F51.01    Sleep apnea G47.30    Type 2 diabetes mellitus with diabetic neuropathy E11.40    Acquired hypothyroidism E03.9    Paroxysmal atrial fibrillation (HCC) I48.0    Status post ablation of atrial fibrillation Z98.890, Z86.79    History of breast cancer Z85.3    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    Chronic anticoagulation Z79.01    Mixed stress and urge urinary incontinence N39.46     Past Medical History:   Diagnosis Date    Allergies     Anxiety     Arrhythmia     A-FIB    Arthritis     BACK AND \"EVERYWHERE\"    Diabetes (HCC)     TYPE II    Diverticulosis     GERD (gastroesophageal reflux disease)     Impacted cerumen 8/10/2020    Internal hemorrhoids     Nausea & vomiting     Skin tags, anus or rectum     found during colonscopy     Sleep apnea     Sleep disorder     Thyroid disease     HYPO      Past Surgical History:   Procedure Laterality Date    HX BREAST LUMPECTOMY Right 11/30/2018    with removal of 5 lymph nodes    HX COLONOSCOPY  09/21/2018    HX DILATION AND CURETTAGE  10/2009    HX ENDOSCOPY  02/20/2017    normal     HX GI  02/20/2017    ENDOSCOPY    HX GI COLONOSCOPY    HX GYN  1980'S    LAP FOR ENDOMETRIOSIS    HX KNEE REPLACEMENT Right 11/08/2008    HX LUMBAR LAMINECTOMY  2017    frontal/posterior laminectomy with fusion. HX ORTHOPAEDIC Bilateral 2013, 2014    TRIGGER FINGER (RING FINGER)    HX ORTHOPAEDIC Bilateral 2005, 2015    BONE REMOVAL AND TENDON REPOSITIONING    HX ORTHOPAEDIC Right 08/05/2001    HAMMERTOE (4TH AND 5TH TOE)    HX ORTHOPAEDIC Right 03/2016    WRIST TENDON SNIPPING    HX TONSILLECTOMY  1953    IA CARDIAC SURG PROCEDURE UNLIST  06/06/2017    A-FIB ABLATION    IA CARDIAC SURG PROCEDURE UNLIST  2014, 2016    CARDIOVERSION 3X FOR A-FIB     Allergies   Allergen Reactions    Bactrim [Sulfamethoprim] Swelling     \"LIP SWELLING\"     Current Outpatient Medications   Medication Sig Dispense Refill    gabapentin (NEURONTIN) 300 mg capsule TAKE ONE CAPSULE BY MOUTH EVERY EVENING 90 Capsule 1    LORazepam (ATIVAN) 1 mg tablet Take 1.5 Tablets by mouth nightly as needed for Anxiety. Max Daily Amount: 1.5 mg. 45 Tablet 5    metFORMIN ER (GLUCOPHAGE XR) 500 mg tablet Take 1 Tablet by mouth two (2) times a day. 180 Tablet 2    Synthroid 88 mcg tablet TAKE 1 TABLET DAILY BEFORE BREAKFAST FOR HYPOTHYROIDISM 90 Tablet 3    metoprolol succinate (TOPROL-XL) 25 mg XL tablet Take 0.5 Tablets by mouth daily. 90 Tablet 1    oxybutynin chloride XL (DITROPAN XL) 10 mg CR tablet Take 1 Tablet by mouth daily. 90 Tablet 1    lancets misc Use as directed to check BG. DX: E11.49 100 Each 3    glucose blood VI test strips (Accu-Chek Laura Plus test strp) strip Use as directed to check BG Daily. DX: E11.49 100 Strip 3    acetaminophen (TYLENOL) 500 mg tablet Tylenol Extra Strength 500 mg tablet   Take 2 tablets every 4 hours by oral route. cholecalciferol (VITAMIN D3) (1000 Units /25 mcg) tablet Take 2,000 Units by mouth daily. fexofenadine (ALLEGRA) 180 mg tablet Take 180 mg by mouth daily. letrozole (FEMARA) 2.5 mg tablet Take 2.5 mg by mouth daily. warfarin (COUMADIN) 5 mg tablet Take 5 mg by mouth daily. 2.5 mg on Tuesday and Thursdays and 5 mg other days       Family History   Problem Relation Age of Onset    Heart Disease Mother     Hypertension Father      Social History     Tobacco Use    Smoking status: Never    Smokeless tobacco: Never   Substance Use Topics    Alcohol use: Not Currently         BP Readings from Last 3 Encounters:   08/16/22 118/61   07/23/22 (!) 178/66   05/05/22 115/61      Wt Readings from Last 3 Encounters:   08/16/22 172 lb (78 kg)   07/22/22 173 lb (78.5 kg)   05/05/22 170 lb 9.6 oz (77.4 kg)     Body mass index is 27.76 kg/m². No results found. Physical Exam  Constitutional:       Appearance: Normal appearance. HENT:      Head: Normocephalic. Eyes:      Conjunctiva/sclera: Conjunctivae normal.   Cardiovascular:      Rate and Rhythm: Normal rate and regular rhythm. Pulses: Normal pulses. Heart sounds: Normal heart sounds. Pulmonary:      Effort: Pulmonary effort is normal.   Skin:     General: Skin is dry. Neurological:      Mental Status: She is alert and oriented to person, place, and time. Psychiatric:         Mood and Affect: Mood normal.         Behavior: Behavior normal.         Thought Content: Thought content normal.       Evaluation of Cognitive Function   Mood/affect:  neutral, happy  Orientation: Person, Place, Time and Situation  Appearance: age appropriate and casually dressed  Family member/caregiver input: None  Normal cognitive function  Specialists/Care Team   [employer].  Carter Bartlett has established care with the following healthcare providers:    Patient Care Team:  Jerel Rios NP as PCP - General (Nurse Practitioner)  Jerel Rios NP as PCP - [facility]  Jah Langley MD as Physician (Gastroenterology)  Quan Kasper MD as Physician (Cardiovascular Disease Physician)  Yuni Prieto MD as Physician (Hematology and Oncology)  Jerald Barkley MD as Physician (Cardiovascular Disease Physician)  Jl Jackson MD as Physician (Ophthalmology)  Bianca Ventura MD as Physician (Otolaryngology)      [address] [facility] Maintenance Topics with due status: Overdue       Topic Date Due    Pneumococcal 65+ years 03/16/2018    COVID-19 Vaccine 02/26/2022     Health Maintenance Topics with due status: Not Due       Topic Last Completion Date    Colorectal Cancer Screening Combo 09/21/2018    Eye Exam Retinal or Dilated 01/05/2021    DTaP/Tdap/Td series 03/12/2021    Flu Vaccine 09/25/2021    Depression Monitoring 05/05/2022    Foot Exam Q1 05/05/2022    A1C test (Diabetic or Prediabetic) 05/06/2022    MICROALBUMIN Q1 05/06/2022    Lipid Screen 05/06/2022    Medicare Yearly Exam 08/16/2022     Health Maintenance Topics with due status: Completed       Topic Last Completion Date    Shingrix Vaccine Age 50> 01/11/2019    Bone Densitometry (Dexa) Screening 02/08/2021         Colon cancer:  Recommendation: Colonoscopy every 10y or annual FIT test from 50-75 or every 3 year stool DNA based test with consideration of ongoing screening from 76-85. and Up to date or Completed    Lung cancer (LDCT): Recommendation: Yearly LDCT for pts 55-77 w 30-pack year hx and currently smoke or quit <15 yr ago. and Not Indicated    Hepatitis C:  Recommendation: One time screening for all patient's aged 18-79. and Not Indicated    Diabetes:   Recommendation: USPSTF recommends screening ages 38-69 y/o if overweight or obese.  Medicare covers screening in those patients who are overweight, obese, have HTN or dyslipiemia, a personal history of gestational diabetes or prior elevated blood sugar, a family hx of DM, or any patient over age 72 and Up to date or Completed    Lipids:   Recommendation: screening for hyperlipidemia every 5 years after age 39 and Up to date or Completed        PREVENTIVE CARE - FEMALE SCREENINGS     Cervical cancer: Recommendation: Every 3 yr from 21-29 and every 5 yr from 30-65, with Pap and HPV testing. and Not Indicated    Breast cancer: Recommendation:  USPSTF recommends screening mammography every 2 yr from 54-69. The decision to start screening mammography in women prior to age 48 years should be an individual one. Women who place a higher value on the potential benefit than the potential harms may choose to begin biennial screening between the ages of 36 and 52 years. Medicare covers annual screening mammography, USPSTF also recommends women with a personal or family history of breast, ovarian, tubal, or peritoneal cancer or who have an ancestry (Ashkenazi [employer]) associated with breast cancer susceptibility 1 and 2 (BRCA1/2) gene mutations with an appropriate brief familial risk assessment tool.  Women with a positive result on the risk assessment tool should receive genetic counseling and, if indicated after counseling, genetic testing and mammogram was done elsewhere, record not available    Osteoporosis: Recommendation: Screen all women at 72, earlier if elevated risk and Up to date or Completed    AAA:   Recommendation: One-time screening if family history of AAA and Not Indicated      IMMUNIZATIONS     Immunization History   Administered Date(s) Administered    COVID-19, PFIZER PURPLE top, DILUTE for use, (age 15 y+), IM, 30mcg/0.3mL 03/09/2021, 03/30/2021, 10/26/2021    DTaP 03/12/2021    Influenza High Dose Vaccine PF 09/25/2021    Influenza Vaccine 10/01/2020    Influenza, Quadrivalent, Adjuvanted (>65 Yrs FLUAD QUAD 90437) 10/07/2020    Pneumococcal Conjugate (PCV-13) 03/16/2017    Tdap 03/16/2017    Zoster Recombinant 10/23/2018, 01/11/2019       Pneumovax:   Recommendation: PPSV23 once for all >65 and high risk <65  and Up to date or Completed    Prevnar:   Recommendation: PCV13 only if >65 and immunocompromised or residing in a nursing home, or in areas of low childhood Pneumococcal vaccination and Not Indicated    Influenza:   Recommendation: Vaccination annually, high dose if 65 or older and Up to date or Completed    Shingrix:  Recommendation: Vaccination 2 shots 2-6 months apart for all age >47 and Up to date or Completed    TDaP:    Recommendation: Vaccination Booster with TDaP every 10 yr. and Up to date or Completed    Discussion of Advance Directive   Discussed with [employer]. Bharat Mayes her ability to prepare and advance directive in the case that an injury or illness causes her to be unable to make health care decisions. Date of ACP Conversation: 08/16/22  Persons included in Conversation:  patient  Length of ACP Conversation in minutes:  <16 minutes (Non-Billable)    Authorized Decision Maker (if patient is incapable of making informed decisions): This person is:   Named in Advance Directive or Healthcare Power of             For Patients with Decision Making Capacity:   Values/Goals: Exploration of values, goals, and preferences if recovery is not expected, even with continued medical treatment in the event of:  Imminent death  Severe, permanent brain injury    Conversation Outcomes / Follow-Up Plan:   ACP complete - no further action today    Assessment/Plan   V70.0,     Diagnoses and all orders for this visit:    1. Neuropathy of left lower extremity  Comments:  well controlled on gabapentin. Orders:  -     gabapentin (NEURONTIN) 300 mg capsule; TAKE ONE CAPSULE BY MOUTH EVERY EVENING    2. Primary insomnia  Comments:  not well controlled, has increased ativan to 1.5 mg. Hesitant for changes to regimen at this time. Discussed not increasing medication on her own in the futur  Orders:  -     LORazepam (ATIVAN) 1 mg tablet; Take 1.5 Tablets by mouth nightly as needed for Anxiety. Max Daily Amount: 1.5 mg.    3. Type 2 diabetes mellitus with diabetic neuropathy, without long-term current use of insulin ([facility]  Comments:  well controlled. Orders:  -     metFORMIN ER (GLUCOPHAGE XR) 500 mg tablet;  Take 1 Tablet by mouth two (2) times a day.    4. Acquired hypothyroidism  Comments:  well controlled on synthroid.    Orders:  -     Synthroid 88 mcg tablet; TAKE 1 TABLET DAILY BEFORE BREAKFAST FOR HYPOTHYROIDISM    5. Medicare annual wellness visit, subsequent      Last PDMP Compa as Reviewed:  Review User Review Instant Review Result   Yasmin Domenic 8/16/2022  3:44 PM Reviewed PDMP [1]         Cleveland Sebastian NP

## 2022-08-16 NOTE — PROGRESS NOTES
Chief Complaint   Patient presents with    Hospital Follow Up     Pt was in the hospital for stomach pains. Pt was seen at [facility] urgent care     Follow Up Chronic Condition     Pt is asking for refill on ativan. 1. Have you been to the ER, urgent care clinic since your last visit? Hospitalized since your last visit? No    2. Have you seen or consulted any other health care providers outside of the [address] [place] [address] since your last visit? Include any pap smears or colon screening.  No        Visit Vitals  /61 (BP 1 Location: Left arm, BP Patient Position: At rest, BP Cuff Size: Adult)   Pulse 77   Temp 97.1 °F (36.2 °C) (Temporal)   Resp 18   Ht 5' 6\" (1.676 m)   Wt 172 lb (78 kg)   SpO2 98%   BMI 27.76 kg/m²

## 2022-09-07 DIAGNOSIS — E11.40 TYPE 2 DIABETES MELLITUS WITH DIABETIC NEUROPATHY, WITHOUT LONG-TERM CURRENT USE OF INSULIN (HCC): ICD-10-CM

## 2022-09-07 RX ORDER — METFORMIN HYDROCHLORIDE 500 MG/1
TABLET, EXTENDED RELEASE ORAL
Qty: 180 TABLET | Refills: 2 | OUTPATIENT
Start: 2022-09-07

## 2022-09-10 LAB
INR PPP: 1.9 (ref 0.9–1.2)
PROTHROMBIN TIME: 19.1 SEC (ref 9.1–12)
SPECIMEN STATUS REPORT, ROLRST: NORMAL

## 2022-09-28 ENCOUNTER — PATIENT MESSAGE (OUTPATIENT)
Dept: PRIMARY CARE CLINIC | Age: 77
End: 2022-09-28

## 2022-09-28 DIAGNOSIS — Z23 ENCOUNTER FOR IMMUNIZATION: Primary | ICD-10-CM

## 2022-09-30 NOTE — TELEPHONE ENCOUNTER
----- Message from Howie Morfin sent at 9/28/2022  4:30 PM EDT -----  Subject: Message to Provider    QUESTIONS  Information for Provider? Patient requesting to schedule a Flu and   Pneumonia shot.  ---------------------------------------------------------------------------  --------------  Juan DUPONT  [phone]; OK to leave message on voicemail  ---------------------------------------------------------------------------  --------------  SCRIPT ANSWERS  Relationship to Patient?  Self

## 2022-10-03 ENCOUNTER — CLINICAL SUPPORT (OUTPATIENT)
Dept: PRIMARY CARE CLINIC | Age: 77
End: 2022-10-03
Payer: MEDICARE

## 2022-10-03 DIAGNOSIS — Z23 ENCOUNTER FOR IMMUNIZATION: Primary | ICD-10-CM

## 2022-10-03 PROCEDURE — G0008 ADMIN INFLUENZA VIRUS VAC: HCPCS | Performed by: NURSE PRACTITIONER

## 2022-10-03 PROCEDURE — 90694 VACC AIIV4 NO PRSRV 0.5ML IM: CPT | Performed by: NURSE PRACTITIONER

## 2022-10-28 ENCOUNTER — OFFICE VISIT (OUTPATIENT)
Dept: ENT CLINIC | Age: 77
End: 2022-10-28
Payer: MEDICARE

## 2022-10-28 VITALS
HEART RATE: 60 BPM | OXYGEN SATURATION: 99 % | HEIGHT: 66 IN | SYSTOLIC BLOOD PRESSURE: 120 MMHG | RESPIRATION RATE: 17 BRPM | BODY MASS INDEX: 27.64 KG/M2 | DIASTOLIC BLOOD PRESSURE: 78 MMHG | WEIGHT: 172 LBS

## 2022-10-28 DIAGNOSIS — H61.22 IMPACTED CERUMEN OF LEFT EAR: Primary | ICD-10-CM

## 2022-10-28 PROCEDURE — 69210 REMOVE IMPACTED EAR WAX UNI: CPT | Performed by: OTOLARYNGOLOGY

## 2022-10-28 NOTE — PROGRESS NOTES
Follow-up today for ear cleaning. She does wear hearing aids. Exam showing impacted cerumen AS. Procedure - Removal Impacted Cerumen    Indications: cerumen impaction    Ears are examined under microscope/headlight. Left ears are cleaned using otologic curette, suction, and/or alligator forceps. Ears cleaned bilaterally.   Follow-up 1 year

## 2023-02-10 DIAGNOSIS — G57.92 NEUROPATHY OF LEFT LOWER EXTREMITY: ICD-10-CM

## 2023-02-13 RX ORDER — GABAPENTIN 300 MG/1
CAPSULE ORAL
Qty: 90 CAPSULE | Refills: 0 | Status: SHIPPED | OUTPATIENT
Start: 2023-02-13

## 2023-02-13 RX ORDER — GABAPENTIN 300 MG/1
CAPSULE ORAL
Qty: 90 CAPSULE | OUTPATIENT
Start: 2023-02-13

## 2023-02-17 ENCOUNTER — VIRTUAL VISIT (OUTPATIENT)
Dept: PRIMARY CARE CLINIC | Age: 78
End: 2023-02-17
Payer: MEDICARE

## 2023-02-17 ENCOUNTER — TELEPHONE (OUTPATIENT)
Dept: PRIMARY CARE CLINIC | Age: 78
End: 2023-02-17

## 2023-02-17 DIAGNOSIS — G57.92 NEUROPATHY OF LEFT LOWER EXTREMITY: Primary | ICD-10-CM

## 2023-02-17 DIAGNOSIS — F51.01 PRIMARY INSOMNIA: ICD-10-CM

## 2023-02-17 RX ORDER — TRAZODONE HYDROCHLORIDE 50 MG/1
50 TABLET ORAL
Qty: 30 TABLET | Refills: 0 | Status: SHIPPED | OUTPATIENT
Start: 2023-03-03

## 2023-02-17 NOTE — PATIENT INSTRUCTIONS
Decrease ativan to ONE tablet nightly for two weeks followed by HALF A TABLET nightly for two weeks. After a month you will be weaned off. If you have any increased anxiety, insomnia, or issues of withdrawal, notify me immediately.

## 2023-02-17 NOTE — PROGRESS NOTES
Identified Patient with two Patient identifiers(name and ). 1. \"Have you been to the ER, urgent care clinic since your last visit? Hospitalized since your last visit? \" No    2. \"Have you seen or consulted any other health care providers outside of the [address] [place] [address] since your last visit? \"  Cardio  /Derm    3. For patients aged 39-70: Has the patient had a colonoscopy / FIT/ Cologuard? NA - based on age      If the patient is female:    4. For patients aged 41-77: Has the patient had a mammogram within the past 2 years? NA - based on age or sex      11. For patients aged 21-65: Has the patient had a pap smear? NA - based on age or sex     There were no vitals taken for this visit. No chief complaint on file.       Health Maintenance Due   Topic Date Due    Pneumococcal 65+ years (2 - PPSV23 if available, else PCV20) 2018    COVID-19 Vaccine (4 - Booster for Angel[employer] series) 2021    Eye Exam Retinal or Dilated  2023

## 2023-02-17 NOTE — PROGRESS NOTES
Jorge L Reyes (: 1945) is a 70 y.o. female, established patient, here for evaluation of the following chief complaint(s):     CC: refill and medication question       ASSESSMENT/PLAN:  Below is the assessment and plan developed based on review of pertinent history, labs, studies, and medications. Gabapentin already refilled. Patient would like to decrease ativan use. We will decrease ativan in slow increments every two weeks until weaned off. We will cut down to 1 tablet nightly for two weeks followed by 1/2 tablet weekly for two weeks. Potential withdrawal effects discussed. Patient verbalized understanding. We will initiate trazodone in two weeks. If she has significant insomnia before then, she will let me know. We will follow up closely in one month. 1. Neuropathy of left lower extremity  2. Primary insomnia    Return in about 1 month (around 3/17/2023) for F/U: Sleep. SUBJECTIVE/OBJECTIVE:  Patient's PCP is Ms Anny Chacon, CARLOS ALBERTO. She had back surgery  that resulted in paresthesias of foot, which gabapentin helps with. She needed refill which I recently sent in. Patient reports she uses ativan for sleep. She also takes gabapentin at night. This has been her regimen \"for years\". She wants to know if there are alternates to help with sleep instead of the ativan. Review of Systems   Respiratory:  Negative for cough and shortness of breath. Cardiovascular:  Negative for chest pain and palpitations. Psychiatric/Behavioral:  Positive for sleep disturbance. Negative for hallucinations and self-injury. No data recorded     Physical Exam  Vitals and nursing note reviewed. Constitutional:       General: She is not in acute distress. Pulmonary:      Effort: Pulmonary effort is normal. No respiratory distress. Neurological:      Mental Status: She is alert.    Psychiatric:         Mood and Affect: Mood normal.         Behavior: Behavior normal.       [facility] Gunderson [facility], was evaluated through a synchronous (real-time) audio-video encounter. The patient (or guardian if applicable) is aware that this is a billable service, which includes applicable co-pays. This Virtual Visit was conducted with patient's (and/or legal guardian's) consent. The visit was conducted pursuant to the emergency declaration under the [address] [place] [address] and the [employer] and [employer] Act. Patient identification was verified, and a caregiver was present when appropriate. The patient was located at: Home: [address] [place] [address]  The provider was located at[place] [facility]       An electronic signature was used to authenticate this note.   -- Nicci Sanchez MD

## 2023-03-08 DIAGNOSIS — F51.01 PRIMARY INSOMNIA: ICD-10-CM

## 2023-03-09 ENCOUNTER — PATIENT MESSAGE (OUTPATIENT)
Dept: PRIMARY CARE CLINIC | Age: 78
End: 2023-03-09

## 2023-03-09 RX ORDER — LORAZEPAM 1 MG/1
1.5 TABLET ORAL
Qty: 45 TABLET | Refills: 5 | OUTPATIENT
Start: 2023-03-09

## 2023-03-09 NOTE — TELEPHONE ENCOUNTER
At our appt a few weeks ago patient desired to wean off ativan and we discussed regimen to wean off ativan. She should not need refill. Pls call and clarify.

## 2023-03-10 NOTE — TELEPHONE ENCOUNTER
I called patient. We discussed her concerns. She had plans to consider switching to eliquis but is on coumadin. She has a history of SVT and ablation. I agree on holding the trazodone for now. I discussed with patient melatonin may also interfere with her coumadin, so we will hold on prescribing that. We could try temazepam which is safer long term for sleep. Patient says she has reduced to 1mg of ativan nightly and \"I feel better\" and she is sleeping well overall. She denies withdrawal symptoms including palpitations, tremors, increased agitation. She would like to hold on trying any sleep alternatives and try to continue to wean off ativan. She will decrease to 1/2 tablet after one more week and she will continue this for 2 weeks. After that she can d/c ativan or do 1/2 tablet every other day for 1-2 weeks. She will contact me in 1-2 weeks to inform me how she is feeling. She will also contact me immediately for any signs of withdrawal.    Pt has been advised of all alternatives and I stressed she does not have to go without sleep aids. The goal is for her to be on a healthy medication regimen that she is comfortable with and that controls her chronic conditions. That being said, I am in support of her desire to wean off the ativan.      Dr. Fernando Rubio

## 2023-05-24 ENCOUNTER — OFFICE VISIT (OUTPATIENT)
Dept: PRIMARY CARE CLINIC | Facility: CLINIC | Age: 78
End: 2023-05-24
Payer: MEDICARE

## 2023-05-24 VITALS
TEMPERATURE: 97.2 F | RESPIRATION RATE: 20 BRPM | SYSTOLIC BLOOD PRESSURE: 110 MMHG | BODY MASS INDEX: 28.16 KG/M2 | DIASTOLIC BLOOD PRESSURE: 70 MMHG | HEART RATE: 64 BPM | OXYGEN SATURATION: 98 % | HEIGHT: 66 IN | WEIGHT: 175.2 LBS

## 2023-05-24 DIAGNOSIS — C50.911 MALIGNANT NEOPLASM OF RIGHT FEMALE BREAST, UNSPECIFIED ESTROGEN RECEPTOR STATUS, UNSPECIFIED SITE OF BREAST (HCC): ICD-10-CM

## 2023-05-24 DIAGNOSIS — M81.0 AGE-RELATED OSTEOPOROSIS WITHOUT CURRENT PATHOLOGICAL FRACTURE: ICD-10-CM

## 2023-05-24 DIAGNOSIS — E89.0 POSTABLATIVE HYPOTHYROIDISM: ICD-10-CM

## 2023-05-24 DIAGNOSIS — I48.0 PAROXYSMAL ATRIAL FIBRILLATION (HCC): ICD-10-CM

## 2023-05-24 DIAGNOSIS — G57.92 UNSPECIFIED MONONEUROPATHY OF LEFT LOWER LIMB: ICD-10-CM

## 2023-05-24 DIAGNOSIS — E11.40 TYPE 2 DIABETES MELLITUS WITH DIABETIC NEUROPATHY, WITHOUT LONG-TERM CURRENT USE OF INSULIN (HCC): Primary | ICD-10-CM

## 2023-05-24 PROCEDURE — G8399 PT W/DXA RESULTS DOCUMENT: HCPCS | Performed by: NURSE PRACTITIONER

## 2023-05-24 PROCEDURE — 1036F TOBACCO NON-USER: CPT | Performed by: NURSE PRACTITIONER

## 2023-05-24 PROCEDURE — 1123F ACP DISCUSS/DSCN MKR DOCD: CPT | Performed by: NURSE PRACTITIONER

## 2023-05-24 PROCEDURE — G8427 DOCREV CUR MEDS BY ELIG CLIN: HCPCS | Performed by: NURSE PRACTITIONER

## 2023-05-24 PROCEDURE — G8419 CALC BMI OUT NRM PARAM NOF/U: HCPCS | Performed by: NURSE PRACTITIONER

## 2023-05-24 PROCEDURE — 99214 OFFICE O/P EST MOD 30 MIN: CPT | Performed by: NURSE PRACTITIONER

## 2023-05-24 PROCEDURE — 1090F PRES/ABSN URINE INCON ASSESS: CPT | Performed by: NURSE PRACTITIONER

## 2023-05-24 RX ORDER — OXYBUTYNIN CHLORIDE 10 MG/1
10 TABLET, EXTENDED RELEASE ORAL DAILY
COMMUNITY
Start: 2022-05-05

## 2023-05-24 RX ORDER — ALENDRONATE SODIUM 70 MG/1
TABLET ORAL
COMMUNITY
Start: 2023-04-28

## 2023-05-24 RX ORDER — WARFARIN SODIUM 5 MG/1
5 TABLET ORAL DAILY
COMMUNITY
Start: 2020-08-24

## 2023-05-24 RX ORDER — GABAPENTIN 300 MG/1
CAPSULE ORAL
Qty: 90 CAPSULE | Refills: 1 | Status: SHIPPED | OUTPATIENT
Start: 2023-05-24 | End: 2023-11-24

## 2023-05-24 RX ORDER — LETROZOLE 2.5 MG/1
2.5 TABLET, FILM COATED ORAL DAILY
COMMUNITY
Start: 2019-02-21

## 2023-05-24 RX ORDER — LORAZEPAM 1 MG/1
1.5 TABLET ORAL
COMMUNITY
Start: 2022-08-16 | End: 2023-05-24 | Stop reason: ALTCHOICE

## 2023-05-24 RX ORDER — METFORMIN HYDROCHLORIDE 500 MG/1
500 TABLET, EXTENDED RELEASE ORAL 2 TIMES DAILY
Qty: 180 TABLET | Refills: 1 | Status: SHIPPED | OUTPATIENT
Start: 2023-05-24

## 2023-05-24 RX ORDER — TRAZODONE HYDROCHLORIDE 50 MG/1
1 TABLET ORAL NIGHTLY
COMMUNITY
Start: 2023-03-03 | End: 2023-05-24 | Stop reason: ALTCHOICE

## 2023-05-24 RX ORDER — GABAPENTIN 300 MG/1
CAPSULE ORAL
COMMUNITY
Start: 2023-02-13 | End: 2023-05-24 | Stop reason: SDUPTHER

## 2023-05-24 RX ORDER — LANCETS
EACH MISCELLANEOUS
Qty: 100 EACH | Refills: 3 | Status: SHIPPED | OUTPATIENT
Start: 2023-05-24

## 2023-05-24 RX ORDER — BLOOD SUGAR DIAGNOSTIC
STRIP MISCELLANEOUS DAILY PRN
COMMUNITY
Start: 2018-03-14 | End: 2023-05-24 | Stop reason: SDUPTHER

## 2023-05-24 RX ORDER — FEXOFENADINE HCL 180 MG/1
180 TABLET ORAL DAILY
COMMUNITY

## 2023-05-24 RX ORDER — METOPROLOL SUCCINATE 25 MG/1
12.5 TABLET, EXTENDED RELEASE ORAL DAILY
COMMUNITY
Start: 2022-05-05

## 2023-05-24 RX ORDER — ACETAMINOPHEN 500 MG
TABLET ORAL
COMMUNITY

## 2023-05-24 RX ORDER — BLOOD SUGAR DIAGNOSTIC
STRIP MISCELLANEOUS
Qty: 100 EACH | Refills: 3 | Status: SHIPPED | OUTPATIENT
Start: 2023-05-24

## 2023-05-24 RX ORDER — LEVOTHYROXINE SODIUM 88 UG/1
TABLET ORAL
COMMUNITY
Start: 2022-08-16 | End: 2023-07-25

## 2023-05-24 RX ORDER — LANCETS 30 GAUGE
EACH MISCELLANEOUS
COMMUNITY
Start: 2021-03-16 | End: 2023-05-24

## 2023-05-24 RX ORDER — METFORMIN HYDROCHLORIDE 500 MG/1
500 TABLET, EXTENDED RELEASE ORAL 2 TIMES DAILY
COMMUNITY
Start: 2022-08-16 | End: 2023-05-24 | Stop reason: SDUPTHER

## 2023-05-24 SDOH — ECONOMIC STABILITY: INCOME INSECURITY: HOW HARD IS IT FOR YOU TO PAY FOR THE VERY BASICS LIKE FOOD, HOUSING, MEDICAL CARE, AND HEATING?: NOT HARD AT ALL

## 2023-05-24 SDOH — ECONOMIC STABILITY: HOUSING INSECURITY
IN THE LAST 12 MONTHS, WAS THERE A TIME WHEN YOU DID NOT HAVE A STEADY PLACE TO SLEEP OR SLEPT IN A SHELTER (INCLUDING NOW)?: NO

## 2023-05-24 SDOH — ECONOMIC STABILITY: FOOD INSECURITY: WITHIN THE PAST 12 MONTHS, YOU WORRIED THAT YOUR FOOD WOULD RUN OUT BEFORE YOU GOT MONEY TO BUY MORE.: NEVER TRUE

## 2023-05-24 SDOH — ECONOMIC STABILITY: FOOD INSECURITY: WITHIN THE PAST 12 MONTHS, THE FOOD YOU BOUGHT JUST DIDN'T LAST AND YOU DIDN'T HAVE MONEY TO GET MORE.: NEVER TRUE

## 2023-05-24 ASSESSMENT — PATIENT HEALTH QUESTIONNAIRE - PHQ9
1. LITTLE INTEREST OR PLEASURE IN DOING THINGS: 0
2. FEELING DOWN, DEPRESSED OR HOPELESS: 0
SUM OF ALL RESPONSES TO PHQ QUESTIONS 1-9: 0
SUM OF ALL RESPONSES TO PHQ9 QUESTIONS 1 & 2: 0

## 2023-05-24 ASSESSMENT — ENCOUNTER SYMPTOMS
CHEST TIGHTNESS: 0
SHORTNESS OF BREATH: 0

## 2023-05-24 NOTE — PROGRESS NOTES
Large Adult)   Pulse 64   Temp 97.2 °F (36.2 °C) (Temporal)   Resp 20   Ht 5' 6\" (1.676 m)   Wt 175 lb 3.2 oz (79.5 kg)   SpO2 98%   BMI 28.28 kg/m²

## 2023-05-24 NOTE — PROGRESS NOTES
Status post ablation of atrial fibrillation    History of breast cancer    Postablative hypothyroidism    Chronic anticoagulation    Mixed stress and urge urinary incontinence    Age-related osteoporosis without current pathological fracture     Patient Active Problem List    Diagnosis Date Noted    Age-related osteoporosis without current pathological fracture 05/24/2023    Acquired hypothyroidism 05/05/2022    Paroxysmal atrial fibrillation ([facility] 05/05/2022    Status post ablation of atrial fibrillation 05/05/2022    History of breast cancer 05/05/2022    Postablative hypothyroidism 05/05/2022    Chronic anticoagulation 05/05/2022    Mixed stress and urge urinary incontinence 05/05/2022    Type 2 diabetes mellitus with diabetic neuropathy ([facility] 08/05/2021    Sleep apnea     Primary insomnia 05/18/2021    Malignant neoplasm of right female breast ([facility] 05/18/2021    Diverticulosis     Internal hemorrhoids     Skin tags, anus or rectum     MIGUELINA (generalized anxiety disorder) 09/08/2020    Impacted cerumen 08/10/2020    Neuropathy of left lower extremity 02/26/2019    Kyphoscoliosis 11/15/2017    Synovitis of right ankle 09/26/2017     Current Outpatient Medications   Medication Sig Dispense Refill    acetaminophen (TYLENOL) 500 MG tablet Tylenol Extra Strength 500 mg tablet   Take 2 tablets every 4 hours by oral route.       vitamin D (CHOLECALCIFEROL) 25 MCG (1000 UT) TABS tablet Take 2 tablets by mouth daily      fexofenadine (ALLEGRA) 180 MG tablet Take 1 tablet by mouth daily      letrozole (FEMARA) 2.5 MG tablet Take 1 tablet by mouth daily      levothyroxine (SYNTHROID) 88 MCG tablet TAKE 1 TABLET DAILY BEFORE BREAKFAST FOR HYPOTHYROIDISM      metoprolol succinate (TOPROL XL) 25 MG extended release tablet Take 0.5 tablets by mouth daily      oxybutynin (DITROPAN-XL) 10 MG extended release tablet Take 1 tablet by mouth daily      warfarin (COUMADIN) 5 MG tablet Take 1 tablet by mouth daily      alendronate

## 2023-05-25 LAB
25(OH)D3+25(OH)D2 SERPL-MCNC: 60.6 NG/ML (ref 30–100)
ALBUMIN SERPL-MCNC: 4.6 G/DL (ref 3.7–4.7)
ALBUMIN/GLOB SERPL: 2.2 {RATIO} (ref 1.2–2.2)
ALP SERPL-CCNC: 92 IU/L (ref 44–121)
ALT SERPL-CCNC: 16 IU/L (ref 0–32)
AST SERPL-CCNC: 27 IU/L (ref 0–40)
BILIRUB SERPL-MCNC: 1 MG/DL (ref 0–1.2)
BUN SERPL-MCNC: 14 MG/DL (ref 8–27)
BUN/CREAT SERPL: 22 (ref 12–28)
CALCIUM SERPL-MCNC: 9.2 MG/DL (ref 8.7–10.3)
CHLORIDE SERPL-SCNC: 104 MMOL/L (ref 96–106)
CHOLEST SERPL-MCNC: 155 MG/DL (ref 100–199)
CO2 SERPL-SCNC: 22 MMOL/L (ref 20–29)
CREAT SERPL-MCNC: 0.64 MG/DL (ref 0.57–1)
EGFRCR SERPLBLD CKD-EPI 2021: 92 ML/MIN/1.73
ERYTHROCYTE [DISTWIDTH] IN BLOOD BY AUTOMATED COUNT: 12.1 % (ref 11.7–15.4)
GLOBULIN SER CALC-MCNC: 2.1 G/DL (ref 1.5–4.5)
GLUCOSE SERPL-MCNC: 99 MG/DL (ref 70–99)
HBA1C MFR BLD: 5.5 % (ref 4.8–5.6)
HCT VFR BLD AUTO: 41.5 % (ref 34–46.6)
HDLC SERPL-MCNC: 71 MG/DL
HGB BLD-MCNC: 14 G/DL (ref 11.1–15.9)
LDLC SERPL CALC-MCNC: 66 MG/DL (ref 0–99)
MCH RBC QN AUTO: 31.7 PG (ref 26.6–33)
MCHC RBC AUTO-ENTMCNC: 33.7 G/DL (ref 31.5–35.7)
MCV RBC AUTO: 94 FL (ref 79–97)
PLATELET # BLD AUTO: 287 X10E3/UL (ref 150–450)
POTASSIUM SERPL-SCNC: 4.2 MMOL/L (ref 3.5–5.2)
PROT SERPL-MCNC: 6.7 G/DL (ref 6–8.5)
RBC # BLD AUTO: 4.41 X10E6/UL (ref 3.77–5.28)
SODIUM SERPL-SCNC: 141 MMOL/L (ref 134–144)
T4 FREE SERPL-MCNC: 1.25 NG/DL (ref 0.82–1.77)
TRIGL SERPL-MCNC: 102 MG/DL (ref 0–149)
TSH SERPL DL<=0.005 MIU/L-ACNC: 0.96 UIU/ML (ref 0.45–4.5)
VLDLC SERPL CALC-MCNC: 18 MG/DL (ref 5–40)
WBC # BLD AUTO: 5.7 X10E3/UL (ref 3.4–10.8)

## 2023-07-25 RX ORDER — LEVOTHYROXINE SODIUM 88 MCG
TABLET ORAL
Qty: 90 TABLET | Refills: 0 | Status: SHIPPED | OUTPATIENT
Start: 2023-07-25

## 2023-08-10 ENCOUNTER — OFFICE VISIT (OUTPATIENT)
Dept: PRIMARY CARE CLINIC | Facility: CLINIC | Age: 78
End: 2023-08-10
Payer: MEDICARE

## 2023-08-10 VITALS
HEART RATE: 69 BPM | BODY MASS INDEX: 28.57 KG/M2 | OXYGEN SATURATION: 98 % | HEIGHT: 66 IN | RESPIRATION RATE: 18 BRPM | SYSTOLIC BLOOD PRESSURE: 136 MMHG | TEMPERATURE: 98.4 F | DIASTOLIC BLOOD PRESSURE: 70 MMHG | WEIGHT: 177.8 LBS

## 2023-08-10 DIAGNOSIS — M25.521 RIGHT ELBOW PAIN: ICD-10-CM

## 2023-08-10 DIAGNOSIS — J02.9 SORE THROAT: Primary | ICD-10-CM

## 2023-08-10 DIAGNOSIS — M25.571 ACUTE RIGHT ANKLE PAIN: ICD-10-CM

## 2023-08-10 LAB
GROUP A STREP ANTIGEN, POC: NEGATIVE
VALID INTERNAL CONTROL, POC: NORMAL

## 2023-08-10 PROCEDURE — 1123F ACP DISCUSS/DSCN MKR DOCD: CPT | Performed by: NURSE PRACTITIONER

## 2023-08-10 PROCEDURE — 99213 OFFICE O/P EST LOW 20 MIN: CPT | Performed by: NURSE PRACTITIONER

## 2023-08-10 PROCEDURE — 87880 STREP A ASSAY W/OPTIC: CPT | Performed by: NURSE PRACTITIONER

## 2023-08-10 ASSESSMENT — ENCOUNTER SYMPTOMS
EYES NEGATIVE: 1
SORE THROAT: 1
SINUS PAIN: 0
GASTROINTESTINAL NEGATIVE: 1
COUGH: 1
ROS SKIN COMMENTS: BRUISING

## 2023-08-10 NOTE — PROGRESS NOTES
Salvador Villarreal is a 70 y.o. female presents for    Chief Complaint   Patient presents with    Other     Sore throat x 1 week now improving, started with cold symptoms on 7/5 and treated with steroids and Benzonatate, Doxycycline; COVID neg, Xray done neg. Also wants to discuss bruises on arm and right thigh area and lump on elbow Right from a fall x2. S/P vein ablation. .    ASSESSMENT and PLAN  Samantha Rogel was seen today for other. Diagnoses and all orders for this visit:    Sore throat  Comments:  strep neg. discussed warm salt water gargles and OTC tx. increase fluid intake. rest. offered COVID testing she declined. RTC if no improvement or sx worsen. Orders:  -     AMB POC RAPID STREP A    Right elbow pain  Comments:  Bruising and swelling to right elbow; no tenderness. Recommend ice and elevation. Offered xray, pt declined. RTC if no improvement. Acute right ankle pain  Comments:  from fall; ice/elevation/compression. RTC if sx do not improve         HISTORY OF PRESENT ILLNESS  Salvador Villarreal is a 70 y.o. female presents for    Chief Complaint   Patient presents with    Other     Sore throat x 1 week now improving, started with cold symptoms on 7/5 and treated with steroids and Benzonatate, Doxycycline; COVID neg, Xray done neg. Also wants to discuss bruises on arm and right thigh area and lump on elbow Right from a fall x2. S/P vein ablation. .    Patient reports sore throat x 1 week. States it is starting to feel better now after starting OTC sucrets. Denies fever, ear pain, sinus pain. Reports cough. She did have a cold in the beginning of July as well. [facility]. Was on cough suppressant and antibiotics. She also fell last week. She tripped in her kitchen and landed on her right side. She has a bruise to her right arm and elbow. Hurt her right foot as well and has noticed some swelling to her right ankle. She is ambulating with her cane.
a spouse who is currently employed? No, Not  (single, , )    Thank you for answering this questionnaire.

## 2023-08-29 ENCOUNTER — TELEPHONE (OUTPATIENT)
Dept: PRIMARY CARE CLINIC | Facility: CLINIC | Age: 78
End: 2023-08-29

## 2023-08-29 NOTE — TELEPHONE ENCOUNTER
----- Message from Eloise White sent at 8/23/2023  3:07 PM EDT -----  Subject: Message to Provider    QUESTIONS  Information for Provider? patient called and would like to know if you   have the flu shot available and if not when you might be getting, she has   an appointment 09/26 and wants to know if she can get it at that   appointment   ---------------------------------------------------------------------------  --------------  Lakeisha Marine Kym  [phone]; OK to leave message on voicemail  ---------------------------------------------------------------------------  --------------  SCRIPT ANSWERS  Relationship to Patient?  Self

## 2023-08-30 ENCOUNTER — OFFICE VISIT (OUTPATIENT)
Age: 78
End: 2023-08-30
Payer: MEDICARE

## 2023-08-30 VITALS
HEART RATE: 62 BPM | OXYGEN SATURATION: 97 % | WEIGHT: 177 LBS | SYSTOLIC BLOOD PRESSURE: 118 MMHG | DIASTOLIC BLOOD PRESSURE: 70 MMHG | HEIGHT: 66 IN | BODY MASS INDEX: 28.45 KG/M2

## 2023-08-30 DIAGNOSIS — J31.0 RHINITIS, UNSPECIFIED TYPE: Primary | ICD-10-CM

## 2023-08-30 DIAGNOSIS — J01.90 ACUTE VIRAL SINUSITIS: ICD-10-CM

## 2023-08-30 DIAGNOSIS — B97.89 ACUTE VIRAL SINUSITIS: ICD-10-CM

## 2023-08-30 PROCEDURE — G8419 CALC BMI OUT NRM PARAM NOF/U: HCPCS | Performed by: NURSE PRACTITIONER

## 2023-08-30 PROCEDURE — G8427 DOCREV CUR MEDS BY ELIG CLIN: HCPCS | Performed by: NURSE PRACTITIONER

## 2023-08-30 PROCEDURE — 1123F ACP DISCUSS/DSCN MKR DOCD: CPT | Performed by: NURSE PRACTITIONER

## 2023-08-30 PROCEDURE — 1090F PRES/ABSN URINE INCON ASSESS: CPT | Performed by: NURSE PRACTITIONER

## 2023-08-30 PROCEDURE — G8399 PT W/DXA RESULTS DOCUMENT: HCPCS | Performed by: NURSE PRACTITIONER

## 2023-08-30 PROCEDURE — 1036F TOBACCO NON-USER: CPT | Performed by: NURSE PRACTITIONER

## 2023-08-30 PROCEDURE — 99213 OFFICE O/P EST LOW 20 MIN: CPT | Performed by: NURSE PRACTITIONER

## 2023-08-30 RX ORDER — FLUTICASONE PROPIONATE 50 MCG
2 SPRAY, SUSPENSION (ML) NASAL DAILY
Qty: 16 G | Refills: 0 | Status: SHIPPED | OUTPATIENT
Start: 2023-08-30

## 2023-08-30 ASSESSMENT — ENCOUNTER SYMPTOMS
GASTROINTESTINAL NEGATIVE: 1
RESPIRATORY NEGATIVE: 1
SORE THROAT: 1
EYES NEGATIVE: 1
ALLERGIC/IMMUNOLOGIC NEGATIVE: 1

## 2023-08-30 NOTE — PROGRESS NOTES
Subjective:    [place]   70 y.o.   1945     Followup Visit  This is a 70 y.o. female who is here today to discuss issues with sore throat. Location - sinuses, ears, throat     Quality - pain and pressure     Severity -  mild to moderate     Duration - one month     Timing - constant     Context - She states that starting in July she was seen in urgent care and was given medrol dose pack and tessalon. She completed this course and was not any better. She went back to urgent care and was given doxycycline, which afterwards she was not completely better. She felt as though she was underwater and had some pressure in her ears. A few days alter she developed a sore throat. She has now had the sore throat for about 2 weeks. She has been gargling with salt water and states this has helped, but she still has drainage. Modifying Features -     Associated symptoms/signs -       Review of Systems  Review of Systems   Constitutional:  Positive for fatigue. HENT:  Positive for congestion and sore throat. Eyes: Negative. Respiratory: Negative. Cardiovascular: Negative. Gastrointestinal: Negative. Endocrine: Negative. Genitourinary: Negative. Musculoskeletal: Negative. Skin: Negative. Allergic/Immunologic: Negative. Neurological: Negative. Hematological: Negative. Psychiatric/Behavioral: Negative.            Past Medical History:   Diagnosis Date    Allergies     Anticoagulant long-term use 2009    Anxiety     Arrhythmia     A-FIB    Arthritis     BACK AND \"EVERYWHERE\"    Atrial fibrillation ([address]) 2009    Cancer ([address]) 2018    Chronic back pain 2012    Diabetes ([address])     TYPE II    Diverticulosis     GERD (gastroesophageal reflux disease)     Hearing loss 2019    Hypothyroidism 1980    Impacted cerumen 8/10/2020    Internal hemorrhoids     Irritable bowel syndrome 2018    Nausea & vomiting     Neuropathy 2017    Obesity 2018    Osteoarthritis 1995    Peripheral vascular

## 2023-09-05 ENCOUNTER — TELEPHONE (OUTPATIENT)
Age: 78
End: 2023-09-05

## 2023-09-05 NOTE — TELEPHONE ENCOUNTER
Patient called stating she believes the fluticasone  has caused AFIB the last two nights. She wants to know if the medication can be changed to an alternative.      [phone]

## 2023-09-27 ENCOUNTER — PATIENT MESSAGE (OUTPATIENT)
Dept: PRIMARY CARE CLINIC | Facility: CLINIC | Age: 78
End: 2023-09-27

## 2023-09-27 DIAGNOSIS — E11.40 TYPE 2 DIABETES MELLITUS WITH DIABETIC NEUROPATHY, WITHOUT LONG-TERM CURRENT USE OF INSULIN (HCC): Primary | ICD-10-CM

## 2023-09-27 RX ORDER — LANCETS
EACH MISCELLANEOUS
Qty: 100 EACH | Refills: 3 | Status: SHIPPED | OUTPATIENT
Start: 2023-09-27

## 2023-09-27 NOTE — TELEPHONE ENCOUNTER
From: Eda Gutierrez  To: Jayro Martinez  Sent: 9/27/2023 3:29 PM EDT  Subject: multi-clix situation    Keron Michele,   I need a new multi-clix prescription. I am down to my last multi-clix cartridge that fits in my Accu Chek Leigh Plus pen. I have ended up with 2 scripts for softclix that I cannot use with dates 3/25/21 and 5/26/23. May I have a prescription for the above pen, please? I don't understand why I didn't tell you earlier except that I must have had plenty of cartridges at that time. I can see how I forgot about the 3/25/21 box because of so much going on then with my  and I had a fall, but why I didn't correct the 5/26/23 one is a mystery unless I thought I still had some more cartridges or was relying on my bad habit of re-using them. An enigma of procrastination!!!! Do you know anyone who needs that particular type of softclix? I can bring them to you 11/29 when I come for my wellness visit. Thanks.

## 2023-11-21 ENCOUNTER — OFFICE VISIT (OUTPATIENT)
Age: 78
End: 2023-11-21
Payer: MEDICARE

## 2023-11-21 VITALS
HEART RATE: 48 BPM | OXYGEN SATURATION: 98 % | SYSTOLIC BLOOD PRESSURE: 116 MMHG | BODY MASS INDEX: 28.73 KG/M2 | WEIGHT: 178.8 LBS | HEIGHT: 66 IN | DIASTOLIC BLOOD PRESSURE: 72 MMHG

## 2023-11-21 DIAGNOSIS — H61.22 IMPACTED CERUMEN, LEFT EAR: Primary | ICD-10-CM

## 2023-11-21 PROCEDURE — 69210 REMOVE IMPACTED EAR WAX UNI: CPT | Performed by: OTOLARYNGOLOGY

## 2023-11-21 NOTE — PROGRESS NOTES
Follow-up today for ear cleaning. She does wear hearing aids. Exam showing impacted cerumen AS       Procedure - Removal Impacted Cerumen    Indications: cerumen impaction    Ears are examined under microscope/headlight. Left ears are cleaned using otologic curette, suction, and/or alligator forceps. Ears cleaned.   Follow-up 1 year

## 2023-11-26 SDOH — HEALTH STABILITY: PHYSICAL HEALTH: ON AVERAGE, HOW MANY DAYS PER WEEK DO YOU ENGAGE IN MODERATE TO STRENUOUS EXERCISE (LIKE A BRISK WALK)?: 2 DAYS

## 2023-11-26 SDOH — HEALTH STABILITY: PHYSICAL HEALTH: ON AVERAGE, HOW MANY MINUTES DO YOU ENGAGE IN EXERCISE AT THIS LEVEL?: 20 MIN

## 2023-11-26 ASSESSMENT — LIFESTYLE VARIABLES
HOW OFTEN DO YOU HAVE A DRINK CONTAINING ALCOHOL: 2-4 TIMES A MONTH
HOW MANY STANDARD DRINKS CONTAINING ALCOHOL DO YOU HAVE ON A TYPICAL DAY: 1 OR 2

## 2023-11-26 ASSESSMENT — PATIENT HEALTH QUESTIONNAIRE - PHQ9
SUM OF ALL RESPONSES TO PHQ QUESTIONS 1-9: 0
SUM OF ALL RESPONSES TO PHQ QUESTIONS 1-9: 0
SUM OF ALL RESPONSES TO PHQ9 QUESTIONS 1 & 2: 0
1. LITTLE INTEREST OR PLEASURE IN DOING THINGS: 0
2. FEELING DOWN, DEPRESSED OR HOPELESS: 0
SUM OF ALL RESPONSES TO PHQ QUESTIONS 1-9: 0
SUM OF ALL RESPONSES TO PHQ QUESTIONS 1-9: 0

## 2023-11-28 SDOH — HEALTH STABILITY: PHYSICAL HEALTH: ON AVERAGE, HOW MANY DAYS PER WEEK DO YOU ENGAGE IN MODERATE TO STRENUOUS EXERCISE (LIKE A BRISK WALK)?: 2 DAYS

## 2023-11-28 SDOH — HEALTH STABILITY: PHYSICAL HEALTH: ON AVERAGE, HOW MANY MINUTES DO YOU ENGAGE IN EXERCISE AT THIS LEVEL?: 20 MIN

## 2023-11-28 ASSESSMENT — PATIENT HEALTH QUESTIONNAIRE - PHQ9
SUM OF ALL RESPONSES TO PHQ QUESTIONS 1-9: 0
SUM OF ALL RESPONSES TO PHQ QUESTIONS 1-9: 0
SUM OF ALL RESPONSES TO PHQ9 QUESTIONS 1 & 2: 0
2. FEELING DOWN, DEPRESSED OR HOPELESS: 0
1. LITTLE INTEREST OR PLEASURE IN DOING THINGS: 0
SUM OF ALL RESPONSES TO PHQ QUESTIONS 1-9: 0
SUM OF ALL RESPONSES TO PHQ QUESTIONS 1-9: 0

## 2023-11-28 ASSESSMENT — LIFESTYLE VARIABLES
HOW OFTEN DO YOU HAVE A DRINK CONTAINING ALCOHOL: 3
HOW OFTEN DO YOU HAVE A DRINK CONTAINING ALCOHOL: 2-4 TIMES A MONTH
HOW OFTEN DO YOU HAVE SIX OR MORE DRINKS ON ONE OCCASION: 1
HOW MANY STANDARD DRINKS CONTAINING ALCOHOL DO YOU HAVE ON A TYPICAL DAY: 1 OR 2
HOW MANY STANDARD DRINKS CONTAINING ALCOHOL DO YOU HAVE ON A TYPICAL DAY: 1

## 2023-11-29 ENCOUNTER — OFFICE VISIT (OUTPATIENT)
Dept: PRIMARY CARE CLINIC | Facility: CLINIC | Age: 78
End: 2023-11-29
Payer: MEDICARE

## 2023-11-29 VITALS
HEART RATE: 56 BPM | OXYGEN SATURATION: 98 % | DIASTOLIC BLOOD PRESSURE: 60 MMHG | BODY MASS INDEX: 28.45 KG/M2 | TEMPERATURE: 97.9 F | RESPIRATION RATE: 16 BRPM | HEIGHT: 66 IN | SYSTOLIC BLOOD PRESSURE: 117 MMHG | WEIGHT: 177 LBS

## 2023-11-29 DIAGNOSIS — E55.9 VITAMIN D DEFICIENCY: ICD-10-CM

## 2023-11-29 DIAGNOSIS — M81.0 AGE-RELATED OSTEOPOROSIS WITHOUT CURRENT PATHOLOGICAL FRACTURE: ICD-10-CM

## 2023-11-29 DIAGNOSIS — I10 ESSENTIAL HYPERTENSION: Chronic | ICD-10-CM

## 2023-11-29 DIAGNOSIS — E03.9 ACQUIRED HYPOTHYROIDISM: Chronic | ICD-10-CM

## 2023-11-29 DIAGNOSIS — E11.40 TYPE 2 DIABETES MELLITUS WITH DIABETIC NEUROPATHY, WITHOUT LONG-TERM CURRENT USE OF INSULIN (HCC): ICD-10-CM

## 2023-11-29 DIAGNOSIS — G57.92 UNSPECIFIED MONONEUROPATHY OF LEFT LOWER LIMB: ICD-10-CM

## 2023-11-29 DIAGNOSIS — Z00.00 MEDICARE ANNUAL WELLNESS VISIT, SUBSEQUENT: Primary | ICD-10-CM

## 2023-11-29 PROCEDURE — 99214 OFFICE O/P EST MOD 30 MIN: CPT | Performed by: NURSE PRACTITIONER

## 2023-11-29 PROCEDURE — 1036F TOBACCO NON-USER: CPT | Performed by: NURSE PRACTITIONER

## 2023-11-29 PROCEDURE — G8419 CALC BMI OUT NRM PARAM NOF/U: HCPCS | Performed by: NURSE PRACTITIONER

## 2023-11-29 PROCEDURE — 1090F PRES/ABSN URINE INCON ASSESS: CPT | Performed by: NURSE PRACTITIONER

## 2023-11-29 PROCEDURE — G8427 DOCREV CUR MEDS BY ELIG CLIN: HCPCS | Performed by: NURSE PRACTITIONER

## 2023-11-29 PROCEDURE — 1123F ACP DISCUSS/DSCN MKR DOCD: CPT | Performed by: NURSE PRACTITIONER

## 2023-11-29 PROCEDURE — G8399 PT W/DXA RESULTS DOCUMENT: HCPCS | Performed by: NURSE PRACTITIONER

## 2023-11-29 PROCEDURE — 3074F SYST BP LT 130 MM HG: CPT | Performed by: NURSE PRACTITIONER

## 2023-11-29 PROCEDURE — G8484 FLU IMMUNIZE NO ADMIN: HCPCS | Performed by: NURSE PRACTITIONER

## 2023-11-29 PROCEDURE — 3078F DIAST BP <80 MM HG: CPT | Performed by: NURSE PRACTITIONER

## 2023-11-29 PROCEDURE — G0439 PPPS, SUBSEQ VISIT: HCPCS | Performed by: NURSE PRACTITIONER

## 2023-11-29 PROCEDURE — 3044F HG A1C LEVEL LT 7.0%: CPT | Performed by: NURSE PRACTITIONER

## 2023-11-29 RX ORDER — ASCORBIC ACID 500 MG
500 TABLET ORAL DAILY
COMMUNITY

## 2023-11-29 RX ORDER — LINACLOTIDE 290 UG/1
CAPSULE, GELATIN COATED ORAL
COMMUNITY
End: 2023-11-29

## 2023-11-29 RX ORDER — METFORMIN HYDROCHLORIDE 500 MG/1
500 TABLET, EXTENDED RELEASE ORAL 2 TIMES DAILY
Qty: 180 TABLET | Refills: 1 | Status: SHIPPED | OUTPATIENT
Start: 2023-11-29 | End: 2023-11-29 | Stop reason: SDUPTHER

## 2023-11-29 RX ORDER — METFORMIN HYDROCHLORIDE 500 MG/1
500 TABLET, EXTENDED RELEASE ORAL 2 TIMES DAILY
Qty: 180 TABLET | Refills: 1 | Status: SHIPPED | OUTPATIENT
Start: 2023-11-29

## 2023-11-29 RX ORDER — LEVOTHYROXINE SODIUM 88 MCG
TABLET ORAL
Qty: 90 TABLET | Refills: 1 | Status: SHIPPED | OUTPATIENT
Start: 2023-11-29

## 2023-11-29 RX ORDER — GABAPENTIN 300 MG/1
CAPSULE ORAL
Qty: 90 CAPSULE | Refills: 1 | Status: SHIPPED | OUTPATIENT
Start: 2023-11-29 | End: 2024-05-31

## 2023-11-29 RX ORDER — FLECAINIDE ACETATE 50 MG/1
TABLET ORAL
COMMUNITY
Start: 2023-11-25

## 2023-11-29 RX ORDER — METOPROLOL SUCCINATE 25 MG/1
12.5 TABLET, EXTENDED RELEASE ORAL DAILY
Qty: 30 TABLET | Refills: 0 | Status: SHIPPED | OUTPATIENT
Start: 2023-11-29

## 2023-11-29 RX ORDER — IBANDRONATE SODIUM 150 MG/1
TABLET, FILM COATED ORAL
COMMUNITY
Start: 2023-11-14

## 2023-11-29 NOTE — PROGRESS NOTES
Medicare Annual Wellness Visit    Leo Anna is here for Medicare AWV    Assessment & Plan   Medicare annual wellness visit, subsequent  Acquired hypothyroidism  Comments:  stable on synthroid. Orders:  -     SYNTHROID 88 MCG tablet; TAKE 1 TABLET DAILY BEFORE BREAKFAST FOR HYPOTHYROIDISM, Disp-90 tablet, R-1, DAWPlease wait to fill until patient requests medicationNormal  -     TSH  Unspecified mononeuropathy of left lower limb  Comments:  well controlled on gabapentin at night. uses cane for ambulation  Orders:  -     gabapentin (NEURONTIN) 300 MG capsule; TAKE ONE CAPSULE BY MOUTH EVERY EVENING, Disp-90 capsule, R-1Normal  Type 2 diabetes mellitus with diabetic neuropathy, without long-term current use of insulin ([address])  Comments:  checking labs today. she is interested in GLP1 at anytime if diabetes not well controlled however by glucose readings BG is stable  Orders:  -     metFORMIN (GLUCOPHAGE-XR) 500 MG extended release tablet; Take 1 tablet by mouth 2 times daily, Disp-180 tablet, R-1Normal  -     Microalbumin / Creatinine Urine Ratio  -     Hemoglobin A1C  -     Lipid Panel  -     CBC with Auto Differential  -     Comprehensive Metabolic Panel  Essential hypertension  Comments:  stable on metoprolol. Uses this PRN when BP is elevated  Orders:  -     metoprolol succinate (TOPROL XL) 25 MG extended release tablet; Take 0.5 tablets by mouth daily, Disp-30 tablet, R-0Normal  Vitamin D deficiency  -     Vitamin D 25 Hydroxy  Age-related osteoporosis without current pathological fracture  Comments:  she will message when she needs refills of boniva. Dexa scan under media tab  Recommendations for Preventive Services Due: see orders and patient instructions/AVS.  Recommended screening schedule for the next 5-10 years is provided to the patient in written form: see Patient Instructions/AVS.     Return in 6 months (on 5/29/2024) for Chronic OV.      Subjective   The following acute and/or chronic problems

## 2023-12-02 LAB
25(OH)D3+25(OH)D2 SERPL-MCNC: 57.4 NG/ML (ref 30–100)
ALBUMIN SERPL-MCNC: 4.7 G/DL (ref 3.8–4.8)
ALBUMIN/CREAT UR: 8 MG/G CREAT (ref 0–29)
ALBUMIN/GLOB SERPL: 2.1 {RATIO} (ref 1.2–2.2)
ALP SERPL-CCNC: 93 IU/L (ref 44–121)
ALT SERPL-CCNC: 10 IU/L (ref 0–32)
AST SERPL-CCNC: 20 IU/L (ref 0–40)
BASOPHILS # BLD AUTO: 0.1 X10E3/UL (ref 0–0.2)
BASOPHILS NFR BLD AUTO: 1 %
BILIRUB SERPL-MCNC: 0.9 MG/DL (ref 0–1.2)
BUN SERPL-MCNC: 14 MG/DL (ref 8–27)
BUN/CREAT SERPL: 21 (ref 12–28)
CALCIUM SERPL-MCNC: 9 MG/DL (ref 8.7–10.3)
CHLORIDE SERPL-SCNC: 103 MMOL/L (ref 96–106)
CHOLEST SERPL-MCNC: 158 MG/DL (ref 100–199)
CO2 SERPL-SCNC: 25 MMOL/L (ref 20–29)
CREAT SERPL-MCNC: 0.66 MG/DL (ref 0.57–1)
CREAT UR-MCNC: 87.9 MG/DL
EGFRCR SERPLBLD CKD-EPI 2021: 91 ML/MIN/1.73
EOSINOPHIL # BLD AUTO: 0.3 X10E3/UL (ref 0–0.4)
EOSINOPHIL NFR BLD AUTO: 6 %
ERYTHROCYTE [DISTWIDTH] IN BLOOD BY AUTOMATED COUNT: 12.3 % (ref 11.7–15.4)
GLOBULIN SER CALC-MCNC: 2.2 G/DL (ref 1.5–4.5)
GLUCOSE SERPL-MCNC: 86 MG/DL (ref 70–99)
HBA1C MFR BLD: 5.5 % (ref 4.8–5.6)
HCT VFR BLD AUTO: 42 % (ref 34–46.6)
HDLC SERPL-MCNC: 72 MG/DL
HGB BLD-MCNC: 14.2 G/DL (ref 11.1–15.9)
IMM GRANULOCYTES # BLD AUTO: 0 X10E3/UL (ref 0–0.1)
IMM GRANULOCYTES NFR BLD AUTO: 0 %
LDLC SERPL CALC-MCNC: 64 MG/DL (ref 0–99)
LYMPHOCYTES # BLD AUTO: 1.2 X10E3/UL (ref 0.7–3.1)
LYMPHOCYTES NFR BLD AUTO: 23 %
MCH RBC QN AUTO: 32.1 PG (ref 26.6–33)
MCHC RBC AUTO-ENTMCNC: 33.8 G/DL (ref 31.5–35.7)
MCV RBC AUTO: 95 FL (ref 79–97)
MICROALBUMIN UR-MCNC: 6.9 UG/ML
MONOCYTES # BLD AUTO: 0.6 X10E3/UL (ref 0.1–0.9)
MONOCYTES NFR BLD AUTO: 12 %
NEUTROPHILS # BLD AUTO: 3 X10E3/UL (ref 1.4–7)
NEUTROPHILS NFR BLD AUTO: 58 %
PLATELET # BLD AUTO: 281 X10E3/UL (ref 150–450)
POTASSIUM SERPL-SCNC: 4.2 MMOL/L (ref 3.5–5.2)
PROT SERPL-MCNC: 6.9 G/DL (ref 6–8.5)
RBC # BLD AUTO: 4.42 X10E6/UL (ref 3.77–5.28)
SODIUM SERPL-SCNC: 142 MMOL/L (ref 134–144)
TRIGL SERPL-MCNC: 127 MG/DL (ref 0–149)
TSH SERPL DL<=0.005 MIU/L-ACNC: 1.1 UIU/ML (ref 0.45–4.5)
VLDLC SERPL CALC-MCNC: 22 MG/DL (ref 5–40)
WBC # BLD AUTO: 5.2 X10E3/UL (ref 3.4–10.8)

## 2024-05-27 SDOH — ECONOMIC STABILITY: TRANSPORTATION INSECURITY
IN THE PAST 12 MONTHS, HAS LACK OF TRANSPORTATION KEPT YOU FROM MEETINGS, WORK, OR FROM GETTING THINGS NEEDED FOR DAILY LIVING?: NO

## 2024-05-27 SDOH — ECONOMIC STABILITY: INCOME INSECURITY: HOW HARD IS IT FOR YOU TO PAY FOR THE VERY BASICS LIKE FOOD, HOUSING, MEDICAL CARE, AND HEATING?: NOT VERY HARD

## 2024-05-27 SDOH — ECONOMIC STABILITY: FOOD INSECURITY: WITHIN THE PAST 12 MONTHS, YOU WORRIED THAT YOUR FOOD WOULD RUN OUT BEFORE YOU GOT MONEY TO BUY MORE.: NEVER TRUE

## 2024-05-27 SDOH — ECONOMIC STABILITY: FOOD INSECURITY: WITHIN THE PAST 12 MONTHS, THE FOOD YOU BOUGHT JUST DIDN'T LAST AND YOU DIDN'T HAVE MONEY TO GET MORE.: NEVER TRUE

## 2024-05-29 ENCOUNTER — OFFICE VISIT (OUTPATIENT)
Dept: PRIMARY CARE CLINIC | Facility: CLINIC | Age: 79
End: 2024-05-29
Payer: MEDICARE

## 2024-05-29 VITALS
SYSTOLIC BLOOD PRESSURE: 109 MMHG | WEIGHT: 179 LBS | TEMPERATURE: 97.7 F | OXYGEN SATURATION: 99 % | BODY MASS INDEX: 28.89 KG/M2 | DIASTOLIC BLOOD PRESSURE: 64 MMHG | HEART RATE: 67 BPM

## 2024-05-29 DIAGNOSIS — E03.9 ACQUIRED HYPOTHYROIDISM: Chronic | ICD-10-CM

## 2024-05-29 DIAGNOSIS — I48.0 PAROXYSMAL ATRIAL FIBRILLATION (HCC): ICD-10-CM

## 2024-05-29 DIAGNOSIS — C50.911 MALIGNANT NEOPLASM OF RIGHT FEMALE BREAST, UNSPECIFIED ESTROGEN RECEPTOR STATUS, UNSPECIFIED SITE OF BREAST (HCC): ICD-10-CM

## 2024-05-29 DIAGNOSIS — M81.0 AGE-RELATED OSTEOPOROSIS WITHOUT CURRENT PATHOLOGICAL FRACTURE: ICD-10-CM

## 2024-05-29 DIAGNOSIS — E11.40 TYPE 2 DIABETES MELLITUS WITH DIABETIC NEUROPATHY, WITHOUT LONG-TERM CURRENT USE OF INSULIN (HCC): ICD-10-CM

## 2024-05-29 DIAGNOSIS — R19.7 DIARRHEA, UNSPECIFIED TYPE: Primary | ICD-10-CM

## 2024-05-29 DIAGNOSIS — G57.92 UNSPECIFIED MONONEUROPATHY OF LEFT LOWER LIMB: ICD-10-CM

## 2024-05-29 PROCEDURE — G8427 DOCREV CUR MEDS BY ELIG CLIN: HCPCS | Performed by: NURSE PRACTITIONER

## 2024-05-29 PROCEDURE — 1090F PRES/ABSN URINE INCON ASSESS: CPT | Performed by: NURSE PRACTITIONER

## 2024-05-29 PROCEDURE — 1036F TOBACCO NON-USER: CPT | Performed by: NURSE PRACTITIONER

## 2024-05-29 PROCEDURE — G8419 CALC BMI OUT NRM PARAM NOF/U: HCPCS | Performed by: NURSE PRACTITIONER

## 2024-05-29 PROCEDURE — 1123F ACP DISCUSS/DSCN MKR DOCD: CPT | Performed by: NURSE PRACTITIONER

## 2024-05-29 PROCEDURE — 99214 OFFICE O/P EST MOD 30 MIN: CPT | Performed by: NURSE PRACTITIONER

## 2024-05-29 PROCEDURE — G8399 PT W/DXA RESULTS DOCUMENT: HCPCS | Performed by: NURSE PRACTITIONER

## 2024-05-29 RX ORDER — GABAPENTIN 300 MG/1
CAPSULE ORAL
Qty: 90 CAPSULE | Refills: 1 | Status: SHIPPED | OUTPATIENT
Start: 2024-05-29 | End: 2024-11-29

## 2024-05-29 RX ORDER — LEVOTHYROXINE SODIUM 88 MCG
TABLET ORAL
Qty: 90 TABLET | Refills: 1 | Status: SHIPPED | OUTPATIENT
Start: 2024-05-29

## 2024-05-29 RX ORDER — METFORMIN HYDROCHLORIDE 500 MG/1
500 TABLET, EXTENDED RELEASE ORAL 2 TIMES DAILY
Qty: 180 TABLET | Refills: 1 | Status: SHIPPED | OUTPATIENT
Start: 2024-05-29

## 2024-05-29 RX ORDER — LANOLIN ALCOHOL/MO/W.PET/CERES
1000 CREAM (GRAM) TOPICAL DAILY
COMMUNITY

## 2024-05-29 ASSESSMENT — ENCOUNTER SYMPTOMS
SHORTNESS OF BREATH: 0
CHEST TIGHTNESS: 0

## 2024-05-29 NOTE — PROGRESS NOTES
Chief Complaint   Patient presents with    Diarrhea     Pt states she has had for past 6mths        /64 (Site: Left Upper Arm, Position: Sitting, Cuff Size: Medium Adult)   Pulse 67   Temp 97.7 °F (36.5 °C) (Oral)   Wt 81.2 kg (179 lb)   SpO2 99%   BMI 28.89 kg/m²     Pain Scale: 0 - No pain/10  Pain Location:      Current Outpatient Medications on File Prior to Visit   Medication Sig Dispense Refill    vitamin B-12 (CYANOCOBALAMIN) 1000 MCG tablet Take 1 tablet by mouth daily      METAMUCIL FIBER PO Take by mouth daily as needed      flecainide (TAMBOCOR) 50 MG tablet       ibandronate (BONIVA) 150 MG tablet Taken the 10th of month      vitamin C (ASCORBIC ACID) 500 MG tablet Take 1 tablet by mouth daily      SYNTHROID 88 MCG tablet TAKE 1 TABLET DAILY BEFORE BREAKFAST FOR HYPOTHYROIDISM 90 tablet 1    gabapentin (NEURONTIN) 300 MG capsule TAKE ONE CAPSULE BY MOUTH EVERY EVENING 90 capsule 1    metFORMIN (GLUCOPHAGE-XR) 500 MG extended release tablet Take 1 tablet by mouth 2 times daily 180 tablet 1    metoprolol succinate (TOPROL XL) 25 MG extended release tablet Take 0.5 tablets by mouth daily (Patient taking differently: Take 0.5 tablets by mouth as needed) 30 tablet 0    acetaminophen (TYLENOL) 500 MG tablet Tylenol Extra Strength 500 mg tablet   Take 2 tablets every 4 hours by oral route.      vitamin D (CHOLECALCIFEROL) 25 MCG (1000 UT) TABS tablet Take 2 tablets by mouth daily      fexofenadine (ALLEGRA) 180 MG tablet Take 1 tablet by mouth daily Prn      warfarin (COUMADIN) 5 MG tablet Take 1 tablet by mouth daily      methylPREDNISolone (MEDROL, PATRICIO,) 4 MG tablet Take by mouth. 1 kit 0    letrozole (FEMARA) 2.5 MG tablet Take 1 tablet by mouth daily       No current facility-administered medications on file prior to visit.       Health Maintenance Due   Topic    COVID-19 Vaccine (6 - 2023-24 season)       1. \"Have you been to the ER, urgent care clinic since your last visit?  Hospitalized since 
allergies.   Neurological:  Negative for dizziness, weakness, light-headedness and headaches.   Psychiatric/Behavioral:  Negative for sleep disturbance. The patient is not nervous/anxious.           Objective:     Vitals:    05/29/24 1102   BP: 109/64   Site: Left Upper Arm   Position: Sitting   Cuff Size: Medium Adult   Pulse: 67   Temp: 97.7 °F (36.5 °C)   TempSrc: Oral   SpO2: 99%   Weight: 81.2 kg (179 lb)      Body mass index is 28.89 kg/m².     Physical Exam  Constitutional:       Appearance: Normal appearance. She is obese.   HENT:      Head: Normocephalic.   Eyes:      Conjunctiva/sclera: Conjunctivae normal.   Cardiovascular:      Rate and Rhythm: Normal rate and regular rhythm.      Pulses: Normal pulses.      Heart sounds: Normal heart sounds.   Pulmonary:      Effort: Pulmonary effort is normal.      Breath sounds: Normal breath sounds.   Abdominal:      General: Bowel sounds are normal.      Palpations: Abdomen is soft.      Tenderness: There is no abdominal tenderness.   Musculoskeletal:      Cervical back: Normal range of motion.   Skin:     General: Skin is warm and dry.   Neurological:      Mental Status: She is alert and oriented to person, place, and time.   Psychiatric:         Mood and Affect: Mood normal.         Behavior: Behavior normal.          Allergies   Allergen Reactions    Sulfamethoxazole-Trimethoprim Swelling     \"LIP SWELLING\"    Tylenol [Acetaminophen] Itching     Still uses since she can't use NSAIDs.       Current Outpatient Medications   Medication Sig Dispense Refill    vitamin B-12 (CYANOCOBALAMIN) 1000 MCG tablet Take 1 tablet by mouth daily      METAMUCIL FIBER PO Take by mouth daily as needed      gabapentin (NEURONTIN) 300 MG capsule TAKE ONE CAPSULE BY MOUTH EVERY EVENING 90 capsule 1    metFORMIN (GLUCOPHAGE-XR) 500 MG extended release tablet Take 1 tablet by mouth 2 times daily 180 tablet 1    SYNTHROID 88 MCG tablet TAKE 1 TABLET DAILY BEFORE BREAKFAST FOR

## 2024-05-30 LAB
25(OH)D3+25(OH)D2 SERPL-MCNC: 51.6 NG/ML (ref 30–100)
ALBUMIN SERPL-MCNC: 4.5 G/DL (ref 3.8–4.8)
ALBUMIN/GLOB SERPL: 2.1 {RATIO} (ref 1.2–2.2)
ALP SERPL-CCNC: 70 IU/L (ref 44–121)
ALT SERPL-CCNC: 11 IU/L (ref 0–32)
AST SERPL-CCNC: 23 IU/L (ref 0–40)
BILIRUB SERPL-MCNC: 1.1 MG/DL (ref 0–1.2)
BUN SERPL-MCNC: 13 MG/DL (ref 8–27)
BUN/CREAT SERPL: 22 (ref 12–28)
CALCIUM SERPL-MCNC: 9.4 MG/DL (ref 8.7–10.3)
CHLORIDE SERPL-SCNC: 102 MMOL/L (ref 96–106)
CHOLEST SERPL-MCNC: 149 MG/DL (ref 100–199)
CO2 SERPL-SCNC: 22 MMOL/L (ref 20–29)
CREAT SERPL-MCNC: 0.6 MG/DL (ref 0.57–1)
EGFRCR SERPLBLD CKD-EPI 2021: 92 ML/MIN/1.73
ERYTHROCYTE [DISTWIDTH] IN BLOOD BY AUTOMATED COUNT: 12.4 % (ref 11.7–15.4)
GLOBULIN SER CALC-MCNC: 2.1 G/DL (ref 1.5–4.5)
GLUCOSE SERPL-MCNC: 90 MG/DL (ref 70–99)
HBA1C MFR BLD: 5.5 % (ref 4.8–5.6)
HCT VFR BLD AUTO: 40.9 % (ref 34–46.6)
HDLC SERPL-MCNC: 74 MG/DL
HGB BLD-MCNC: 13.5 G/DL (ref 11.1–15.9)
LDLC SERPL CALC-MCNC: 53 MG/DL (ref 0–99)
MCH RBC QN AUTO: 31.8 PG (ref 26.6–33)
MCHC RBC AUTO-ENTMCNC: 33 G/DL (ref 31.5–35.7)
MCV RBC AUTO: 97 FL (ref 79–97)
PLATELET # BLD AUTO: 311 X10E3/UL (ref 150–450)
POTASSIUM SERPL-SCNC: 4.5 MMOL/L (ref 3.5–5.2)
PROT SERPL-MCNC: 6.6 G/DL (ref 6–8.5)
RBC # BLD AUTO: 4.24 X10E6/UL (ref 3.77–5.28)
SODIUM SERPL-SCNC: 144 MMOL/L (ref 134–144)
T4 FREE SERPL-MCNC: 1.51 NG/DL (ref 0.82–1.77)
TRIGL SERPL-MCNC: 132 MG/DL (ref 0–149)
TSH SERPL DL<=0.005 MIU/L-ACNC: 0.86 UIU/ML (ref 0.45–4.5)
VLDLC SERPL CALC-MCNC: 22 MG/DL (ref 5–40)
WBC # BLD AUTO: 5.2 X10E3/UL (ref 3.4–10.8)

## 2024-05-31 LAB
ALPHA-GAL IGE QN: <0.1 KU/L
BEEF IGE QN: <0.1 KU/L
IGE SERPL-ACNC: 55 IU/ML (ref 6–495)
LAMB IGE QN: <0.1 KU/L
Lab: NORMAL
PORK IGE QN: <0.1 KU/L

## 2024-06-10 NOTE — PROGRESS NOTES
Follow-up today for ear cleaning. Patient last seen in August 2020. She does wear hearing aids. Exam showing impacted cerumen bilaterally. Procedure - Removal Impacted Cerumen    Indications: cerumen impaction    Ears are examined under microscope/headlight.  bilateral ears are cleaned using otologic curette, suction, and/or alligator forceps. Ears cleaned bilaterally.   Follow-up 1 year
Visit Vitals  /72 (BP 1 Location: Left upper arm, BP Patient Position: Sitting, BP Cuff Size: Adult)   Pulse 64   Temp 98.4 °F (36.9 °C) (Temporal)   Resp 18   Ht 5' 6\" (1.676 m)   Wt 174 lb (78.9 kg)   SpO2 98%   BMI 28.08 kg/m²     Chief Complaint   Patient presents with    Follow-up     ear cleaning
Statement Selected

## 2024-07-12 ENCOUNTER — PATIENT MESSAGE (OUTPATIENT)
Dept: PRIMARY CARE CLINIC | Facility: CLINIC | Age: 79
End: 2024-07-12

## 2024-07-12 DIAGNOSIS — E03.9 ACQUIRED HYPOTHYROIDISM: Chronic | ICD-10-CM

## 2024-07-15 RX ORDER — LEVOTHYROXINE SODIUM 88 MCG
TABLET ORAL
Qty: 90 TABLET | Refills: 1 | Status: SHIPPED | OUTPATIENT
Start: 2024-07-15

## 2024-07-15 NOTE — TELEPHONE ENCOUNTER
From: Dona Padilla  To: Andres Rodriguez  Sent: 7/12/2024 9:12 PM EDT  Subject: Synthroid request    Fina Campos,   I'm emailing you to request that you send a prescription renewal for my Synthroid to [facility] in [place], [place]. Some time ago I asked them to stop sending them until I was ready again. Now I have about 20 pills left so I am ready.   Thank you.  Dona Padilla

## 2024-08-09 ENCOUNTER — PATIENT MESSAGE (OUTPATIENT)
Dept: PRIMARY CARE CLINIC | Facility: CLINIC | Age: 79
End: 2024-08-09

## 2024-08-09 DIAGNOSIS — M81.0 AGE-RELATED OSTEOPOROSIS WITHOUT CURRENT PATHOLOGICAL FRACTURE: Primary | ICD-10-CM

## 2024-08-09 PROBLEM — Z96.659 PAIN DUE TO TOTAL KNEE REPLACEMENT (HCC): Status: ACTIVE | Noted: 2024-08-09

## 2024-08-09 PROBLEM — T84.84XA PAIN DUE TO TOTAL KNEE REPLACEMENT (HCC): Status: ACTIVE | Noted: 2024-08-09

## 2024-08-09 RX ORDER — IBANDRONATE SODIUM 150 MG/1
150 TABLET, FILM COATED ORAL
Qty: 3 TABLET | Refills: 1 | Status: SHIPPED | OUTPATIENT
Start: 2024-08-09 | End: 2025-02-05

## 2024-08-09 NOTE — TELEPHONE ENCOUNTER
From: Dona Padilla  To: Andres Rodriguez  Sent: 8/9/2024 1:53 PM EDT  Subject: pre-surgery visit and renew scrip    Andres,   After 3 bouts with my left knee resulting in swelling, incapacitating pain and trouble walking, I have decided to go ahead and let Dr. Coughlin fix the problem by making an incision and inserting a cushion October 2. Sisi said I will need to make an appointment with my PCP. So can you tell me what to do? Do I go ahead and make the appointmt with Adry or Sheila for you? I didn't see an option for my reason to see you.   Also I took my last Ibandronate Sodium caplet this morning and there are no refills on the box so could you send a refill request to [facility] please? Thank you.

## 2024-09-14 ENCOUNTER — HOSPITAL ENCOUNTER (EMERGENCY)
Facility: HOSPITAL | Age: 79
Discharge: HOME | End: 2024-09-14
Attending: EMERGENCY MEDICINE
Payer: MEDICARE

## 2024-09-14 VITALS
HEART RATE: 49 BPM | RESPIRATION RATE: 17 BRPM | TEMPERATURE: 97.6 F | SYSTOLIC BLOOD PRESSURE: 110 MMHG | DIASTOLIC BLOOD PRESSURE: 63 MMHG | BODY MASS INDEX: 28.12 KG/M2 | HEIGHT: 66 IN | WEIGHT: 175 LBS | OXYGEN SATURATION: 100 %

## 2024-09-14 DIAGNOSIS — I48.0 PAROXYSMAL ATRIAL FIBRILLATION (HCC): Primary | ICD-10-CM

## 2024-09-14 LAB
ALBUMIN SERPL-MCNC: 4.3 G/DL (ref 3.5–5.2)
ALBUMIN/GLOB SERPL: 1.6 (ref 1.1–2.2)
ALP SERPL-CCNC: 79 U/L (ref 35–104)
ALT SERPL-CCNC: 7 U/L (ref 10–35)
ANION GAP SERPL CALC-SCNC: 12 MMOL/L (ref 2–12)
AST SERPL-CCNC: 27 U/L (ref 10–35)
BASOPHILS # BLD: 0.1 K/UL (ref 0–1)
BASOPHILS NFR BLD: 1 % (ref 0–1)
BILIRUB SERPL-MCNC: 1.1 MG/DL (ref 0.2–1)
BUN SERPL-MCNC: 13 MG/DL (ref 8–23)
BUN/CREAT SERPL: 20 (ref 12–20)
CALCIUM SERPL-MCNC: 9 MG/DL (ref 8.8–10.2)
CHLORIDE SERPL-SCNC: 102 MMOL/L (ref 98–107)
CO2 SERPL-SCNC: 25 MMOL/L (ref 22–29)
CREAT SERPL-MCNC: 0.66 MG/DL (ref 0.5–0.9)
DIFFERENTIAL METHOD BLD: NORMAL
EOSINOPHIL # BLD: 0.4 K/UL (ref 0–0.4)
EOSINOPHIL NFR BLD: 7 %
ERYTHROCYTE [DISTWIDTH] IN BLOOD BY AUTOMATED COUNT: 12.7 % (ref 11.5–14.5)
GLOBULIN SER CALC-MCNC: 2.7 G/DL (ref 2–4)
GLUCOSE SERPL-MCNC: 88 MG/DL (ref 65–100)
HCT VFR BLD AUTO: 43.2 % (ref 35–47)
HGB BLD-MCNC: 14.2 G/DL (ref 11.5–16)
IMM GRANULOCYTES # BLD AUTO: 0 K/UL (ref 0–0.04)
IMM GRANULOCYTES NFR BLD AUTO: 0 % (ref 0–0.5)
LYMPHOCYTES # BLD: 1 K/UL (ref 0.8–3.5)
LYMPHOCYTES NFR BLD: 15 % (ref 12–49)
MAGNESIUM SERPL-MCNC: 1.9 MG/DL (ref 1.6–2.4)
MCH RBC QN AUTO: 32.5 PG (ref 26–34)
MCHC RBC AUTO-ENTMCNC: 32.9 G/DL (ref 30–36.5)
MCV RBC AUTO: 98.9 FL (ref 80–99)
MONOCYTES # BLD: 0.8 K/UL (ref 0–1)
MONOCYTES NFR BLD: 11 % (ref 5–13)
NEUTS SEG # BLD: 4.4 K/UL (ref 1.8–8)
NEUTS SEG NFR BLD: 66 % (ref 32–75)
NRBC # BLD: 0 K/UL (ref 0–0.01)
NRBC BLD-RTO: 0 PER 100 WBC
PLATELET # BLD AUTO: 268 K/UL (ref 150–400)
PMV BLD AUTO: 9.7 FL (ref 8.9–12.9)
POTASSIUM SERPL-SCNC: 4.6 MMOL/L (ref 3.5–5.1)
PROT SERPL-MCNC: 7 G/DL (ref 6.4–8.3)
RBC # BLD AUTO: 4.37 M/UL (ref 3.8–5.2)
SODIUM SERPL-SCNC: 139 MMOL/L (ref 136–145)
TROPONIN T SERPL HS-MCNC: 10 NG/L (ref 0–14)
WBC # BLD AUTO: 6.7 K/UL (ref 3.6–11)

## 2024-09-14 PROCEDURE — 84484 ASSAY OF TROPONIN QUANT: CPT

## 2024-09-14 PROCEDURE — 99284 EMERGENCY DEPT VISIT MOD MDM: CPT

## 2024-09-14 PROCEDURE — 96360 HYDRATION IV INFUSION INIT: CPT

## 2024-09-14 PROCEDURE — 80053 COMPREHEN METABOLIC PANEL: CPT

## 2024-09-14 PROCEDURE — 2580000003 HC RX 258: Performed by: EMERGENCY MEDICINE

## 2024-09-14 PROCEDURE — 93005 ELECTROCARDIOGRAM TRACING: CPT | Performed by: EMERGENCY MEDICINE

## 2024-09-14 PROCEDURE — 36415 COLL VENOUS BLD VENIPUNCTURE: CPT

## 2024-09-14 PROCEDURE — 83735 ASSAY OF MAGNESIUM: CPT

## 2024-09-14 PROCEDURE — 85025 COMPLETE CBC W/AUTO DIFF WBC: CPT

## 2024-09-14 RX ORDER — 0.9 % SODIUM CHLORIDE 0.9 %
500 INTRAVENOUS SOLUTION INTRAVENOUS ONCE
Status: COMPLETED | OUTPATIENT
Start: 2024-09-14 | End: 2024-09-14

## 2024-09-14 RX ADMIN — SODIUM CHLORIDE 500 ML: 9 INJECTION, SOLUTION INTRAVENOUS at 16:54

## 2024-09-14 ASSESSMENT — PAIN SCALES - GENERAL: PAINLEVEL_OUTOF10: 0

## 2024-09-15 LAB
EKG ATRIAL RATE: 52 BPM
EKG DIAGNOSIS: NORMAL
EKG P AXIS: 40 DEGREES
EKG P-R INTERVAL: 192 MS
EKG Q-T INTERVAL: 416 MS
EKG QRS DURATION: 80 MS
EKG QTC CALCULATION (BAZETT): 386 MS
EKG R AXIS: -3 DEGREES
EKG T AXIS: 10 DEGREES
EKG VENTRICULAR RATE: 52 BPM

## 2024-09-15 PROCEDURE — 93010 ELECTROCARDIOGRAM REPORT: CPT | Performed by: INTERNAL MEDICINE

## 2024-11-19 ENCOUNTER — OFFICE VISIT (OUTPATIENT)
Age: 79
End: 2024-11-19
Payer: MEDICARE

## 2024-11-19 ENCOUNTER — PROCEDURE VISIT (OUTPATIENT)
Age: 79
End: 2024-11-19

## 2024-11-19 VITALS
HEIGHT: 66 IN | OXYGEN SATURATION: 97 % | WEIGHT: 175.8 LBS | RESPIRATION RATE: 14 BRPM | HEART RATE: 70 BPM | BODY MASS INDEX: 28.25 KG/M2 | DIASTOLIC BLOOD PRESSURE: 80 MMHG | SYSTOLIC BLOOD PRESSURE: 116 MMHG

## 2024-11-19 DIAGNOSIS — H90.3 SENSORINEURAL HEARING LOSS (SNHL) OF BOTH EARS: Primary | ICD-10-CM

## 2024-11-19 DIAGNOSIS — H90.A21 SENSORINEURAL HEARING LOSS (SNHL) OF RIGHT EAR WITH RESTRICTED HEARING OF LEFT EAR: Primary | ICD-10-CM

## 2024-11-19 DIAGNOSIS — H90.A32 MIXED CONDUCTIVE AND SENSORINEURAL HEARING LOSS OF LEFT EAR WITH RESTRICTED HEARING OF RIGHT EAR: ICD-10-CM

## 2024-11-19 DIAGNOSIS — R42 DIZZINESS AND GIDDINESS: ICD-10-CM

## 2024-11-19 DIAGNOSIS — H61.22 IMPACTED CERUMEN OF LEFT EAR: ICD-10-CM

## 2024-11-19 PROCEDURE — 1036F TOBACCO NON-USER: CPT | Performed by: OTOLARYNGOLOGY

## 2024-11-19 PROCEDURE — G8419 CALC BMI OUT NRM PARAM NOF/U: HCPCS | Performed by: OTOLARYNGOLOGY

## 2024-11-19 PROCEDURE — G8484 FLU IMMUNIZE NO ADMIN: HCPCS | Performed by: OTOLARYNGOLOGY

## 2024-11-19 PROCEDURE — 1159F MED LIST DOCD IN RCRD: CPT | Performed by: OTOLARYNGOLOGY

## 2024-11-19 PROCEDURE — G8427 DOCREV CUR MEDS BY ELIG CLIN: HCPCS | Performed by: OTOLARYNGOLOGY

## 2024-11-19 PROCEDURE — 1090F PRES/ABSN URINE INCON ASSESS: CPT | Performed by: OTOLARYNGOLOGY

## 2024-11-19 PROCEDURE — 99213 OFFICE O/P EST LOW 20 MIN: CPT | Performed by: OTOLARYNGOLOGY

## 2024-11-19 PROCEDURE — 1123F ACP DISCUSS/DSCN MKR DOCD: CPT | Performed by: OTOLARYNGOLOGY

## 2024-11-19 PROCEDURE — G8399 PT W/DXA RESULTS DOCUMENT: HCPCS | Performed by: OTOLARYNGOLOGY

## 2024-11-19 ASSESSMENT — PATIENT HEALTH QUESTIONNAIRE - PHQ9
SUM OF ALL RESPONSES TO PHQ9 QUESTIONS 1 & 2: 0
1. LITTLE INTEREST OR PLEASURE IN DOING THINGS: NOT AT ALL
2. FEELING DOWN, DEPRESSED OR HOPELESS: NOT AT ALL
SUM OF ALL RESPONSES TO PHQ QUESTIONS 1-9: 0

## 2024-11-19 ASSESSMENT — ENCOUNTER SYMPTOMS
CHOKING: 0
WHEEZING: 0
SHORTNESS OF BREATH: 0
NAUSEA: 0
BACK PAIN: 0
STRIDOR: 0
APNEA: 0
SINUS PAIN: 0
ABDOMINAL PAIN: 0
TROUBLE SWALLOWING: 0
EYE ITCHING: 0
COUGH: 0
SORE THROAT: 0
VOICE CHANGE: 0
SINUS PRESSURE: 0
VOMITING: 0
EYE DISCHARGE: 0
PHOTOPHOBIA: 0

## 2024-11-19 NOTE — PROGRESS NOTES
Dona Padilla   1945, 79 y.o. female   [id number]       Referring Provider: Chris Silva MD  Referral Type: In an order in Epic    Reason for Visit: Evaluation of the cause of disorders of hearing, tinnitus, or balance.    ADULT AUDIOLOGIC EVALUATION      Dona Padilla is a 79 y.o. female seen today, 11/19/2024 , for an initial audiologic evaluation.  Patient was seen by Chris Silva MD following today's evaluation.    AUDIOLOGIC AND OTHER PERTINENT MEDICAL HISTORY:      Dona Padilla reports left hearing loss over the past year. She currently wears Phonak hearing aids from [employer]. She reports weekly lightheadedness that persists until she sleeps on some days.     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 Excellent speech understanding when in quiet in the right ear and very poor in the left ear. Tympanometry indicates normal middle ear function. Discussed test results and implications with patient. Discussed possible benefits of amplification.     Follow medical recommendations of Chris Silva MD.     ASSESSMENT AND FINDINGS:     Otoscopy unremarkable.    RIGHT EAR:  Hearing Sensitivity: Mild sloping to moderate sensorineural hearing loss  Speech Recognition Threshold: 35 dB HL  Word Recognition: Excellent 100%, based on NU-6 by-difficulty list at 75 dBHL using recorded speech stimuli.    Tympanometry: Normal peak pressure and compliance, Type A tympanogram, consistent with normal middle ear function.       LEFT EAR:  Hearing Sensitivity: Profound mixed hearing loss. Asymmetry with left poorer  Speech Recognition Threshold: 95 dB HL  Word Recognition: Very Poor 48%, based on NU-6 25-word list at 105 dBHL using recorded speech stimuli.    Tympanometry: Normal peak pressure

## 2024-11-19 NOTE — PROGRESS NOTES
Identified pt with two pt identifiers(name and ). Reviewed record in preparation for visit and have obtained necessary documentation. All patient medications has been reviewed.  Chief Complaint   Patient presents with    Cerumen Impaction       Vitals:    24 1253   BP: 116/80   Pulse: 70   Resp: 14   SpO2: 97%                   Coordination of Care Questionnaire:   1) Have you been to an emergency room, urgent care, or hospitalized since your last visit?   Yes, Tacky cardia, high BP       2. Have seen or consulted any other health care provider since your last visit? no        Patient is accompanied by self I have received verbal consent from Dona Padilla to discuss any/all medical information while they are present in the room.  
daily  Patient taking differently: Take 0.5 tablets by mouth as needed 11/29/23  Yes Andres Rodriguez APRN - NP   acetaminophen (TYLENOL) 500 MG tablet Tylenol Extra Strength 500 mg tablet   Take 2 tablets every 4 hours by oral route.   Yes Automatic Reconciliation, Ar   vitamin D (CHOLECALCIFEROL) 25 MCG (1000 UT) TABS tablet Take 2 tablets by mouth daily   Yes Automatic Reconciliation, Ar   fexofenadine (ALLEGRA) 180 MG tablet Take 1 tablet by mouth daily Prn   Yes Automatic Reconciliation, Ar   warfarin (COUMADIN) 5 MG tablet Take 1 tablet by mouth daily 8/24/20  Yes Automatic Reconciliation, Ar          Objective:     /80   Pulse 70   Resp 14   Ht 1.676 m (5' 6\")   Wt 79.7 kg (175 lb 12.8 oz)   SpO2 97%   BMI 28.37 kg/m²      Physical Exam  Constitutional:       General: She is not in acute distress.     Appearance: Normal appearance.   HENT:      Head: Normocephalic and atraumatic.      Jaw: No trismus or tenderness.      Salivary Glands: Right salivary gland is not diffusely enlarged. Left salivary gland is not diffusely enlarged.      Right Ear: Tympanic membrane, ear canal and external ear normal. Decreased hearing noted.      Left Ear: Tympanic membrane, ear canal and external ear normal. Decreased hearing noted. There is impacted cerumen.      Ears:      Comments: Hearing aids bilateral     Nose: Nose normal. No septal deviation.      Right Turbinates: Not enlarged.      Left Turbinates: Not enlarged.      Right Sinus: No maxillary sinus tenderness.      Left Sinus: No maxillary sinus tenderness.      Mouth/Throat:      Lips: Pink.      Mouth: Mucous membranes are moist. No oral lesions.      Tongue: No lesions.      Palate: No mass.      Pharynx: Uvula midline. No posterior oropharyngeal erythema.      Tonsils: No tonsillar exudate.   Eyes:      Conjunctiva/sclera: Conjunctivae normal.      Pupils: Pupils are equal, round, and reactive to light.   Neck:      Thyroid: No thyroid mass.

## 2024-11-19 NOTE — PATIENT INSTRUCTIONS
to contact your doctor if:    You do not get better as expected.   Where can you learn more?  Go to [email or web address] and sign in to your [employer] account. Enter Q823 in the Search Health Information box to learn more about \"Dizziness: Care Instructions.\"     If you do not have an account, please click on the \"Sign Up Now\" link.  Current as of: September 23, 2018  Content Version: 11.9  © 4450-6798 [employer]. Care instructions adapted under license by [employer]. If you have questions about a medical condition or this instruction, always ask your healthcare professional. [employer] disclaims any warranty or liability for your use of this information.                 Preventing Falls: Care Instructions  Your Care Instructions    Getting around your home safely can be a challenge if you have injuries or health problems that make it easy for you to fall. Loose rugs and furniture in walkways are among the dangers for many older people who have problems walking or who have poor eyesight. People who have conditions such as arthritis, osteoporosis, or dementia also have to be careful not to fall.  You can make your home safer with a few simple measures.    Follow-up care is a key part of your treatment and safety. Be sure to make and go to all appointments, and call your doctor if you are having problems. It's also a good idea to know your test results and keep a list of the medicines you take.    How can you care for yourself at home?    Taking care of yourself  You may get dizzy if you do not drink enough water. To prevent dehydration, drink plenty of fluids, enough so that your urine is light yellow or clear like water. Choose water and other caffeine-free clear liquids. If you have kidney, heart, or liver disease and have to limit fluids, talk with your doctor before you increase the amount of fluids you drink.  Exercise regularly to improve your strength, muscle

## 2024-11-27 ENCOUNTER — PATIENT MESSAGE (OUTPATIENT)
Dept: PRIMARY CARE CLINIC | Facility: CLINIC | Age: 79
End: 2024-11-27

## 2024-11-27 DIAGNOSIS — E11.40 TYPE 2 DIABETES MELLITUS WITH DIABETIC NEUROPATHY, WITHOUT LONG-TERM CURRENT USE OF INSULIN (HCC): ICD-10-CM

## 2024-11-27 RX ORDER — METFORMIN HYDROCHLORIDE 500 MG/1
500 TABLET, EXTENDED RELEASE ORAL 2 TIMES DAILY
Qty: 180 TABLET | Refills: 1 | Status: SHIPPED | OUTPATIENT
Start: 2024-11-27

## 2024-12-12 ENCOUNTER — PROCEDURE VISIT (OUTPATIENT)
Age: 79
End: 2024-12-12

## 2024-12-12 DIAGNOSIS — H90.A32 MIXED CONDUCTIVE AND SENSORINEURAL HEARING LOSS OF LEFT EAR WITH RESTRICTED HEARING OF RIGHT EAR: ICD-10-CM

## 2024-12-12 DIAGNOSIS — H90.A21 SENSORINEURAL HEARING LOSS (SNHL) OF RIGHT EAR WITH RESTRICTED HEARING OF LEFT EAR: Primary | ICD-10-CM

## 2024-12-12 NOTE — PROGRESS NOTES
Dona Padilla  1945.79 y.o. female   [id number]    HEARING AID CHECK    /MODEL: Phonak Audeo P90-R     RIGHT EAR: SN: [id number]  /DOME: / Small open    LEFT EAR: SN: [id number]  /DOME: / Small vented      BUTTONS: ENABLED  PROGRAMS: DISABLED  PHONE CONNECTIVITY: DISABLED        Dona Padilla was seen today, 12/12/2024, for a hearing aid check appointment.      PROCEDURES:     The patient currently wears Phonak hearing aids from [employer]. She reports her left dome is too big. The medium vented dome was replaced with a small vented dome. She was missing wax traps, so both were replaced. Both devices were connected to the software. The patient noted her voice had an echo. Adjustments were made in the software, and the patient found these adjustments beneficial.    Devices were cleaned and checked, and appeared to be in good working order.    PATIENT EDUCATION:     - Verbally and visually reviewed importance of consistent use and care and maintenance of devices.      Information was verbally shared with patient during appointment.        RECOMMENDATIONS:     Continue consistent hearing aid use  Return for a hearing aid check in 6 months or sooner as needed  Retest hearing as medically indicated and/or sooner if a change in hearing is noted.  Contact audiologist with questions/concerns as needed    **No charge for today's appointment; patient utilized 1 out of 3 follow-up visits.         Beverley Mejía, CCC-A  Audiologist

## 2024-12-17 DIAGNOSIS — E03.9 ACQUIRED HYPOTHYROIDISM: Chronic | ICD-10-CM

## 2024-12-17 RX ORDER — LEVOTHYROXINE SODIUM 88 MCG
TABLET ORAL
Qty: 90 TABLET | Refills: 1 | Status: SHIPPED | OUTPATIENT
Start: 2024-12-17

## 2024-12-19 DIAGNOSIS — G57.92 UNSPECIFIED MONONEUROPATHY OF LEFT LOWER LIMB: ICD-10-CM

## 2024-12-19 DIAGNOSIS — E11.40 TYPE 2 DIABETES MELLITUS WITH DIABETIC NEUROPATHY, WITHOUT LONG-TERM CURRENT USE OF INSULIN (HCC): ICD-10-CM

## 2024-12-19 RX ORDER — GABAPENTIN 300 MG/1
CAPSULE ORAL
Qty: 90 CAPSULE | Refills: 0 | Status: SHIPPED | OUTPATIENT
Start: 2024-12-19 | End: 2025-02-01

## 2025-01-16 SDOH — HEALTH STABILITY: PHYSICAL HEALTH: ON AVERAGE, HOW MANY DAYS PER WEEK DO YOU ENGAGE IN MODERATE TO STRENUOUS EXERCISE (LIKE A BRISK WALK)?: 4 DAYS

## 2025-01-16 SDOH — HEALTH STABILITY: PHYSICAL HEALTH: ON AVERAGE, HOW MANY MINUTES DO YOU ENGAGE IN EXERCISE AT THIS LEVEL?: 20 MIN

## 2025-01-16 ASSESSMENT — PATIENT HEALTH QUESTIONNAIRE - PHQ9
1. LITTLE INTEREST OR PLEASURE IN DOING THINGS: NOT AT ALL
SUM OF ALL RESPONSES TO PHQ QUESTIONS 1-9: 0
SUM OF ALL RESPONSES TO PHQ QUESTIONS 1-9: 0
SUM OF ALL RESPONSES TO PHQ9 QUESTIONS 1 & 2: 0
2. FEELING DOWN, DEPRESSED OR HOPELESS: NOT AT ALL
SUM OF ALL RESPONSES TO PHQ QUESTIONS 1-9: 0
SUM OF ALL RESPONSES TO PHQ QUESTIONS 1-9: 0

## 2025-01-16 ASSESSMENT — LIFESTYLE VARIABLES
HOW MANY STANDARD DRINKS CONTAINING ALCOHOL DO YOU HAVE ON A TYPICAL DAY: 0
HOW MANY STANDARD DRINKS CONTAINING ALCOHOL DO YOU HAVE ON A TYPICAL DAY: PATIENT DOES NOT DRINK
HOW OFTEN DO YOU HAVE A DRINK CONTAINING ALCOHOL: NEVER
HOW OFTEN DO YOU HAVE SIX OR MORE DRINKS ON ONE OCCASION: 1
HOW OFTEN DO YOU HAVE A DRINK CONTAINING ALCOHOL: 1

## 2025-01-21 SDOH — ECONOMIC STABILITY: FOOD INSECURITY: WITHIN THE PAST 12 MONTHS, THE FOOD YOU BOUGHT JUST DIDN'T LAST AND YOU DIDN'T HAVE MONEY TO GET MORE.: NEVER TRUE

## 2025-01-21 SDOH — ECONOMIC STABILITY: INCOME INSECURITY: IN THE LAST 12 MONTHS, WAS THERE A TIME WHEN YOU WERE NOT ABLE TO PAY THE MORTGAGE OR RENT ON TIME?: NO

## 2025-01-21 SDOH — ECONOMIC STABILITY: FOOD INSECURITY: WITHIN THE PAST 12 MONTHS, YOU WORRIED THAT YOUR FOOD WOULD RUN OUT BEFORE YOU GOT MONEY TO BUY MORE.: NEVER TRUE

## 2025-01-21 SDOH — ECONOMIC STABILITY: TRANSPORTATION INSECURITY
IN THE PAST 12 MONTHS, HAS THE LACK OF TRANSPORTATION KEPT YOU FROM MEDICAL APPOINTMENTS OR FROM GETTING MEDICATIONS?: NO

## 2025-01-22 ENCOUNTER — OFFICE VISIT (OUTPATIENT)
Dept: PRIMARY CARE CLINIC | Facility: CLINIC | Age: 80
End: 2025-01-22

## 2025-01-22 VITALS
WEIGHT: 179 LBS | BODY MASS INDEX: 28.77 KG/M2 | RESPIRATION RATE: 16 BRPM | HEART RATE: 53 BPM | OXYGEN SATURATION: 99 % | DIASTOLIC BLOOD PRESSURE: 80 MMHG | HEIGHT: 66 IN | TEMPERATURE: 98.2 F | SYSTOLIC BLOOD PRESSURE: 117 MMHG

## 2025-01-22 DIAGNOSIS — E11.40 TYPE 2 DIABETES MELLITUS WITH DIABETIC NEUROPATHY, WITHOUT LONG-TERM CURRENT USE OF INSULIN (HCC): ICD-10-CM

## 2025-01-22 DIAGNOSIS — I48.0 PAROXYSMAL ATRIAL FIBRILLATION (HCC): ICD-10-CM

## 2025-01-22 DIAGNOSIS — E03.9 ACQUIRED HYPOTHYROIDISM: ICD-10-CM

## 2025-01-22 DIAGNOSIS — C50.911 MALIGNANT NEOPLASM OF RIGHT FEMALE BREAST, UNSPECIFIED ESTROGEN RECEPTOR STATUS, UNSPECIFIED SITE OF BREAST (HCC): ICD-10-CM

## 2025-01-22 DIAGNOSIS — Z00.00 MEDICARE ANNUAL WELLNESS VISIT, SUBSEQUENT: Primary | ICD-10-CM

## 2025-01-22 DIAGNOSIS — G57.92 UNSPECIFIED MONONEUROPATHY OF LEFT LOWER LIMB: ICD-10-CM

## 2025-01-22 DIAGNOSIS — M81.0 AGE-RELATED OSTEOPOROSIS WITHOUT CURRENT PATHOLOGICAL FRACTURE: ICD-10-CM

## 2025-01-22 RX ORDER — GABAPENTIN 300 MG/1
CAPSULE ORAL
Qty: 90 CAPSULE | Refills: 1 | Status: SHIPPED | OUTPATIENT
Start: 2025-01-22 | End: 2025-03-07

## 2025-01-22 RX ORDER — IBANDRONATE SODIUM 150 MG/1
150 TABLET, FILM COATED ORAL
Qty: 3 TABLET | Refills: 1 | Status: SHIPPED | OUTPATIENT
Start: 2025-01-22 | End: 2025-07-21

## 2025-01-22 RX ORDER — METFORMIN HYDROCHLORIDE 500 MG/1
500 TABLET, EXTENDED RELEASE ORAL 2 TIMES DAILY
Qty: 180 TABLET | Refills: 1 | Status: SHIPPED | OUTPATIENT
Start: 2025-01-22

## 2025-01-22 RX ORDER — LEVOTHYROXINE SODIUM 88 MCG
TABLET ORAL
Qty: 90 TABLET | Refills: 3 | Status: SHIPPED | OUTPATIENT
Start: 2025-01-22

## 2025-01-22 NOTE — PROGRESS NOTES
Chief Complaint   Patient presents with    Medicare AWV     /80 (Site: Left Upper Arm, Position: Sitting)   Pulse 53   Temp 98.2 °F (36.8 °C) (Oral)   Resp 16   Ht 1.676 m (5' 6\")   Wt 81.2 kg (179 lb)   SpO2 99%   BMI 28.89 kg/m²     \"Have you been to the ER, urgent care clinic since your last visit?  Hospitalized since your last visit?\"    NO    “Have you seen or consulted any other health care providers outside our system since your last visit?”    NO

## 2025-01-22 NOTE — PROGRESS NOTES
Medicare Annual Wellness Visit    Dona Padilla is here for Medicare AWV    Assessment & Plan   Medicare annual wellness visit, subsequent  Paroxysmal atrial fibrillation (HCC)  Assessment & Plan:   Monitored by specialist- no acute findings meriting change in the plan  Malignant neoplasm of right female breast, unspecified estrogen receptor status, unspecified site of breast (HCC)  Assessment & Plan:   Monitored by specialist- no acute findings meriting change in the plan  Type 2 diabetes mellitus with diabetic neuropathy, without long-term current use of insulin (HCC)  Assessment & Plan:   Chronic, at goal (stable), continue current treatment plan  Orders:  -     Albumin/Creatinine Ratio, Urine  -     Hemoglobin A1C  -     Lipid Panel  -     CBC with Auto Differential  -     Comprehensive Metabolic Panel  -     gabapentin (NEURONTIN) 300 MG capsule; TAKE ONE CAPSULE BY MOUTH EVERY EVENING, Disp-90 capsule, R-1Normal  -     metFORMIN (GLUCOPHAGE-XR) 500 MG extended release tablet; Take 1 tablet by mouth 2 times daily, Disp-180 tablet, R-1Normal  Age-related osteoporosis without current pathological fracture  -     Vitamin D 25 Hydroxy  -     ibandronate (BONIVA) 150 MG tablet; Take 1 tablet by mouth every 30 days Taken the 10th of month, Disp-3 tablet, R-1Normal  Acquired hypothyroidism  Comments:  chronic, stable.  will monitor labs.  Orders:  -     TSH + Free T4 Panel  -     SYNTHROID 88 MCG tablet; TAKE 1 TABLET DAILY BEFORE BREAKFAST FOR HYPOTHYROIDISM, Disp-90 tablet, R-3, DAWNormal  Unspecified mononeuropathy of left lower limb  Comments:  well controlled on gabapentin at night.   uses cane for ambulation  Orders:  -     gabapentin (NEURONTIN) 300 MG capsule; TAKE ONE CAPSULE BY MOUTH EVERY EVENING, Disp-90 capsule, R-1Normal     Return in 6 months (on 7/22/2025) for Chronic OV.     Subjective   The following acute and/or chronic problems were also addressed today:    Hypothyroidism:  Patient has been using

## 2025-01-23 LAB
25(OH)D3+25(OH)D2 SERPL-MCNC: 48.8 NG/ML (ref 30–100)
ALBUMIN SERPL-MCNC: 4.7 G/DL (ref 3.8–4.8)
ALBUMIN/CREAT UR: 10 MG/G CREAT (ref 0–29)
ALP SERPL-CCNC: 78 IU/L (ref 44–121)
ALT SERPL-CCNC: 12 IU/L (ref 0–32)
AST SERPL-CCNC: 23 IU/L (ref 0–40)
BASOPHILS # BLD AUTO: 0.1 X10E3/UL (ref 0–0.2)
BASOPHILS NFR BLD AUTO: 1 %
BILIRUB SERPL-MCNC: 0.9 MG/DL (ref 0–1.2)
BUN SERPL-MCNC: 15 MG/DL (ref 8–27)
BUN/CREAT SERPL: 22 (ref 12–28)
CALCIUM SERPL-MCNC: 9.3 MG/DL (ref 8.7–10.3)
CHLORIDE SERPL-SCNC: 101 MMOL/L (ref 96–106)
CHOLEST SERPL-MCNC: 160 MG/DL (ref 100–199)
CO2 SERPL-SCNC: 25 MMOL/L (ref 20–29)
CREAT SERPL-MCNC: 0.69 MG/DL (ref 0.57–1)
CREAT UR-MCNC: 102.7 MG/DL
EGFRCR SERPLBLD CKD-EPI 2021: 89 ML/MIN/1.73
EOSINOPHIL # BLD AUTO: 0.3 X10E3/UL (ref 0–0.4)
EOSINOPHIL NFR BLD AUTO: 5 %
ERYTHROCYTE [DISTWIDTH] IN BLOOD BY AUTOMATED COUNT: 12.2 % (ref 11.7–15.4)
GLOBULIN SER CALC-MCNC: 2.7 G/DL (ref 1.5–4.5)
GLUCOSE SERPL-MCNC: 90 MG/DL (ref 70–99)
HBA1C MFR BLD: 5.5 % (ref 4.8–5.6)
HCT VFR BLD AUTO: 43.7 % (ref 34–46.6)
HDLC SERPL-MCNC: 72 MG/DL
HGB BLD-MCNC: 14.4 G/DL (ref 11.1–15.9)
IMM GRANULOCYTES # BLD AUTO: 0 X10E3/UL (ref 0–0.1)
IMM GRANULOCYTES NFR BLD AUTO: 0 %
LDLC SERPL CALC-MCNC: 64 MG/DL (ref 0–99)
LYMPHOCYTES # BLD AUTO: 1.5 X10E3/UL (ref 0.7–3.1)
LYMPHOCYTES NFR BLD AUTO: 22 %
MCH RBC QN AUTO: 32.5 PG (ref 26.6–33)
MCHC RBC AUTO-ENTMCNC: 33 G/DL (ref 31.5–35.7)
MCV RBC AUTO: 99 FL (ref 79–97)
MICROALBUMIN UR-MCNC: 9.9 UG/ML
MONOCYTES # BLD AUTO: 0.7 X10E3/UL (ref 0.1–0.9)
MONOCYTES NFR BLD AUTO: 11 %
NEUTROPHILS # BLD AUTO: 4.3 X10E3/UL (ref 1.4–7)
NEUTROPHILS NFR BLD AUTO: 61 %
PLATELET # BLD AUTO: 272 X10E3/UL (ref 150–450)
POTASSIUM SERPL-SCNC: 4.5 MMOL/L (ref 3.5–5.2)
PROT SERPL-MCNC: 7.4 G/DL (ref 6–8.5)
RBC # BLD AUTO: 4.43 X10E6/UL (ref 3.77–5.28)
SODIUM SERPL-SCNC: 140 MMOL/L (ref 134–144)
T4 FREE SERPL-MCNC: 1.5 NG/DL (ref 0.82–1.77)
TRIGL SERPL-MCNC: 142 MG/DL (ref 0–149)
TSH SERPL DL<=0.005 MIU/L-ACNC: 1.67 UIU/ML (ref 0.45–4.5)
VLDLC SERPL CALC-MCNC: 24 MG/DL (ref 5–40)
WBC # BLD AUTO: 7.1 X10E3/UL (ref 3.4–10.8)

## 2025-02-17 ENCOUNTER — HOSPITAL ENCOUNTER (OUTPATIENT)
Facility: HOSPITAL | Age: 80
Discharge: HOME | End: 2025-02-20
Payer: MEDICARE

## 2025-02-17 VITALS
DIASTOLIC BLOOD PRESSURE: 76 MMHG | HEIGHT: 66 IN | HEART RATE: 54 BPM | BODY MASS INDEX: 27.8 KG/M2 | SYSTOLIC BLOOD PRESSURE: 126 MMHG | WEIGHT: 173 LBS | OXYGEN SATURATION: 99 % | TEMPERATURE: 97.4 F

## 2025-02-17 LAB
APPEARANCE UR: CLEAR
BACTERIA URNS QL MICRO: NEGATIVE /HPF
BILIRUB UR QL: NEGATIVE
COLOR UR: NORMAL
EPITH CASTS URNS QL MICRO: NORMAL /LPF
GLUCOSE UR STRIP.AUTO-MCNC: NEGATIVE MG/DL
HGB UR QL STRIP: NEGATIVE
HYALINE CASTS URNS QL MICRO: NORMAL /LPF (ref 0–5)
INR PPP: 1 (ref 0.9–1.1)
KETONES UR QL STRIP.AUTO: NEGATIVE MG/DL
LEUKOCYTE ESTERASE UR QL STRIP.AUTO: NEGATIVE
NITRITE UR QL STRIP.AUTO: NEGATIVE
PH UR STRIP: 6 (ref 5–8)
PROT UR STRIP-MCNC: NEGATIVE MG/DL
PROTHROMBIN TIME: 11.1 SEC (ref 9.2–11.2)
RBC #/AREA URNS HPF: NORMAL /HPF (ref 0–5)
SP GR UR REFRACTOMETRY: 1.01 (ref 1–1.03)
URINE CULTURE IF INDICATED: NORMAL
UROBILINOGEN UR QL STRIP.AUTO: 0.2 EU/DL (ref 0.2–1)
WBC URNS QL MICRO: NORMAL /HPF (ref 0–4)

## 2025-02-17 PROCEDURE — 36415 COLL VENOUS BLD VENIPUNCTURE: CPT

## 2025-02-17 PROCEDURE — 86850 RBC ANTIBODY SCREEN: CPT

## 2025-02-17 PROCEDURE — 81001 URINALYSIS AUTO W/SCOPE: CPT

## 2025-02-17 PROCEDURE — 86900 BLOOD TYPING SEROLOGIC ABO: CPT

## 2025-02-17 PROCEDURE — 85610 PROTHROMBIN TIME: CPT

## 2025-02-17 PROCEDURE — 86901 BLOOD TYPING SEROLOGIC RH(D): CPT

## 2025-02-17 ASSESSMENT — PAIN DESCRIPTION - PAIN TYPE: TYPE: CHRONIC PAIN

## 2025-02-17 ASSESSMENT — PAIN DESCRIPTION - LOCATION: LOCATION: KNEE

## 2025-02-17 ASSESSMENT — PAIN SCALES - GENERAL: PAINLEVEL_OUTOF10: 3

## 2025-02-17 ASSESSMENT — PAIN DESCRIPTION - DESCRIPTORS: DESCRIPTORS: ACHING

## 2025-02-17 ASSESSMENT — PAIN - FUNCTIONAL ASSESSMENT: PAIN_FUNCTIONAL_ASSESSMENT: PREVENTS OR INTERFERES SOME ACTIVE ACTIVITIES AND ADLS

## 2025-02-17 ASSESSMENT — PAIN DESCRIPTION - ORIENTATION: ORIENTATION: LEFT

## 2025-02-17 NOTE — PERIOP NOTE
[address] [address]   MAIN OR                     [phone]    MAIN PRE OP             [phone]                                                                                AMBULATORY PRE OP          [phone]  PRE-ADMISSION TESTING    [phone]     Surgery Date:  3/5/25       Is surgery arrival time given by surgeon?  YES  NO    If “NO”, [facility] staff will call you between 4 and 7pm the day before your surgery with your arrival time. (If your surgery is on a Monday, we will call you the Friday before.)    Call [phone] after 7pm Monday-Friday if you did not receive this call.    INSTRUCTIONS BEFORE YOUR SURGERY   When You  Arrive Arrive at [facility] Patient Access on 1st floor the day of your surgery.  Have your insurance card, photo ID,living will/advanced directive/POA (if applicable),  and any copayment (if needed)   Food   and   Drink NO solid food after midnight the night before surgery. You can drink clear liquids from midnight until ONE hour prior to your arrival at the hospital on the day of your surgery. Clear liquids include:  Water  Apple juice (no sediment)  Carbonated beverages  Black coffee(no cream/milk)  Tea(no cream/milk)  Gatorade    No alcohol (beer, wine, liquor) or marijuana (smoking) 24 hours, edibles (3 days). Stop smoking cigarettes 14 days before surgery (helps w/healing and breathing).   Medications to   TAKE   Morning of Surgery MEDICATIONS TO TAKE THE MORNING OF SURGERY WITH A SIP OF WATER: SYNTHROID, FLECAINIDE,     You may take these medications, IF NEEDED, the morning of surgery: METOPROLOL, ALLEGRA    Ask your surgeon/prescribing doctor for instructions on taking or stopping these medications prior to surgery: ELIQUIS   Medications to STOP  before surgery Non-Steroidal anti-inflammatory Drugs (NSAID's): for example, Diclofenac(Voltaren),  Ibuprofen (Advil, Motrin), Naproxen (Aleve) 3

## 2025-02-18 LAB
ABO + RH BLD: NORMAL
BACTERIA SPEC CULT: NORMAL
BACTERIA SPEC CULT: NORMAL
BLOOD GROUP ANTIBODIES SERPL: NORMAL
SERVICE CMNT-IMP: NORMAL
SPECIMEN EXP DATE BLD: NORMAL

## 2025-03-04 ENCOUNTER — ANESTHESIA EVENT (OUTPATIENT)
Facility: HOSPITAL | Age: 80
DRG: 468 | End: 2025-03-04
Payer: MEDICARE

## 2025-03-04 NOTE — DISCHARGE INSTRUCTIONS
Discharge Instructions Knee Replacement  Dr. Coughlin  Patient Name:  Dona Padilla    Follow up care  Follow up visit with Dr. Coughlin in 4 weeks.  Call [phone] extension [phone] (Enrrique) to make an appointment.  If Home Health has been arranged for you, they will call you to arrange dates/times for visits. Call them if you do not hear from them within 24 hours after you go home.    Activity at home  Take a short walk every hour; except at night when sleeping.  Do your Home Exercise Program 3 times every day.   After exercising lie down and elevate your leg on pillows for 15-30 minutes to decrease swelling.  Refer to your patient notebook for more information.  Bathing and caring for your incision  You have a waterproof surgical bandage (called alfonso). This bandage is to remain in place until your follow up appointment.   You may shower the day after surgery.      Preventing blood clots  Continue Eliquis 2.5 mg twice each day for one month (30 days) following surgery.   Call Dr. Coughlin for signs of a blood clot in your leg: calf pain, tenderness, redness, swelling of lower leg   Preventing lung congestion  Use your incentive spirometer 4 times a day; do 10 repetitions each time  Remember to keep the small blue ball between the two arrows when taking a slow, deep breath   Pain Management  Get up and walk a short distance to relieve pain and stiffness.  Place ice wrap on your knee except when you are walking. The gel ice packs should be changed about every 4 hours.  Elevate your leg on pillows for 15-30 minutes.   Pain Medications  Take Tylenol 650mg (take two 325mg tablets) every 6 hours for the next 4 weeks.  Take Meloxicam (Mobic) every day as prescribed to decrease swelling and inflammation. Do not take Meloxicam if you have had stomach ulcers.  Take Famotidine (Pepcid) 20mg twice a day to prevent an upset stomach (if taking Aspirin and/or Meloxicam).  If needed, take Tramadol (narcotic pain pill) every 6  hours as prescribed.  If Tramadol has not relieved your pain within 1 hour, take Oxycodone 5mg (narcotic pain pill). Take Oxycodone only if needed and stop once your pain is tolerable.  Take all medications with a small amount of food.   As your pain decreases, take the narcotics less often or take ½ of a pill.  Call Dr. Coughlin if you have side effects from your narcotic pain medication: itching, drowsiness, dizziness, upset stomach, dry mouth, constipation or if you medication is not relieving your pain.  Diet after surgery  You may resume your normal diet. Include vegetables, fruit, whole grains, lean meats, and low-fat dairy products. Eat food high in fiber.   Drink plenty of fluids, including 8 cups of water daily.  Take a stool softener (Senokot-s or Colace) to prevent constipation. If constipation occurs you may take a laxative (Milk of Magnesia, Dulcolax tablets).    Avoid after surgery  Do not take any over-the-counter medication for pain except Tylenol  Do not take more than 3000mg (3 Grams) of Tylenol in 24 hours  Do not drink alcoholic beverages  Do not smoke  Do not drive until seen for follow up appointment  Do not place frozen gel pack directly on your skin. It can cause frostbite.   Do not take a tub bath, swim or get in a hot tub for 8 weeks  Prevention of falls and safety at home  Set up an area where you can rest comfortably leaving space around furniture to allow you to walk with your walker.  Keep stairs, hallways and bathrooms well lit; especially at night.  Arrange for care for your pets  Keep your home free of clutter.   Call Dr. Coughlin at [phone] for:  Pain that is not relieved by pain medication, ice and activity  Side effects of medications  Increased/spread of bruising  Warning signs of infection:  persistent fever greater than 100 degrees  shaking or chills  increased redness, tenderness, swelling or drainage from incision  increased pain during activity or rest  Warning signs of a

## 2025-03-05 ENCOUNTER — ANESTHESIA (OUTPATIENT)
Facility: HOSPITAL | Age: 80
DRG: 468 | End: 2025-03-05
Payer: MEDICARE

## 2025-03-05 ENCOUNTER — HOSPITAL ENCOUNTER (INPATIENT)
Facility: HOSPITAL | Age: 80
LOS: 1 days | Discharge: HOME HEALTH | DRG: 467 | End: 2025-03-06
Attending: ORTHOPAEDIC SURGERY | Admitting: ORTHOPAEDIC SURGERY
Payer: MEDICARE

## 2025-03-05 DIAGNOSIS — Z96.652 S/P REVISION OF TOTAL KNEE, LEFT: Primary | ICD-10-CM

## 2025-03-05 PROBLEM — T84.84XA PAINFUL ORTHOPAEDIC HARDWARE: Status: ACTIVE | Noted: 2025-03-05

## 2025-03-05 LAB
GLUCOSE BLD STRIP.AUTO-MCNC: 128 MG/DL (ref 65–117)
GLUCOSE BLD STRIP.AUTO-MCNC: 138 MG/DL (ref 65–117)
GLUCOSE BLD STRIP.AUTO-MCNC: 94 MG/DL (ref 65–117)
SERVICE CMNT-IMP: ABNORMAL
SERVICE CMNT-IMP: ABNORMAL
SERVICE CMNT-IMP: NORMAL

## 2025-03-05 PROCEDURE — 6360000002 HC RX W HCPCS: Performed by: ORTHOPAEDIC SURGERY

## 2025-03-05 PROCEDURE — 3700000001 HC ADD 15 MINUTES (ANESTHESIA): Performed by: ORTHOPAEDIC SURGERY

## 2025-03-05 PROCEDURE — 2580000003 HC RX 258: Performed by: ORTHOPAEDIC SURGERY

## 2025-03-05 PROCEDURE — 7100000001 HC PACU RECOVERY - ADDTL 15 MIN: Performed by: ORTHOPAEDIC SURGERY

## 2025-03-05 PROCEDURE — G0378 HOSPITAL OBSERVATION PER HR: HCPCS

## 2025-03-05 PROCEDURE — 1100000000 HC RM PRIVATE

## 2025-03-05 PROCEDURE — 2580000003 HC RX 258: Performed by: PHYSICIAN ASSISTANT

## 2025-03-05 PROCEDURE — 6360000002 HC RX W HCPCS: Performed by: ANESTHESIOLOGY

## 2025-03-05 PROCEDURE — 97116 GAIT TRAINING THERAPY: CPT

## 2025-03-05 PROCEDURE — 6360000002 HC RX W HCPCS: Performed by: PHYSICIAN ASSISTANT

## 2025-03-05 PROCEDURE — 97161 PT EVAL LOW COMPLEX 20 MIN: CPT

## 2025-03-05 PROCEDURE — C1713 ANCHOR/SCREW BN/BN,TIS/BN: HCPCS | Performed by: ORTHOPAEDIC SURGERY

## 2025-03-05 PROCEDURE — 2500000003 HC RX 250 WO HCPCS: Performed by: PHYSICIAN ASSISTANT

## 2025-03-05 PROCEDURE — 3E033XZ INTRODUCTION OF VASOPRESSOR INTO PERIPHERAL VEIN, PERCUTANEOUS APPROACH: ICD-10-PCS | Performed by: ORTHOPAEDIC SURGERY

## 2025-03-05 PROCEDURE — 6370000000 HC RX 637 (ALT 250 FOR IP): Performed by: NURSE PRACTITIONER

## 2025-03-05 PROCEDURE — 2720000010 HC SURG SUPPLY STERILE: Performed by: ORTHOPAEDIC SURGERY

## 2025-03-05 PROCEDURE — 2500000003 HC RX 250 WO HCPCS: Performed by: ORTHOPAEDIC SURGERY

## 2025-03-05 PROCEDURE — 7100000000 HC PACU RECOVERY - FIRST 15 MIN: Performed by: ORTHOPAEDIC SURGERY

## 2025-03-05 PROCEDURE — 3600000005 HC SURGERY LEVEL 5 BASE: Performed by: ORTHOPAEDIC SURGERY

## 2025-03-05 PROCEDURE — C1776 JOINT DEVICE (IMPLANTABLE): HCPCS | Performed by: ORTHOPAEDIC SURGERY

## 2025-03-05 PROCEDURE — 97530 THERAPEUTIC ACTIVITIES: CPT

## 2025-03-05 PROCEDURE — 82962 GLUCOSE BLOOD TEST: CPT

## 2025-03-05 PROCEDURE — 2580000003 HC RX 258: Performed by: STUDENT IN AN ORGANIZED HEALTH CARE EDUCATION/TRAINING PROGRAM

## 2025-03-05 PROCEDURE — 6370000000 HC RX 637 (ALT 250 FOR IP): Performed by: PHYSICIAN ASSISTANT

## 2025-03-05 PROCEDURE — 0SRD0J9 REPLACEMENT OF LEFT KNEE JOINT WITH SYNTHETIC SUBSTITUTE, CEMENTED, OPEN APPROACH: ICD-10-PCS | Performed by: ORTHOPAEDIC SURGERY

## 2025-03-05 PROCEDURE — 3700000000 HC ANESTHESIA ATTENDED CARE: Performed by: ORTHOPAEDIC SURGERY

## 2025-03-05 PROCEDURE — 2500000003 HC RX 250 WO HCPCS: Performed by: STUDENT IN AN ORGANIZED HEALTH CARE EDUCATION/TRAINING PROGRAM

## 2025-03-05 PROCEDURE — 6360000002 HC RX W HCPCS: Performed by: STUDENT IN AN ORGANIZED HEALTH CARE EDUCATION/TRAINING PROGRAM

## 2025-03-05 PROCEDURE — 2709999900 HC NON-CHARGEABLE SUPPLY: Performed by: ORTHOPAEDIC SURGERY

## 2025-03-05 PROCEDURE — 0SPD0JZ REMOVAL OF SYNTHETIC SUBSTITUTE FROM LEFT KNEE JOINT, OPEN APPROACH: ICD-10-PCS | Performed by: ORTHOPAEDIC SURGERY

## 2025-03-05 PROCEDURE — 3600000015 HC SURGERY LEVEL 5 ADDTL 15MIN: Performed by: ORTHOPAEDIC SURGERY

## 2025-03-05 DEVICE — CEMENT BNE 40GM FULL DOSE PMMA W/ GENT HI VISC RADPQ LNG: Type: IMPLANTABLE DEVICE | Site: KNEE | Status: FUNCTIONAL

## 2025-03-05 DEVICE — IMPLANTABLE DEVICE
Type: IMPLANTABLE DEVICE | Site: KNEE | Status: FUNCTIONAL
Brand: PERSONA® VIVACIT-E®

## 2025-03-05 RX ORDER — DEXAMETHASONE SODIUM PHOSPHATE 10 MG/ML
8 INJECTION, SOLUTION INTRAMUSCULAR; INTRAVENOUS ONCE
Status: DISCONTINUED | OUTPATIENT
Start: 2025-03-05 | End: 2025-03-05 | Stop reason: HOSPADM

## 2025-03-05 RX ORDER — GLYCOPYRROLATE 0.2 MG/ML
INJECTION INTRAMUSCULAR; INTRAVENOUS
Status: DISCONTINUED | OUTPATIENT
Start: 2025-03-05 | End: 2025-03-05 | Stop reason: SDUPTHER

## 2025-03-05 RX ORDER — BISACODYL 10 MG
10 SUPPOSITORY, RECTAL RECTAL DAILY PRN
Status: DISCONTINUED | OUTPATIENT
Start: 2025-03-05 | End: 2025-03-06 | Stop reason: HOSPADM

## 2025-03-05 RX ORDER — KETOROLAC TROMETHAMINE 30 MG/ML
15 INJECTION, SOLUTION INTRAMUSCULAR; INTRAVENOUS EVERY 6 HOURS
Status: COMPLETED | OUTPATIENT
Start: 2025-03-05 | End: 2025-03-06

## 2025-03-05 RX ORDER — OXYCODONE HYDROCHLORIDE 5 MG/1
5 TABLET ORAL EVERY 4 HOURS PRN
Status: DISCONTINUED | OUTPATIENT
Start: 2025-03-05 | End: 2025-03-06

## 2025-03-05 RX ORDER — PROPOFOL 10 MG/ML
INJECTION, EMULSION INTRAVENOUS
Status: DISCONTINUED | OUTPATIENT
Start: 2025-03-05 | End: 2025-03-05 | Stop reason: SDUPTHER

## 2025-03-05 RX ORDER — HYDROXYZINE HYDROCHLORIDE 10 MG/1
10 TABLET, FILM COATED ORAL EVERY 8 HOURS PRN
Status: DISCONTINUED | OUTPATIENT
Start: 2025-03-05 | End: 2025-03-06 | Stop reason: HOSPADM

## 2025-03-05 RX ORDER — ACETAMINOPHEN 325 MG/1
650 TABLET ORAL EVERY 6 HOURS
Status: DISCONTINUED | OUTPATIENT
Start: 2025-03-05 | End: 2025-03-06 | Stop reason: HOSPADM

## 2025-03-05 RX ORDER — SODIUM CHLORIDE 9 MG/ML
INJECTION, SOLUTION INTRAVENOUS PRN
Status: DISCONTINUED | OUTPATIENT
Start: 2025-03-05 | End: 2025-03-05 | Stop reason: HOSPADM

## 2025-03-05 RX ORDER — FAMOTIDINE 20 MG/1
20 TABLET, FILM COATED ORAL 2 TIMES DAILY
Status: DISCONTINUED | OUTPATIENT
Start: 2025-03-05 | End: 2025-03-06 | Stop reason: HOSPADM

## 2025-03-05 RX ORDER — SODIUM CHLORIDE, SODIUM LACTATE, POTASSIUM CHLORIDE, CALCIUM CHLORIDE 600; 310; 30; 20 MG/100ML; MG/100ML; MG/100ML; MG/100ML
INJECTION, SOLUTION INTRAVENOUS CONTINUOUS
Status: DISCONTINUED | OUTPATIENT
Start: 2025-03-05 | End: 2025-03-05 | Stop reason: HOSPADM

## 2025-03-05 RX ORDER — ONDANSETRON 4 MG/1
4 TABLET, ORALLY DISINTEGRATING ORAL EVERY 8 HOURS PRN
Status: DISCONTINUED | OUTPATIENT
Start: 2025-03-05 | End: 2025-03-06 | Stop reason: HOSPADM

## 2025-03-05 RX ORDER — HYDRALAZINE HYDROCHLORIDE 20 MG/ML
10 INJECTION INTRAMUSCULAR; INTRAVENOUS ONCE
Status: DISCONTINUED | OUTPATIENT
Start: 2025-03-05 | End: 2025-03-05 | Stop reason: HOSPADM

## 2025-03-05 RX ORDER — SODIUM CHLORIDE 0.9 % (FLUSH) 0.9 %
5-40 SYRINGE (ML) INJECTION EVERY 12 HOURS SCHEDULED
Status: DISCONTINUED | OUTPATIENT
Start: 2025-03-05 | End: 2025-03-05 | Stop reason: HOSPADM

## 2025-03-05 RX ORDER — FENTANYL CITRATE 50 UG/ML
25 INJECTION, SOLUTION INTRAMUSCULAR; INTRAVENOUS EVERY 5 MIN PRN
Status: DISCONTINUED | OUTPATIENT
Start: 2025-03-05 | End: 2025-03-05 | Stop reason: HOSPADM

## 2025-03-05 RX ORDER — EPHEDRINE SULFATE 50 MG/ML
INJECTION INTRAVENOUS
Status: DISCONTINUED | OUTPATIENT
Start: 2025-03-05 | End: 2025-03-05 | Stop reason: SDUPTHER

## 2025-03-05 RX ORDER — SODIUM CHLORIDE 0.9 % (FLUSH) 0.9 %
5-40 SYRINGE (ML) INJECTION PRN
Status: DISCONTINUED | OUTPATIENT
Start: 2025-03-05 | End: 2025-03-05 | Stop reason: HOSPADM

## 2025-03-05 RX ORDER — LEVOTHYROXINE SODIUM 88 UG/1
88 TABLET ORAL DAILY
Status: DISCONTINUED | OUTPATIENT
Start: 2025-03-05 | End: 2025-03-06 | Stop reason: HOSPADM

## 2025-03-05 RX ORDER — METOPROLOL SUCCINATE 25 MG/1
25 TABLET, EXTENDED RELEASE ORAL DAILY
Status: DISCONTINUED | OUTPATIENT
Start: 2025-03-05 | End: 2025-03-06 | Stop reason: HOSPADM

## 2025-03-05 RX ORDER — FLECAINIDE ACETATE 100 MG/1
50 TABLET ORAL 2 TIMES DAILY
Status: DISCONTINUED | OUTPATIENT
Start: 2025-03-05 | End: 2025-03-06 | Stop reason: HOSPADM

## 2025-03-05 RX ORDER — ASPIRIN 81 MG/1
81 TABLET ORAL 2 TIMES DAILY
Status: DISCONTINUED | OUTPATIENT
Start: 2025-03-05 | End: 2025-03-06 | Stop reason: HOSPADM

## 2025-03-05 RX ORDER — TRANEXAMIC ACID 100 MG/ML
INJECTION, SOLUTION INTRAVENOUS PRN
Status: DISCONTINUED | OUTPATIENT
Start: 2025-03-05 | End: 2025-03-05 | Stop reason: HOSPADM

## 2025-03-05 RX ORDER — TRAMADOL HYDROCHLORIDE 50 MG/1
50 TABLET ORAL EVERY 6 HOURS PRN
Status: DISCONTINUED | OUTPATIENT
Start: 2025-03-05 | End: 2025-03-06 | Stop reason: HOSPADM

## 2025-03-05 RX ORDER — DEXAMETHASONE SODIUM PHOSPHATE 4 MG/ML
INJECTION, SOLUTION INTRA-ARTICULAR; INTRALESIONAL; INTRAMUSCULAR; INTRAVENOUS; SOFT TISSUE
Status: DISCONTINUED | OUTPATIENT
Start: 2025-03-05 | End: 2025-03-05 | Stop reason: SDUPTHER

## 2025-03-05 RX ORDER — SODIUM CHLORIDE 0.9 % (FLUSH) 0.9 %
5-40 SYRINGE (ML) INJECTION PRN
Status: DISCONTINUED | OUTPATIENT
Start: 2025-03-05 | End: 2025-03-06 | Stop reason: HOSPADM

## 2025-03-05 RX ORDER — ROCURONIUM BROMIDE 10 MG/ML
INJECTION, SOLUTION INTRAVENOUS
Status: DISCONTINUED | OUTPATIENT
Start: 2025-03-05 | End: 2025-03-05 | Stop reason: SDUPTHER

## 2025-03-05 RX ORDER — HYDRALAZINE HYDROCHLORIDE 20 MG/ML
INJECTION INTRAMUSCULAR; INTRAVENOUS
Status: DISCONTINUED | OUTPATIENT
Start: 2025-03-05 | End: 2025-03-05 | Stop reason: SDUPTHER

## 2025-03-05 RX ORDER — GABAPENTIN 300 MG/1
300 CAPSULE ORAL NIGHTLY
Status: DISCONTINUED | OUTPATIENT
Start: 2025-03-05 | End: 2025-03-06 | Stop reason: HOSPADM

## 2025-03-05 RX ORDER — ACETAMINOPHEN 500 MG
1000 TABLET ORAL ONCE
Status: COMPLETED | OUTPATIENT
Start: 2025-03-05 | End: 2025-03-05

## 2025-03-05 RX ORDER — METFORMIN HYDROCHLORIDE 500 MG/1
500 TABLET, EXTENDED RELEASE ORAL 2 TIMES DAILY
Status: DISCONTINUED | OUTPATIENT
Start: 2025-03-05 | End: 2025-03-06 | Stop reason: HOSPADM

## 2025-03-05 RX ORDER — MIDAZOLAM HYDROCHLORIDE 1 MG/ML
INJECTION, SOLUTION INTRAMUSCULAR; INTRAVENOUS
Status: DISCONTINUED | OUTPATIENT
Start: 2025-03-05 | End: 2025-03-05 | Stop reason: SDUPTHER

## 2025-03-05 RX ORDER — SODIUM CHLORIDE 0.9 % (FLUSH) 0.9 %
5-40 SYRINGE (ML) INJECTION EVERY 12 HOURS SCHEDULED
Status: DISCONTINUED | OUTPATIENT
Start: 2025-03-05 | End: 2025-03-06 | Stop reason: HOSPADM

## 2025-03-05 RX ORDER — EPINEPHRINE 1 MG/ML(1)
AMPUL (ML) INJECTION PRN
Status: DISCONTINUED | OUTPATIENT
Start: 2025-03-05 | End: 2025-03-05 | Stop reason: HOSPADM

## 2025-03-05 RX ORDER — HYDROMORPHONE HYDROCHLORIDE 1 MG/ML
0.5 INJECTION, SOLUTION INTRAMUSCULAR; INTRAVENOUS; SUBCUTANEOUS EVERY 5 MIN PRN
Status: DISCONTINUED | OUTPATIENT
Start: 2025-03-05 | End: 2025-03-05 | Stop reason: HOSPADM

## 2025-03-05 RX ORDER — POLYETHYLENE GLYCOL 3350 17 G/17G
17 POWDER, FOR SOLUTION ORAL DAILY
Status: DISCONTINUED | OUTPATIENT
Start: 2025-03-05 | End: 2025-03-06 | Stop reason: HOSPADM

## 2025-03-05 RX ORDER — ONDANSETRON 2 MG/ML
4 INJECTION INTRAMUSCULAR; INTRAVENOUS EVERY 6 HOURS PRN
Status: DISCONTINUED | OUTPATIENT
Start: 2025-03-05 | End: 2025-03-06 | Stop reason: HOSPADM

## 2025-03-05 RX ORDER — FENTANYL CITRATE 50 UG/ML
100 INJECTION, SOLUTION INTRAMUSCULAR; INTRAVENOUS
Status: DISCONTINUED | OUTPATIENT
Start: 2025-03-05 | End: 2025-03-05 | Stop reason: HOSPADM

## 2025-03-05 RX ORDER — PROCHLORPERAZINE EDISYLATE 5 MG/ML
5 INJECTION INTRAMUSCULAR; INTRAVENOUS
Status: DISCONTINUED | OUTPATIENT
Start: 2025-03-05 | End: 2025-03-05 | Stop reason: HOSPADM

## 2025-03-05 RX ORDER — SODIUM CHLORIDE 9 MG/ML
INJECTION, SOLUTION INTRAVENOUS PRN
Status: DISCONTINUED | OUTPATIENT
Start: 2025-03-05 | End: 2025-03-06 | Stop reason: HOSPADM

## 2025-03-05 RX ORDER — 0.9 % SODIUM CHLORIDE 0.9 %
500 INTRAVENOUS SOLUTION INTRAVENOUS PRN
Status: DISCONTINUED | OUTPATIENT
Start: 2025-03-05 | End: 2025-03-06 | Stop reason: HOSPADM

## 2025-03-05 RX ORDER — LIDOCAINE HYDROCHLORIDE 10 MG/ML
1 INJECTION, SOLUTION EPIDURAL; INFILTRATION; INTRACAUDAL; PERINEURAL
Status: DISCONTINUED | OUTPATIENT
Start: 2025-03-05 | End: 2025-03-05 | Stop reason: HOSPADM

## 2025-03-05 RX ORDER — HYDROMORPHONE HYDROCHLORIDE 2 MG/ML
INJECTION, SOLUTION INTRAMUSCULAR; INTRAVENOUS; SUBCUTANEOUS
Status: DISCONTINUED | OUTPATIENT
Start: 2025-03-05 | End: 2025-03-05 | Stop reason: SDUPTHER

## 2025-03-05 RX ORDER — SENNA AND DOCUSATE SODIUM 50; 8.6 MG/1; MG/1
1 TABLET, FILM COATED ORAL 2 TIMES DAILY
Status: DISCONTINUED | OUTPATIENT
Start: 2025-03-05 | End: 2025-03-06 | Stop reason: HOSPADM

## 2025-03-05 RX ORDER — OXYCODONE HYDROCHLORIDE 5 MG/1
5 TABLET ORAL
Status: DISCONTINUED | OUTPATIENT
Start: 2025-03-05 | End: 2025-03-05 | Stop reason: HOSPADM

## 2025-03-05 RX ORDER — ONDANSETRON 2 MG/ML
4 INJECTION INTRAMUSCULAR; INTRAVENOUS
Status: DISCONTINUED | OUTPATIENT
Start: 2025-03-05 | End: 2025-03-05 | Stop reason: HOSPADM

## 2025-03-05 RX ORDER — ONDANSETRON 2 MG/ML
INJECTION INTRAMUSCULAR; INTRAVENOUS
Status: DISCONTINUED | OUTPATIENT
Start: 2025-03-05 | End: 2025-03-05 | Stop reason: SDUPTHER

## 2025-03-05 RX ORDER — LIDOCAINE HYDROCHLORIDE 10 MG/ML
INJECTION, SOLUTION EPIDURAL; INFILTRATION; INTRACAUDAL; PERINEURAL PRN
Status: DISCONTINUED | OUTPATIENT
Start: 2025-03-05 | End: 2025-03-05 | Stop reason: HOSPADM

## 2025-03-05 RX ORDER — NALOXONE HYDROCHLORIDE 0.4 MG/ML
0.4 INJECTION, SOLUTION INTRAMUSCULAR; INTRAVENOUS; SUBCUTANEOUS PRN
Status: DISCONTINUED | OUTPATIENT
Start: 2025-03-05 | End: 2025-03-06 | Stop reason: HOSPADM

## 2025-03-05 RX ORDER — NALOXONE HYDROCHLORIDE 0.4 MG/ML
INJECTION, SOLUTION INTRAMUSCULAR; INTRAVENOUS; SUBCUTANEOUS PRN
Status: DISCONTINUED | OUTPATIENT
Start: 2025-03-05 | End: 2025-03-05 | Stop reason: HOSPADM

## 2025-03-05 RX ORDER — MIDAZOLAM HYDROCHLORIDE 2 MG/2ML
2 INJECTION, SOLUTION INTRAMUSCULAR; INTRAVENOUS PRN
Status: DISCONTINUED | OUTPATIENT
Start: 2025-03-05 | End: 2025-03-05 | Stop reason: HOSPADM

## 2025-03-05 RX ORDER — ACETAMINOPHEN 500 MG
1000 TABLET ORAL ONCE
Status: DISCONTINUED | OUTPATIENT
Start: 2025-03-05 | End: 2025-03-05 | Stop reason: SDUPTHER

## 2025-03-05 RX ORDER — SODIUM CHLORIDE 9 MG/ML
INJECTION, SOLUTION INTRAVENOUS CONTINUOUS
Status: DISCONTINUED | OUTPATIENT
Start: 2025-03-05 | End: 2025-03-06 | Stop reason: HOSPADM

## 2025-03-05 RX ORDER — CELECOXIB 100 MG/1
100 CAPSULE ORAL ONCE
Status: COMPLETED | OUTPATIENT
Start: 2025-03-05 | End: 2025-03-05

## 2025-03-05 RX ADMIN — HYDROMORPHONE HYDROCHLORIDE 0.4 MG: 2 INJECTION, SOLUTION INTRAMUSCULAR; INTRAVENOUS; SUBCUTANEOUS at 11:27

## 2025-03-05 RX ADMIN — FAMOTIDINE 20 MG: 20 TABLET, FILM COATED ORAL at 21:24

## 2025-03-05 RX ADMIN — SUGAMMADEX 200 MG: 100 INJECTION, SOLUTION INTRAVENOUS at 12:05

## 2025-03-05 RX ADMIN — ACETAMINOPHEN 1000 MG: 500 TABLET ORAL at 09:51

## 2025-03-05 RX ADMIN — TRAMADOL HYDROCHLORIDE 50 MG: 50 TABLET, COATED ORAL at 18:42

## 2025-03-05 RX ADMIN — FENTANYL CITRATE 25 MCG: 0.05 INJECTION, SOLUTION INTRAMUSCULAR; INTRAVENOUS at 13:44

## 2025-03-05 RX ADMIN — PROPOFOL 30 MG: 10 INJECTION, EMULSION INTRAVENOUS at 11:27

## 2025-03-05 RX ADMIN — SODIUM CHLORIDE 2000 MG: 9 INJECTION, SOLUTION INTRAVENOUS at 11:22

## 2025-03-05 RX ADMIN — METOPROLOL SUCCINATE 25 MG: 25 TABLET, EXTENDED RELEASE ORAL at 17:49

## 2025-03-05 RX ADMIN — ROCURONIUM BROMIDE 50 MG: 10 INJECTION INTRAVENOUS at 11:13

## 2025-03-05 RX ADMIN — ACETAMINOPHEN 650 MG: 325 TABLET ORAL at 21:20

## 2025-03-05 RX ADMIN — TRANEXAMIC ACID 1000 MG: 100 INJECTION, SOLUTION INTRAVENOUS at 11:52

## 2025-03-05 RX ADMIN — PROPOFOL 100 MG: 10 INJECTION, EMULSION INTRAVENOUS at 11:13

## 2025-03-05 RX ADMIN — ONDANSETRON 4 MG: 2 INJECTION INTRAMUSCULAR; INTRAVENOUS at 11:23

## 2025-03-05 RX ADMIN — MIDAZOLAM 2 MG: 1 INJECTION INTRAMUSCULAR; INTRAVENOUS at 11:04

## 2025-03-05 RX ADMIN — DEXAMETHASONE SODIUM PHOSPHATE 8 MG: 4 INJECTION, SOLUTION INTRAMUSCULAR; INTRAVENOUS at 11:22

## 2025-03-05 RX ADMIN — FENTANYL CITRATE 25 MCG: 0.05 INJECTION, SOLUTION INTRAMUSCULAR; INTRAVENOUS at 13:50

## 2025-03-05 RX ADMIN — KETOROLAC TROMETHAMINE 15 MG: 30 INJECTION, SOLUTION INTRAMUSCULAR at 21:31

## 2025-03-05 RX ADMIN — ASPIRIN 81 MG: 81 TABLET, COATED ORAL at 21:24

## 2025-03-05 RX ADMIN — SODIUM CHLORIDE: 9 INJECTION, SOLUTION INTRAVENOUS at 12:47

## 2025-03-05 RX ADMIN — ACETAMINOPHEN 650 MG: 325 TABLET ORAL at 16:03

## 2025-03-05 RX ADMIN — METFORMIN HYDROCHLORIDE 500 MG: 500 TABLET, EXTENDED RELEASE ORAL at 21:25

## 2025-03-05 RX ADMIN — HYDROMORPHONE HYDROCHLORIDE 0.6 MG: 2 INJECTION, SOLUTION INTRAMUSCULAR; INTRAVENOUS; SUBCUTANEOUS at 11:13

## 2025-03-05 RX ADMIN — SODIUM CHLORIDE, PRESERVATIVE FREE 10 ML: 5 INJECTION INTRAVENOUS at 21:31

## 2025-03-05 RX ADMIN — PROPOFOL 100 MCG/KG/MIN: 10 INJECTION, EMULSION INTRAVENOUS at 11:20

## 2025-03-05 RX ADMIN — CELECOXIB 100 MG: 100 CAPSULE ORAL at 09:51

## 2025-03-05 RX ADMIN — KETOROLAC TROMETHAMINE 15 MG: 30 INJECTION, SOLUTION INTRAMUSCULAR at 16:03

## 2025-03-05 RX ADMIN — EPHEDRINE SULFATE 10 MG: 50 INJECTION INTRAVENOUS at 11:17

## 2025-03-05 RX ADMIN — PHENYLEPHRINE HYDROCHLORIDE 10 MCG/MIN: 10 INJECTION INTRAVENOUS at 11:39

## 2025-03-05 RX ADMIN — GABAPENTIN 300 MG: 300 CAPSULE ORAL at 21:25

## 2025-03-05 RX ADMIN — GLYCOPYRROLATE 0.2 MG: 0.2 INJECTION INTRAMUSCULAR; INTRAVENOUS at 11:12

## 2025-03-05 RX ADMIN — TRANEXAMIC ACID 1000 MG: 100 INJECTION, SOLUTION INTRAVENOUS at 11:26

## 2025-03-05 RX ADMIN — PROPOFOL 20 MG: 10 INJECTION, EMULSION INTRAVENOUS at 11:30

## 2025-03-05 RX ADMIN — WATER 2000 MG: 1 INJECTION INTRAMUSCULAR; INTRAVENOUS; SUBCUTANEOUS at 18:44

## 2025-03-05 RX ADMIN — HYDRALAZINE HYDROCHLORIDE 4 MG: 20 INJECTION INTRAMUSCULAR; INTRAVENOUS at 11:56

## 2025-03-05 ASSESSMENT — PAIN SCALES - GENERAL
PAINLEVEL_OUTOF10: 0
PAINLEVEL_OUTOF10: 6
PAINLEVEL_OUTOF10: 2
PAINLEVEL_OUTOF10: 2
PAINLEVEL_OUTOF10: 0
PAINLEVEL_OUTOF10: 5
PAINLEVEL_OUTOF10: 0

## 2025-03-05 ASSESSMENT — PAIN DESCRIPTION - ORIENTATION
ORIENTATION: LEFT
ORIENTATION: RIGHT
ORIENTATION: LEFT

## 2025-03-05 ASSESSMENT — PAIN DESCRIPTION - DESCRIPTORS
DESCRIPTORS: SHARP
DESCRIPTORS: DISCOMFORT
DESCRIPTORS: ACHING;DISCOMFORT

## 2025-03-05 ASSESSMENT — PAIN DESCRIPTION - LOCATION
LOCATION: LEG
LOCATION: KNEE
LOCATION: KNEE

## 2025-03-05 ASSESSMENT — PAIN - FUNCTIONAL ASSESSMENT: PAIN_FUNCTIONAL_ASSESSMENT: 0-10

## 2025-03-05 NOTE — BRIEF OP NOTE
Brief Postoperative Note      Patient: Dona Padilla  YOB: 1945  MRN: [id number]    Date of Procedure: 3/5/2025    Pre-Op Diagnosis Codes:      * Pain due to total knee replacement [T84.84XA, Z96.659]    Post-Op Diagnosis: Same       Procedure(s):  LEFT TOTAL KNEE REVISION ARTHROPLASTY    Surgeon(s):  Jose Coughlin MD    Assistant:  Surgical Assistant: Janeth Tyler  Physician Assistant: Radha Carrasco PA-C    Anesthesia: Spinal    Estimated Blood Loss (mL): less than 50     Complications: None    Specimens:   * No specimens in log *    Implants:  Implant Name Type Inv. Item Serial No.  Lot No. LRB No. Used Action   CEMENT BNE 40GM FULL DOSE PMMA W/ GENT HI VISC RADPQ LNG - SNA  CEMENT BNE 40GM FULL DOSE PMMA W/ GENT HI VISC RADPQ LNG NA [employer]- [id number] Left 1 Implanted   COMPONENT PAT [id number] THK9MM STD KNEE VIVACIT-E YESSI PERSONA - SNA  COMPONENT PAT [id number] THK9MM STD KNEE VIVACIT-E YESSI PERSONA NA [employer]- [id number] Left 1 Implanted         Drains: * No LDAs found *    Findings:  Infection Present At Time Of Surgery (PATOS) (choose all levels that have infection present):  No infection present  Other Findings: Periprosthetic instability    Electronically signed by Jose Coughlin MD on 3/5/2025 at 11:54 AM

## 2025-03-05 NOTE — PROGRESS NOTES
Ortho NP Note    POD# 0  s/p LEFT TOTAL KNEE REVISION ARTHROPLASTY     Pt resting on stretcher in PACU. Drowsy, wakes to voice.   Reports no postop pain currently. Reviewed multimodal pain control regimen.   Denies nausea.    Postop plan reviewed - No complaints/concerns.    VSS Afebrile.    Visit Vitals  BP (!) 144/60   Pulse 54   Temp 97.5 °F (36.4 °C) (Oral)   Resp 22   SpO2 100%       Voiding status: due to void          Labs    Lab Results   Component Value Date/Time    HGB 14.4 01/22/2025 11:09 AM      Lab Results   Component Value Date/Time    INR 1.0 02/17/2025 11:20 AM      Lab Results   Component Value Date/Time     01/22/2025 11:09 AM    K 4.5 01/22/2025 11:09 AM     01/22/2025 11:09 AM    CO2 25 01/22/2025 11:09 AM    BUN 15 01/22/2025 11:09 AM     Recent Glucose Results:   Glucose   Date Value Ref Range Status   01/22/2025 90 70 - 99 mg/dL Final   09/14/2024 88 65 - 100 mg/dL Final   05/29/2024 90 70 - 99 mg/dL Final   12/01/2023 86 70 - 99 mg/dL Final           There is no height or weight on file to calculate BMI. : A BMI > 30 is classified as obesity and > 40 is classified as morbid obesity.       Dressing c.d.i  Cryotherapy in place over incision  Calves soft and supple; No pain with passive stretch  Sensation and motor intact. +PF/DF/EHL intact   SCDs for mechanical DVT proph while in bed     PLAN:  1) PT BID - WBAT  2) Eliquis 2.5 mg PO BID for DVT Prophylaxis while inpatient, then resume home dose 5 mg BID (hx Afib). Encouraged early mobilization, bed exercises, and SCD use.  3) GI Prophylaxis - Pepcid    4) Pain control - scheduled tylenol  and toradol, and prn  tramadol and oxycodone  .  5) Post op care - Continue bowel regimen, encouraged IS. Straight cath per protocol. Prineo to remain in place until follow up unless integrity is lost.    6) Readniess for discharge:     [x] Vital Signs stable    [] Hgb stable    [] + Voiding    [x] Wound intact, drainage minimal    [x] Tolerating  PO intake     [] Cleared by PT (OT if applicable)     [] Stair training completed (if applicable)    [] Independent / Contact Guard Assist (household distance)     [] Bed mobility     [] Car transfers     [] ADL’s    [x] Adequate pain control on oral medication alone       Plans to return home with HH & sister's support.     Sisi Buck, APRN - NP

## 2025-03-05 NOTE — ANESTHESIA PRE PROCEDURE
Department of Anesthesiology  Preprocedure Note       Name:  Dona Padilla   Age:  80 y.o.  :  1945                                          MRN:  [id number]         Date:  3/5/2025      Surgeon: Surgeon(s):  Jose Coughlin MD    Procedure: Procedure(s):  LEFT TOTAL KNEE REVISION ARTHROPLASTY    Medications prior to admission:   Prior to Admission medications    Medication Sig Start Date End Date Taking? Authorizing Provider   apixaban (ELIQUIS) 5 MG TABS tablet Take 1 tablet by mouth 24   Luz Mata MD   gabapentin (NEURONTIN) 300 MG capsule TAKE ONE CAPSULE BY MOUTH EVERY EVENING 1/22/25 3/7/25  Andres Rodriguez APRN - NP   ibandronate (BONIVA) 150 MG tablet Take 1 tablet by mouth every 30 days Taken the 10th of month 1/22/25 7/21/25  Andres Rodriguez APRN - NP   metFORMIN (GLUCOPHAGE-XR) 500 MG extended release tablet Take 1 tablet by mouth 2 times daily 25   Andres Rodriguez APRN - NP   SYNTHROID 88 MCG tablet TAKE 1 TABLET DAILY BEFORE BREAKFAST FOR HYPOTHYROIDISM 25   Andres Rodriguez APRN - NP   vitamin B-12 (CYANOCOBALAMIN) 1000 MCG tablet Take 2.5 tablets by mouth daily    Luz Mata MD   METAMUCIL FIBER PO Take by mouth daily as needed    Luz Mata MD   flecainide (TAMBOCOR) 50 MG tablet Take 1 tablet by mouth 2 times daily 23   Luz Mata MD   vitamin C (ASCORBIC ACID) 500 MG tablet Take 1 tablet by mouth daily    Luz Mata MD   metoprolol succinate (TOPROL XL) 25 MG extended release tablet Take 0.5 tablets by mouth daily  Patient taking differently: Take 0.5 tablets by mouth as needed 23   Andres Rodriguez APRN - NP   acetaminophen (TYLENOL) 500 MG tablet Tylenol Extra Strength 500 mg tablet   Take 2 tablets every 4 hours by oral route.    Automatic Reconciliation, Ar   vitamin D (CHOLECALCIFEROL) 25 MCG (1000 UT) TABS tablet Take 1 tablet by mouth daily    Automatic Reconciliation, Ar   fexofenadine (ALLEGRA)

## 2025-03-05 NOTE — PLAN OF CARE
PMID: [id number].  3. Zuri FERRARO, Louie PINEDO, Meaghan S, Amy K, Albina CLINTON. Activity Measure for Post-Acute Care \"6-Clicks\" Basic Mobility Scores Predict Discharge Destination After Acute Care Hospitalization in Select Patient Groups: A Retrospective, Observational Study. Arch Rehabil Res Clin Transl. 2022 Jul 16;4(3):597374. doi: 10.1016/j.arrct.2022.385690. PMID: 35800220; PMCID: [id number].  4. Bob MUELLER, Areli S, Jodie W, Paulette FELICIANO. AM-PAC Short Forms Manual 4.0. Revised 2/2020.                                                                                                                                                                                                                              Pain Rating:  Left knee 1-2/10     Pain Intervention(s):   patient medicated for pain prior to session, ice, rest, repositioning, and pain is at a level acceptable to the patient    Activity Tolerance:   Good POD# 0    After treatment:   Patient left in no apparent distress in bed, Call bell within reach, Caregiver / family present, Side rails x3, and Updated patient's board on functional status and mobility recommendations    COMMUNICATION/EDUCATION:   The patient's plan of care was discussed with: registered nurse    Patient Education  Education Given To: Patient;Family  Education Provided: Role of Therapy;Mobility Training;Plan of Care;Fall Prevention Strategies;Home Exercise Program;Transfer Training;Equipment  Education Method: Demonstration;Verbal;Teach Back  Barriers to Learning: None  Education Outcome: Verbalized understanding;Continued education needed    Thank you for this referral.  Katelyn Durham, PT  Minutes: 30      Physical Therapy Evaluation Charge Determination   History Examination Presentation Decision-Making   LOW Complexity : Zero comorbidities / personal factors that will impact the outcome / POC LOW Complexity : 1-2 Standardized tests and measures addressing body structure, function, activity limitation and  / or participation in recreation  LOW Complexity : Stable, uncomplicated  AM-PAC  LOW    Based on the above components, the patient evaluation is determined to be of the following complexity level: Low

## 2025-03-05 NOTE — OP NOTE
[address]  [address]                            OPERATIVE REPORT      PATIENT NAME: TROY COY               : 1945  MED REC NO: [id number]                       ROOM: OR  ACCOUNT NO: [id number]                       ADMIT DATE: 2025  PROVIDER: Jose Coughlin MD    DATE OF SERVICE:  2025    PREOPERATIVE DIAGNOSES:  Left knee periprosthetic instability.    POSTOPERATIVE DIAGNOSES:  Left knee periprosthetic instability.    PROCEDURES PERFORMED:  Revision left knee arthroplasty, 2 components.    SURGEON:  Jose Coughlin MD    ASSISTANT:  ***, YOVANA    ANESTHESIA:  Spinal block.    ESTIMATED BLOOD LOSS:  50.    SPECIMENS REMOVED:  None.    INTRAOPERATIVE FINDINGS:  ***     COMPLICATIONS:  None.    IMPLANTS:  Include a Cathy size 35 mm patellar button with a size 14 mm polyethylene component.    INDICATIONS:  This is a 80-year-old female who had a total knee arthroplasty done 5 years ago by another surgeon.  She never had any issues with incision healing or reactions after surgery.  She developed progressive instability and recurrent effusions.  She has pain on stairs as well.  Her x-rays reveal significant imbalance with loose lateral space, tight medial space.  She also has an undersurface patella.  We discussed the risks and benefits of revision including possible medial release with increase in poly size and resurfacing of her patella.  She was very amenable to this.  Risks and benefits of surgery were discussed and she understands all the risks because of revision.    DESCRIPTION OF PROCEDURE:  The patient was identified in the preoperative holding area.  The correct site was marked and confirmed.  She was taken to the operating room and placed supine after anesthesia was induced.  She was prepped and draped in standard sterile fashion, received 2 g of cefazolin and 1 g of tranexamic acid prior to incision.   tranexamic acid prior to incision.  Time-out was held and correct site confirmed.  I began by ellipsing out her previous incision, elevating medial and lateral flap.  I carried out a midvastus arthrotomy.  There was hemosiderin stained tissue and minimal fluid inside the knee.  I performed a limited medial release.  I then everted the patella and performed a lateral partial facetectomy.  I measured the patella to be 24 mm and I resected 9 mm.  I prepared the patella for 35.  I took the knee through range of motion.  This was grossly unstable in extension and flexion.  I took out the 10 poly and trialed with a 14 poly.  This was very stable in both flexion and extension and was well balanced.  I removed the trials.  I irrigated with Betadine and normal saline.  I injected 10% Marcaine with Exparel into the soft tissue.  I placed the poly and cemented the patella.  Excess cement removed.  We then closed the arthrotomy with #1 Vicryl followed by Stratafix.  Closed the subcu with a running 2-0 Stratafix and Prineo soft dressing placed.  The patient was taken to the PACU in stable condition.  All counts were correct x2.  Radha Carrasco was critical for salgado portions of the case including retraction, management, wound closure, dressing application.  There were no one else available at the time.        MD MG BARNES/AQS  D:  03/05/2025 11:53:57  T:  03/05/2025 16:22:43  JOB #:  [id number]

## 2025-03-05 NOTE — H&P
Update History & Physical    The patient's History and Physical  was reviewed with the patient and I examined the patient. There was no change. The surgical site was confirmed by the patient and me.       Plan: The risks, benefits, expected outcome, and alternative to the recommended procedure have been discussed with the patient. Patient understands and wants to proceed with the procedure.     Electronically signed by Jose Coughlin MD on 3/5/2025 at 10:11 AM

## 2025-03-05 NOTE — PROGRESS NOTES
1245: family updated via volunteer desk.    1340: verbal sign out Dr. Lentz.    1510: TRANSFER - OUT REPORT:    Verbal report given to Kassandra LY(name) on Dona Padilla  being transferred to [facility](unit) for routine post-op       Report consisted of patient’s Situation, Background, Assessment and   Recommendations(SBAR).     Time Pre op antibiotic given:pre op  Anesthesia Stop time: 1223    Information from the following report(s) SBAR, Kardex, OR Summary, Intake/Output, and MAR was reviewed with the receiving nurse.    Opportunity for questions and clarification was provided.     Is the patient on 02? Yes       L/Min 2      Is the patient on a monitor? No    Is the nurse transporting with the patient? No    At transfer, are there Patient Belongings with the patient?  If Yes, please note/list:    Surgical Waiting Area notified of patient's transfer from PACU? Yes

## 2025-03-06 LAB
ANION GAP SERPL CALC-SCNC: 9 MMOL/L (ref 2–12)
BUN SERPL-MCNC: 17 MG/DL (ref 6–20)
BUN/CREAT SERPL: 27 (ref 12–20)
CALCIUM SERPL-MCNC: 8.6 MG/DL (ref 8.5–10.1)
CHLORIDE SERPL-SCNC: 105 MMOL/L (ref 97–108)
CO2 SERPL-SCNC: 22 MMOL/L (ref 21–32)
CREAT SERPL-MCNC: 0.64 MG/DL (ref 0.55–1.02)
GLUCOSE BLD STRIP.AUTO-MCNC: 100 MG/DL (ref 65–117)
GLUCOSE BLD STRIP.AUTO-MCNC: 83 MG/DL (ref 65–117)
GLUCOSE SERPL-MCNC: 113 MG/DL (ref 65–100)
HCT VFR BLD AUTO: 34.3 % (ref 35–47)
HGB BLD-MCNC: 11.4 G/DL (ref 11.5–16)
POTASSIUM SERPL-SCNC: 3.8 MMOL/L (ref 3.5–5.1)
SERVICE CMNT-IMP: NORMAL
SERVICE CMNT-IMP: NORMAL
SODIUM SERPL-SCNC: 136 MMOL/L (ref 136–145)

## 2025-03-06 PROCEDURE — 6370000000 HC RX 637 (ALT 250 FOR IP): Performed by: PHYSICIAN ASSISTANT

## 2025-03-06 PROCEDURE — 97116 GAIT TRAINING THERAPY: CPT

## 2025-03-06 PROCEDURE — 82962 GLUCOSE BLOOD TEST: CPT

## 2025-03-06 PROCEDURE — 85014 HEMATOCRIT: CPT

## 2025-03-06 PROCEDURE — 80048 BASIC METABOLIC PNL TOTAL CA: CPT

## 2025-03-06 PROCEDURE — APPNB60 APP NON BILLABLE TIME 46-60 MINS: Performed by: NURSE PRACTITIONER

## 2025-03-06 PROCEDURE — 2500000003 HC RX 250 WO HCPCS: Performed by: PHYSICIAN ASSISTANT

## 2025-03-06 PROCEDURE — 6360000002 HC RX W HCPCS: Performed by: PHYSICIAN ASSISTANT

## 2025-03-06 PROCEDURE — 97165 OT EVAL LOW COMPLEX 30 MIN: CPT

## 2025-03-06 PROCEDURE — 36415 COLL VENOUS BLD VENIPUNCTURE: CPT

## 2025-03-06 PROCEDURE — 85018 HEMOGLOBIN: CPT

## 2025-03-06 PROCEDURE — 97535 SELF CARE MNGMENT TRAINING: CPT

## 2025-03-06 RX ORDER — SENNA AND DOCUSATE SODIUM 50; 8.6 MG/1; MG/1
1 TABLET, FILM COATED ORAL 2 TIMES DAILY
Qty: 60 TABLET | Refills: 0 | Status: SHIPPED | OUTPATIENT
Start: 2025-03-06

## 2025-03-06 RX ORDER — TRAMADOL HYDROCHLORIDE 50 MG/1
50 TABLET ORAL EVERY 6 HOURS PRN
Qty: 28 TABLET | Refills: 0 | Status: SHIPPED | OUTPATIENT
Start: 2025-03-06 | End: 2025-03-13

## 2025-03-06 RX ORDER — DEXAMETHASONE 4 MG/1
4 TABLET ORAL
Qty: 4 TABLET | Refills: 0 | Status: SHIPPED | OUTPATIENT
Start: 2025-03-06 | End: 2025-03-10

## 2025-03-06 RX ORDER — POLYETHYLENE GLYCOL 3350 17 G/17G
17 POWDER, FOR SOLUTION ORAL DAILY
Qty: 7 EACH | Refills: 0 | Status: SHIPPED | OUTPATIENT
Start: 2025-03-06 | End: 2025-04-05

## 2025-03-06 RX ADMIN — SODIUM CHLORIDE, PRESERVATIVE FREE 10 ML: 5 INJECTION INTRAVENOUS at 10:24

## 2025-03-06 RX ADMIN — ASPIRIN 81 MG: 81 TABLET, COATED ORAL at 10:21

## 2025-03-06 RX ADMIN — KETOROLAC TROMETHAMINE 15 MG: 30 INJECTION, SOLUTION INTRAMUSCULAR at 10:22

## 2025-03-06 RX ADMIN — WATER 2000 MG: 1 INJECTION INTRAMUSCULAR; INTRAVENOUS; SUBCUTANEOUS at 03:30

## 2025-03-06 RX ADMIN — LEVOTHYROXINE SODIUM 88 MCG: 0.09 TABLET ORAL at 06:56

## 2025-03-06 RX ADMIN — FAMOTIDINE 20 MG: 20 TABLET, FILM COATED ORAL at 10:20

## 2025-03-06 RX ADMIN — ACETAMINOPHEN 650 MG: 325 TABLET ORAL at 04:27

## 2025-03-06 RX ADMIN — APIXABAN 2.5 MG: 5 TABLET, FILM COATED ORAL at 10:20

## 2025-03-06 RX ADMIN — METFORMIN HYDROCHLORIDE 500 MG: 500 TABLET, EXTENDED RELEASE ORAL at 10:20

## 2025-03-06 RX ADMIN — KETOROLAC TROMETHAMINE 15 MG: 30 INJECTION, SOLUTION INTRAMUSCULAR at 04:28

## 2025-03-06 RX ADMIN — ACETAMINOPHEN 650 MG: 325 TABLET ORAL at 10:19

## 2025-03-06 ASSESSMENT — PAIN DESCRIPTION - LOCATION
LOCATION: KNEE
LOCATION: KNEE

## 2025-03-06 ASSESSMENT — PAIN DESCRIPTION - DESCRIPTORS
DESCRIPTORS: DISCOMFORT
DESCRIPTORS: DISCOMFORT

## 2025-03-06 ASSESSMENT — PAIN DESCRIPTION - ORIENTATION
ORIENTATION: LEFT
ORIENTATION: LEFT

## 2025-03-06 ASSESSMENT — PAIN SCALES - GENERAL
PAINLEVEL_OUTOF10: 0
PAINLEVEL_OUTOF10: 1

## 2025-03-06 NOTE — PROGRESS NOTES
UNLISTED PROCEDURE CARDIAC SURGERY  2014, 2016    CARDIOVERSION 3X FOR A-FIB    SPINAL FUSION  2017    TONSILLECTOMY  1953    UPPER GASTROINTESTINAL ENDOSCOPY  02/20/2017    normal        Prior Level of Function/Environment/Context: Independent  , Prior Level of Assist for ADLs: Independent,  ,  ,  ,  ,  , Prior Level of Assist for Homemaking: Independent,  , Prior Level of Assist for Transfers: Independent, Active : Yes     Expanded or extensive additional review of patient history:   Social/Functional History  Lives With: Alone  Type of Home: House  Home Layout: Two level, Able to Live on Main level with bedroom/bathroom  Home Access: Stairs to enter with rails  Entrance Stairs - Number of Steps: 5  Entrance Stairs - Rails: Left  Bathroom Shower/Tub: Walk-in shower, Tub/Shower unit  Bathroom Toilet: Handicap height  Bathroom Equipment: Grab bars in shower, Toilet raiser, Safety frame  Bathroom Accessibility: Walker accessible  Home Equipment: Walker - Rolling, Cane  Has the patient had two or more falls in the past year or any fall with injury in the past year?: No  Prior Level of Assist for ADLs: Independent  Prior Level of Assist for Homemaking: Independent  Homemaking Responsibilities: Yes  Prior Level of Assist for Ambulation: Independent household ambulator, with or without device  Prior Level of Assist for Transfers: Independent  Active : Yes  Mode of Transportation: Car  Occupation: Retired    Hand Dominance: right     EXAMINATION OF PERFORMANCE DEFICITS:    Cognitive/Behavioral Status:    Orientation  Overall Orientation Status: Within Normal Limits  Orientation Level: Oriented X4  Cognition  Overall Cognitive Status: WNL        Vision/Perceptual:    Vision - Basic Assessment  Prior Vision: Wears glasses all the time                 Range of Motion:   AROM: Within functional limits  PROM: Within functional limits      Strength:  Strength: Within functional                                                 Pain Rating:  Mild c/o L knee pain (did not quantify)  Pain Intervention(s):   nursing notified and addressing, ice, rest, elevation, and repositioning    Activity Tolerance:   Good, Fair , and requires rest breaks    After treatment:   Patient left in no apparent distress in bed, Call bell within reach, Bed/ chair alarm activated, and Side rails x3    COMMUNICATION/EDUCATION:   The patient's plan of care was discussed with: physical therapist, registered nurse, and     Patient Education  Education Given To: Patient  Education Provided: Role of Therapy;Plan of Care;Precautions;ADL Adaptive Strategies;Energy Conservation;Transfer Training  Education Method: Demonstration;Verbal  Barriers to Learning: None  Education Outcome: Verbalized understanding;Demonstrated understanding    Thank you for this referral.  Matthew Kerley, OT  Minutes: 24    Occupational Therapy Evaluation Charge Determination   History Examination Decision-Making   LOW Complexity : Brief history review  LOW Complexity: 1-3 Performance deficits relating to physical, cognitive, or psychosocial skills that result in activity limitations and/or participation restrictions LOW Complexity: No comorbidities that affect functional and  no verbal  or physical assist needed to complete eval tasks   Based on the above components, the patient evaluation is determined to be of the following complexity level: Low

## 2025-03-06 NOTE — PLAN OF CARE
Problem: Physical Therapy - Adult  Goal: By Discharge: Performs mobility at highest level of function for planned discharge setting.  See evaluation for individualized goals.  Description: FUNCTIONAL STATUS PRIOR TO ADMISSION: Patient was modified independent using a single point cane for functional mobility.    HOME SUPPORT PRIOR TO ADMISSION: The patient lived alone and did not require assistance.    Physical Therapy Goals  Initiated 3/5/2025  1.  Patient will move from supine to sit and sit to supine and scoot up and down in bed with modified independence within 4 day(s).    2.  Patient will perform sit to stand with modified independence within 4 day(s).  3.  Patient will transfer from bed to chair and chair to bed with modified independence using the least restrictive device within 4 day(s).  4.  Patient will ambulate with modified independence for > 150 feet with the least restrictive device within 4 day(s).   5.  Patient will ascend/descend 4 stairs with one handrail(s) with supervision within 4 day(s).  6. Patient will perform TKR home exercise program per protocol with supervision/set-up within 4 days.  7. Patient will demonstrate AROM 0-90 degrees in operative joint within 4 days.     3/6/2025 1220 by Balbir Metzger, RN  Outcome: Goals Met    PHYSICAL THERAPY TREATMENT/DISCHARGE    Patient: Dona Padilla (80 y.o. female)  Date: 3/6/2025  Diagnosis: Pain due to total knee replacement [T84.84XA, Z96.659]  Painful orthopaedic hardware [T84.84XA] Pain due to total knee replacement  Procedure(s) (LRB):  LEFT TOTAL KNEE REVISION ARTHROPLASTY (Left) 1 Day Post-Op  Precautions: Restrictions/Precautions  Restrictions/Precautions: Fall Risk, Weight Bearing  Activity Level: Up with Assist Lower Extremity Weight Bearing Restrictions  Left Lower Extremity Weight Bearing: Weight Bearing As Tolerated          ASSESSMENT:  Patient has been followed by skilled PT services and has met acute care PT goals. Patient is cleared  understanding;Demonstrated understanding      Rachel Arvizu, PT  Minutes: 25

## 2025-03-06 NOTE — PLAN OF CARE
Problem: Chronic Conditions and Co-morbidities  Goal: Patient's chronic conditions and co-morbidity symptoms are monitored and maintained or improved  3/6/2025 1117 by Breana Roy LPN  Outcome: Progressing  3/6/2025 0059 by Lauro Crowe RN  Outcome: Progressing     Problem: Pain  Goal: Verbalizes/displays adequate comfort level or baseline comfort level  3/6/2025 0059 by Lauro Crowe RN  Outcome: Progressing     Problem: Safety - Adult  Goal: Free from fall injury  3/6/2025 0059 by Lauro Crowe RN  Outcome: Progressing     Problem: Discharge Planning  Goal: Discharge to home or other facility with appropriate resources  3/6/2025 0059 by Lauro Crowe RN  Outcome: Progressing  Flowsheets (Taken 3/5/2025 2100)  Discharge to home or other facility with appropriate resources: Identify barriers to discharge with patient and caregiver

## 2025-03-06 NOTE — PROGRESS NOTES
Ortho NP Note    POD# 1  s/p LEFT TOTAL KNEE REVISION ARTHROPLASTY   Pt seen with AIYANA Cronin. No visitor    Pt resting in bed comfortably.   Reports minimal postop knee pain, controlled with minimal use of tramadol   Tolerating regular diet. Denies nausea.    Postop plan reviewed - No complaints/concerns.    VSS Afebrile.    Visit Vitals  /60   Pulse 50   Temp 97.5 °F (36.4 °C) (Oral)   Resp 16   Ht 1.676 m (5' 6\")   Wt 78.5 kg (173 lb)   SpO2 96%   BMI 27.92 kg/m²       Voiding status: voiding          Labs    Lab Results   Component Value Date/Time    HGB 11.4 03/06/2025 03:20 AM      Lab Results   Component Value Date/Time    INR 1.0 02/17/2025 11:20 AM      Lab Results   Component Value Date/Time     03/06/2025 03:20 AM    K 3.8 03/06/2025 03:20 AM     03/06/2025 03:20 AM    CO2 22 03/06/2025 03:20 AM    BUN 17 03/06/2025 03:20 AM     Recent Glucose Results:   Glucose   Date Value Ref Range Status   03/06/2025 113 (H) 65 - 100 mg/dL Final   01/22/2025 90 70 - 99 mg/dL Final   09/14/2024 88 65 - 100 mg/dL Final   05/29/2024 90 70 - 99 mg/dL Final   12/01/2023 86 70 - 99 mg/dL Final           Body mass index is 27.92 kg/m². : A BMI > 30 is classified as obesity and > 40 is classified as morbid obesity.       Ace wrap removed. Prineo dressing c.d.I   Cryotherapy in place over incision  Calves soft and supple; No pain with passive stretch  Sensation and motor intact. +PF/DF/EHL intact   SCDs for mechanical DVT proph while in bed     PLAN:  1) PT BID - WBAT  2) Eliquis 2.5 mg PO BID for DVT Prophylaxis while inpatient, then resume home dose 5 mg BID (hx Afib). Encouraged early mobilization, bed exercises, and SCD use.  3) GI Prophylaxis - Pepcid    4) Pain control - scheduled tylenol  and toradol, and prn tramadol   5) Post op care - Continue bowel regimen, encouraged IS. Straight cath per protocol. eo to remain in place until follow up unless integrity is lost.    6) Readniess for discharge:

## 2025-03-06 NOTE — PROGRESS NOTES
Occupational Therapy    Orders received, chart reviewed and patient evaluated by occupational therapy, recommend:    No skilled occupational therapy    Recommend with nursing patient to complete as able in order to maintain strength, endurance and independence: OOB to chair 3x/day, RW ADLs with Mod Indep and performing RW toileting with Mod Indep. Thank you for your assistance.     Full evaluation to follow.     Thank you,  Matthew Kerley, OT

## 2025-03-06 NOTE — ANESTHESIA POSTPROCEDURE EVALUATION
Department of Anesthesiology  Postprocedure Note    Patient: Dona Padilla  MRN: [id number]  YOB: 1945  Date of evaluation: 3/6/2025    Procedure Summary       Date: 03/05/25 Room / Location: [facility] MAIN OR [address] [place] [address] MAIN OR    Anesthesia Start: 1104 Anesthesia Stop: 1226    Procedure: LEFT TOTAL KNEE REVISION ARTHROPLASTY (Left: Knee) Diagnosis:       Pain due to total knee replacement      (Pain due to total knee replacement [T84.84XA, Z96.659])    Providers: Jose Coughlin MD Responsible Provider: Uday Fay MD    Anesthesia Type: TIVA, general ASA Status: 3            Anesthesia Type: No value filed.    Mckenna Phase I: Mckenna Score: 10    Mckenna Phase II:      Anesthesia Post Evaluation    Patient location during evaluation: PACU  Patient participation: complete - patient participated  Level of consciousness: awake and alert  Airway patency: patent  Nausea & Vomiting: no nausea  Cardiovascular status: hemodynamically stable  Respiratory status: acceptable  Hydration status: euvolemic  Multimodal analgesia pain management approach  Pain management: adequate        No notable events documented.

## 2025-03-06 NOTE — CARE COORDINATION
KRISTA:   [facility]   Family transport   Already owns walker     Patient lives alone, has family proximal. Sister may be able to stay with her. CM discussed safety at home alone during first days following surgery and notified HH to please reach out to patient accordingly.      CM met with Pt, confirmed face sheet and HH choices; OBS Pt         03/06/25 8724   Service Assessment   Patient Orientation Alert and Oriented   Cognition Alert   History Provided By Patient   Primary Caregiver Self   Support Systems Family Members   Patient's Healthcare Decision Maker is: Legal Next of Kin   PCP Verified by CM Yes   Last Visit to PCP Within last 3 months   Prior Functional Level Independent in ADLs/IADLs   Current Functional Level Assistance with the following:;Mobility   Can patient return to prior living arrangement Yes   Ability to make needs known: Good   Family able to assist with home care needs: Yes   Discharge Planning   Patient expects to be discharged to: House   Services At/After Discharge   Confirm Follow Up Transport Family   Condition of Participation: Discharge Planning   The Plan for Transition of Care is related to the following treatment goals: Home Health   The Patient and/or Patient Representative was provided with a Choice of Provider? Patient   The Patient and/Or Patient Representative agree with the Discharge Plan? Yes   Freedom of Choice list was provided with basic dialogue that supports the patient's individualized plan of care/goals, treatment preferences, and shares the quality data associated with the providers?  Yes

## 2025-03-06 NOTE — PLAN OF CARE
Problem: Chronic Conditions and Co-morbidities  Goal: Patient's chronic conditions and co-morbidity symptoms are monitored and maintained or improved  Outcome: Progressing     Problem: Pain  Goal: Verbalizes/displays adequate comfort level or baseline comfort level  Outcome: Progressing     Problem: Safety - Adult  Goal: Free from fall injury  Outcome: Progressing     Problem: Discharge Planning  Goal: Discharge to home or other facility with appropriate resources  Outcome: Progressing  Flowsheets (Taken 3/5/2025 2100)  Discharge to home or other facility with appropriate resources: Identify barriers to discharge with patient and caregiver

## 2025-03-06 NOTE — PLAN OF CARE
Problem: Chronic Conditions and Co-morbidities  Goal: Patient's chronic conditions and co-morbidity symptoms are monitored and maintained or improved  3/6/2025 1117 by Breana Roy LPN  Outcome: Adequate for Discharge  3/6/2025 1117 by Breana Roy LPN  Outcome: Progressing  3/6/2025 0059 by Lauro Crowe RN  Outcome: Progressing     Problem: Pain  Goal: Verbalizes/displays adequate comfort level or baseline comfort level  3/6/2025 1117 by Breana Roy LPN  Outcome: Adequate for Discharge  3/6/2025 0059 by Lauro Crowe RN  Outcome: Progressing     Problem: Safety - Adult  Goal: Free from fall injury  3/6/2025 1117 by Breana Roy LPN  Outcome: Adequate for Discharge  3/6/2025 0059 by Lauro Crowe RN  Outcome: Progressing     Problem: Discharge Planning  Goal: Discharge to home or other facility with appropriate resources  3/6/2025 1117 by Breana Roy LPN  Outcome: Adequate for Discharge  3/6/2025 0059 by Lauro Crowe RN  Outcome: Progressing  Flowsheets (Taken 3/5/2025 2100)  Discharge to home or other facility with appropriate resources: Identify barriers to discharge with patient and caregiver

## 2025-03-06 NOTE — DISCHARGE SUMMARY
Ortho Discharge Summary    Patient ID:  Dona Padilla  [id number]  female  80 y.o.  1945    Admit date: 3/5/2025    Discharge date: 3/6/2025    Admitting Physician: Jose Coughlin MD     Consulting Physician(s):   Treatment Team:   Jose Coughlin MD Mudrick, Colin A, MD Dalton, Breaan CLINTON, ABDIAZIZN  Kerley, Matthew, OT  Rachel Arvizu, PT  SheilaJuani    Date of Surgery:   3/5/2025     Preoperative Diagnosis:  Pain due to total knee replacement [T84.84XA, Z96.659]    Postoperative Diagnosis:   * No post-op diagnosis entered *    Procedure(s):   LEFT TOTAL KNEE REVISION ARTHROPLASTY     Anesthesia Type:   Spinal     Surgeon: Jose Coughlin MD                            HPI:  Pt is a 80 y.o. female who has a history of Pain due to total knee replacement [T84.84XA, Z96.659]  with pain and limitations of activities of daily living who presents at this time for a LEFT TOTAL KNEE REVISION ARTHROPLASTY following the failure of conservative management.    PMH:   Past Medical History:   Diagnosis Date    Allergies     ENVIRONMENTAL ALLERGIES    Anticoagulant long-term use 2009    Anxiety     Arthritis     BACK AND \"EVERYWHERE\"    Atrial fibrillation ([facility]) 2009    Bursitis 2012    Cancer ([facility]) 2018    RIGHT BREAST    Chronic back pain 2012    NO BACK PAIN SINCE BACK SURGERY    Diverticulosis     GERD (gastroesophageal reflux disease)     Hearing loss 2019    WEARS HEARING AIDS    Hypertension     Hypothyroidism 1980    Impacted cerumen 8/10/2020    Internal hemorrhoids     Irritable bowel syndrome 2018    Neuropathy 2017    BILAT FEET    Obesity 2018    Osteoarthritis 1995    Peripheral vascular disease 2020    Skin tags, anus or rectum     found during colonscopy     Sleep apnea     DOES NOT WEAR CPAP    Tinnitus 2021    not constant and it isn't unpleasant...a xylophone ding    Type 2 diabetes mellitus without complication ([facility]) 2007    Urinary incontinence 2022       Body mass index is 27.92 kg/m². : A BMI >  Max Daily Amount: 200 mg            CONTINUE taking these medications      acetaminophen 500 MG tablet  Commonly known as: TYLENOL     apixaban 5 MG Tabs tablet  Commonly known as: ELIQUIS     fexofenadine 180 MG tablet  Commonly known as: ALLEGRA     flecainide 50 MG tablet  Commonly known as: TAMBOCOR     gabapentin 300 MG capsule  Commonly known as: NEURONTIN  TAKE ONE CAPSULE BY MOUTH EVERY EVENING     ibandronate 150 MG tablet  Commonly known as: BONIVA  Take 1 tablet by mouth every 30 days Taken the 10th of month     METAMUCIL FIBER PO     metFORMIN 500 MG extended release tablet  Commonly known as: GLUCOPHAGE-XR  Take 1 tablet by mouth 2 times daily     metoprolol succinate 25 MG extended release tablet  Commonly known as: TOPROL XL  Take 0.5 tablets by mouth daily     Synthroid 88 MCG tablet  Generic drug: levothyroxine  TAKE 1 TABLET DAILY BEFORE BREAKFAST FOR HYPOTHYROIDISM     vitamin B-12 1000 MCG tablet  Commonly known as: CYANOCOBALAMIN     vitamin C 500 MG tablet  Commonly known as: ASCORBIC ACID     vitamin D 25 MCG (1000 UT) Tabs tablet  Commonly known as: CHOLECALCIFEROL               Where to Get Your Medications        These medications were sent to [facility] [id number] - [place], [place] - [address] [facility] [address] - P [phone] - F [phone]  [address] [facility] [address]      Phone: [phone]   dexAMETHasone 4 MG tablet  naloxone 4 MG/0.1ML Liqd nasal spray  polyethylene glycol 17 GM/SCOOP powder  sennosides-docusate sodium 8.6-50 MG tablet  traMADol 50 MG tablet      per medical continuation form      -Follow up in office in 3 weeks      Signed:  Sisi Buck NP  Orthopaedic Nurse Practitioner    3/6/2025  11:14 AM

## 2025-03-07 ENCOUNTER — TELEPHONE (OUTPATIENT)
Dept: PRIMARY CARE CLINIC | Facility: CLINIC | Age: 80
End: 2025-03-07

## 2025-03-07 VITALS
WEIGHT: 173 LBS | OXYGEN SATURATION: 96 % | HEART RATE: 55 BPM | HEIGHT: 66 IN | BODY MASS INDEX: 27.8 KG/M2 | RESPIRATION RATE: 14 BRPM | DIASTOLIC BLOOD PRESSURE: 51 MMHG | TEMPERATURE: 95.5 F | SYSTOLIC BLOOD PRESSURE: 116 MMHG

## 2025-03-19 PROBLEM — Z96.652 S/P REVISION OF TOTAL KNEE, LEFT: Status: ACTIVE | Noted: 2025-03-19

## 2025-03-20 NOTE — PROGRESS NOTES
Physician Progress Note      PATIENT:               TROY COY  [facility] #:                  [id number]  :                       1945  ADMIT DATE:       3/5/2025 7:47 AM  DISCH DATE:        3/6/2025 12:20 PM  RESPONDING  PROVIDER #:        Sisi Buck NP          QUERY TEXT:    Patient admitted for left total knee revision arthroplasty and noted to have   atrial fibrillation and is maintained on Eliquis. If possible, please document   in progress notes and discharge summary if you are evaluating and/or treating   any of the following:?  ?  The medical record reflects the following:  Risk Factors: 78 yo F, htn, dm, pvd, a fib on eliquis    Clinical Indicators:  DCS-  Past Medical History:  Atrial fibrillation (HCC)    Prior to Admission Medications:  apixaban (ELIQUIS) 5 MG TABS tablet  Sig: Take 1 tablet by mouth        Discharge instructions:    - Anticoagulate with Eliquis      Treatment: eliquis  Options provided:  -- Secondary hypercoagulable state in a patient with atrial fibrillation  -- Other - I will add my own diagnosis  -- Disagree - Not applicable / Not valid  -- Disagree - Clinically unable to determine / Unknown  -- Refer to Clinical Documentation Reviewer    PROVIDER RESPONSE TEXT:    This patient has secondary hypercoagulable state in a patient with atrial   fibrillation.    Query created by: Mattie Richardson on 3/11/2025 6:38 PM      Electronically signed by:  Sisi Buck NP 3/20/2025 3:15 PM

## 2025-04-24 ENCOUNTER — PATIENT MESSAGE (OUTPATIENT)
Dept: PRIMARY CARE CLINIC | Facility: CLINIC | Age: 80
End: 2025-04-24

## 2025-04-24 DIAGNOSIS — E11.40 TYPE 2 DIABETES MELLITUS WITH DIABETIC NEUROPATHY, WITHOUT LONG-TERM CURRENT USE OF INSULIN (HCC): ICD-10-CM

## 2025-04-24 DIAGNOSIS — G57.92 UNSPECIFIED MONONEUROPATHY OF LEFT LOWER LIMB: ICD-10-CM

## 2025-04-24 RX ORDER — GABAPENTIN 300 MG/1
CAPSULE ORAL
Qty: 90 CAPSULE | Refills: 1 | Status: SHIPPED | OUTPATIENT
Start: 2025-04-24 | End: 2025-06-07

## 2025-06-03 ENCOUNTER — HOSPITAL ENCOUNTER (OUTPATIENT)
Facility: HOSPITAL | Age: 80
Setting detail: OUTPATIENT SURGERY
Discharge: HOME | End: 2025-06-03
Attending: INTERNAL MEDICINE | Admitting: INTERNAL MEDICINE
Payer: MEDICARE

## 2025-06-03 ENCOUNTER — ANESTHESIA EVENT (OUTPATIENT)
Facility: HOSPITAL | Age: 80
End: 2025-06-03
Payer: MEDICARE

## 2025-06-03 ENCOUNTER — ANESTHESIA (OUTPATIENT)
Facility: HOSPITAL | Age: 80
End: 2025-06-03
Payer: MEDICARE

## 2025-06-03 VITALS
WEIGHT: 170 LBS | TEMPERATURE: 97.8 F | SYSTOLIC BLOOD PRESSURE: 158 MMHG | OXYGEN SATURATION: 98 % | HEART RATE: 61 BPM | BODY MASS INDEX: 27.32 KG/M2 | RESPIRATION RATE: 15 BRPM | HEIGHT: 66 IN | DIASTOLIC BLOOD PRESSURE: 55 MMHG

## 2025-06-03 DIAGNOSIS — I10 ESSENTIAL HYPERTENSION: Chronic | ICD-10-CM

## 2025-06-03 PROCEDURE — 3700000001 HC ADD 15 MINUTES (ANESTHESIA): Performed by: INTERNAL MEDICINE

## 2025-06-03 PROCEDURE — 2580000003 HC RX 258: Performed by: INTERNAL MEDICINE

## 2025-06-03 PROCEDURE — 2709999900 HC NON-CHARGEABLE SUPPLY: Performed by: INTERNAL MEDICINE

## 2025-06-03 PROCEDURE — 3600007502: Performed by: INTERNAL MEDICINE

## 2025-06-03 PROCEDURE — 7100000010 HC PHASE II RECOVERY - FIRST 15 MIN: Performed by: INTERNAL MEDICINE

## 2025-06-03 PROCEDURE — 6360000002 HC RX W HCPCS: Performed by: NURSE ANESTHETIST, CERTIFIED REGISTERED

## 2025-06-03 PROCEDURE — 3700000000 HC ANESTHESIA ATTENDED CARE: Performed by: INTERNAL MEDICINE

## 2025-06-03 PROCEDURE — 3600007512: Performed by: INTERNAL MEDICINE

## 2025-06-03 PROCEDURE — 88305 TISSUE EXAM BY PATHOLOGIST: CPT

## 2025-06-03 PROCEDURE — 7100000011 HC PHASE II RECOVERY - ADDTL 15 MIN: Performed by: INTERNAL MEDICINE

## 2025-06-03 RX ORDER — LIDOCAINE HYDROCHLORIDE 20 MG/ML
INJECTION, SOLUTION EPIDURAL; INFILTRATION; INTRACAUDAL; PERINEURAL
Status: DISCONTINUED | OUTPATIENT
Start: 2025-06-03 | End: 2025-06-03 | Stop reason: SDUPTHER

## 2025-06-03 RX ORDER — SODIUM CHLORIDE 0.9 % (FLUSH) 0.9 %
5-40 SYRINGE (ML) INJECTION PRN
Status: DISCONTINUED | OUTPATIENT
Start: 2025-06-03 | End: 2025-06-03 | Stop reason: HOSPADM

## 2025-06-03 RX ORDER — SODIUM CHLORIDE 0.9 % (FLUSH) 0.9 %
5-40 SYRINGE (ML) INJECTION EVERY 12 HOURS SCHEDULED
Status: DISCONTINUED | OUTPATIENT
Start: 2025-06-03 | End: 2025-06-03 | Stop reason: HOSPADM

## 2025-06-03 RX ORDER — SODIUM CHLORIDE 9 MG/ML
INJECTION, SOLUTION INTRAVENOUS CONTINUOUS
Status: DISCONTINUED | OUTPATIENT
Start: 2025-06-03 | End: 2025-06-03 | Stop reason: HOSPADM

## 2025-06-03 RX ORDER — SODIUM CHLORIDE 9 MG/ML
INJECTION, SOLUTION INTRAVENOUS PRN
Status: DISCONTINUED | OUTPATIENT
Start: 2025-06-03 | End: 2025-06-03 | Stop reason: HOSPADM

## 2025-06-03 RX ADMIN — PROPOFOL 30 MG: 10 INJECTION, EMULSION INTRAVENOUS at 15:14

## 2025-06-03 RX ADMIN — PROPOFOL 30 MG: 10 INJECTION, EMULSION INTRAVENOUS at 15:16

## 2025-06-03 RX ADMIN — LIDOCAINE HYDROCHLORIDE 50 MG: 20 INJECTION, SOLUTION EPIDURAL; INFILTRATION; INTRACAUDAL; PERINEURAL at 15:11

## 2025-06-03 RX ADMIN — PROPOFOL 30 MG: 10 INJECTION, EMULSION INTRAVENOUS at 15:17

## 2025-06-03 RX ADMIN — SODIUM CHLORIDE: 9 INJECTION, SOLUTION INTRAVENOUS at 14:41

## 2025-06-03 RX ADMIN — PROPOFOL 50 MG: 10 INJECTION, EMULSION INTRAVENOUS at 15:12

## 2025-06-03 RX ADMIN — PROPOFOL 30 MG: 10 INJECTION, EMULSION INTRAVENOUS at 15:20

## 2025-06-03 RX ADMIN — PROPOFOL 50 MG: 10 INJECTION, EMULSION INTRAVENOUS at 15:11

## 2025-06-03 NOTE — ANESTHESIA POSTPROCEDURE EVALUATION
Department of Anesthesiology  Postprocedure Note    Patient: Dona Padilla  MRN: [id number]  YOB: 1945  Date of evaluation: 6/3/2025    Procedure Summary       Date: 06/03/25 Room / Location: [place] [address] [facility] ENDOSCOPY    Anesthesia Start: 1508 Anesthesia Stop: 1525    Procedure: COLONOSCOPY Diagnosis:       Change in bowel habit      Diarrhea, unspecified type      (Change in bowel habit [R19.4])      (Diarrhea, unspecified type [R19.7])    Surgeons: Mathieu Billingsley MD Responsible Provider: Mattie Jhaveri MD    Anesthesia Type: MAC ASA Status: 3            Anesthesia Type: No value filed.    Mckenna Phase I: Mckenna Score: 10    Mckenna Phase II: Mckenna Score: 10    Anesthesia Post Evaluation    Level of consciousness: awake  Airway patency: patent  Nausea & Vomiting: no nausea  Cardiovascular status: hemodynamically stable  Respiratory status: acceptable  Hydration status: euvolemic  Pain management: adequate        No notable events documented.

## 2025-06-03 NOTE — ANESTHESIA PRE PROCEDURE
01/22/2025 11:09 AM    ALKPHOS 78 01/22/2025 11:09 AM    AST 23 01/22/2025 11:09 AM    ALT 12 01/22/2025 11:09 AM       POC Tests: No results for input(s): \"POCGLU\", \"POCNA\", \"POCK\", \"POCCL\", \"POCBUN\", \"POCHEMO\", \"POCHCT\" in the last 72 hours.    Coags:   Lab Results   Component Value Date/Time    PROTIME 11.1 02/17/2025 11:20 AM    INR 1.0 02/17/2025 11:20 AM       HCG (If Applicable): No results found for: \"PREGTESTUR\", \"PREGSERUM\", \"HCG\", \"HCGQUANT\"     ABGs: No results found for: \"PHART\", \"PO2ART\", \"AIG1OQY\", \"WJY6DEU\", \"BEART\", \"Q9KEDINI\"     Type & Screen (If Applicable):  Lab Results   Component Value Date    ABORH O POSITIVE 02/17/2025    LABANTI NEG 02/17/2025       Drug/Infectious Status (If Applicable):  No results found for: \"HIV\", \"HEPCAB\"    COVID-19 Screening (If Applicable): No results found for: \"COVID19\"        Anesthesia Evaluation  Patient summary reviewed  Airway: Mallampati: II  TM distance: >3 FB   Neck ROM: full  Mouth opening: > = 3 FB   Dental:    (+) caps      Pulmonary:normal exam    (+)     sleep apnea:                                  Cardiovascular:    (+) hypertension:, dysrhythmias (Hx A-fib ablation): atrial fibrillation      ECG reviewed               Beta Blocker:  Dose within 24 Hrs      ROS comment:   ECG (9/14/24):  Sinus bradycardia   Low voltage QRS   Inferior infarct        Neuro/Psych:   (+) neuromuscular disease:, psychiatric history:             ROS comment: Peripheral neuropathy GI/Hepatic/Renal:   (+) GERD:         ROS comment: Change in bowel habit   Diarrhea       Diverticulosis  Irritable bowel syndrome  .   Endo/Other:    (+) DiabetesType II DM, hypothyroidism, blood dyscrasia (Patient on Eliquis): anticoagulation therapy:., malignancy/cancer (Hx Right breast cancer s/p lumpectomy).                  ROS comment: Hx Left total knee arthroplasty with subsequent revision on 3/6/25   Abdominal: normal exam            Vascular:          Other Findings:         Anesthesia

## 2025-06-03 NOTE — PROGRESS NOTES
Verified patient name and date of birth, scheduled procedure, and informed consent. Reviewed general discharge instructions and  information.  Assessed patient. Awake, alert, and oriented per baseline. Vital signs stable (see vital sign flowsheet). Respiratory status within defined limits, abdomen soft and non tender. Skin with in defined limits.     Initial RN admission and assessment performed and documented in Endoscopy navigator.     Patient evaluated by anesthesia in pre-procedure holding.     All procedural vital signs, airway assessment, and level of consciousness information monitored and recorded by anesthesia staff on the anesthesia record.     Report received from CRNA post procedure.  Patient transported to recovery area by RN.    Endoscopy post procedure time out was performed and specimens were verified with physician.    Endoscope was pre-cleaned at bedside immediately following procedure by Kaylee

## 2025-06-03 NOTE — OP NOTE
[facility]  Mathieu Amanda M.D.  [phone]            6/3/2025          Colonoscopy Operative Report  Dona Padilla  :  1945  [facility] Medical Record Number:  [id number]      Indications: Chronic diarrhea (improved in the last week and she attributes this to stopping her alendronate 3 weeks ago)    :  Mathieu Amanda MD    Referring Provider: Andres Rodriguez, APRN - NP    Sedation:  MAC    Pre-Procedural Exam:      Airway: clear,  No airway problems anticipated  Heart: RRR, without gallops or rubs  Lungs: clear bilaterally without wheezes, crackles, or rhonchi  Abdomen: soft, nontender, nondistended, bowel sounds present  Mental Status: awake, alert and oriented to person, place and time     Procedure Details:  After informed consent was obtained with all risks and benefits of procedure explained and preoperative exam completed, the patient was taken to the endoscopy suite and placed in the left lateral decubitus position.  Upon sequential sedation as per above, a digital rectal exam was performed. The Olympus videocolonoscope  was inserted in the rectum and carefully advanced to the cecum, which was identified by the ileocecal valve and appendiceal orifice, terminal ileum.  The quality of preparation was good.  The colonoscope was slowly withdrawn with careful inspection and evaluation between folds. Retroflexion in the rectum was performed.  The patient tolerated the procedure well.    Findings:   Terminal Ileum: Normal  Cecum: Normal  Ascending Colon: Normal  Transverse Colon: Normal  Descending Colon: Normal  Sigmoid: Normal  Rectum: Small hypertrophied internal anal papilla.  Otherwise, normal on antegrade and retroflexed views.   Multiple pull throughs were done through the cecum and ascending colon.     Interventions: Biopsies    Specimen Removed: 1) random colon biopsies    Complications: None.     EBL: Minimal.    Impression:    Normal exam.  Random colon

## 2025-06-03 NOTE — DISCHARGE INSTRUCTIONS
[facility]  Mathieu Amanda M.D.  [phone]            COLON DISCHARGE INSTRUCTIONS       6/3/2025    Dona Padilla  :  1945  [facility] Medical Record Number:  [id number]      COLONOSCOPY FINDINGS:  Normal exam.  Random colon biopsies were done to rule out microscopic colitis.    Recommendations:  -Await pathology.   -No further colonoscopies recommended for screening purposes given age.  -Regular diet.    -Resume normal medication(s).   -Resume your eliquis today   - Follow-up in the office on an as-needed basis.    DISCOMFORT:  Redness at IV site- apply warm compress to area; if redness or soreness persist- contact your physician  There may be a slight amount of blood passed from the rectum  Gaseous discomfort- walking, belching will help relieve any discomfort  You may not operate a vehicle for 12 hours  You may not engage in an occupation involving machinery or appliances for rest of today  You may not drink alcoholic beverages for at least 12 hours  Avoid making any critical decisions for at least 24 hour  DIET:   Resume usual diet   - however -  remember your colon is empty and a heavy meal will produce gas.   Avoid these foods:  vegetables, fried / greasy foods, carbonated drinks for today     ACTIVITY:  You may resume your normal daily activities it is recommended that you spend the remainder of the day resting -  avoid any strenuous activity.    CALL M.DIván  ANY SIGN OF:   Increasing pain, nausea, vomiting  Abdominal distension (swelling)  New increased bleeding (oral or rectal)  Fever (chills)  Pain in chest area  Bloody discharge from nose or mouth   Shortness of breath    Follow-up Instructions:  Call Dr. Amanda at [phone] if you have any questions or problems.  Biopsy results will be available in about 14 days. If you have not heard about your results within 2-3 weeks, please call the office.

## 2025-06-03 NOTE — H&P
[facility]  Mathieu Amanda M.D.  [phone]    History and Physical       NAME:  Dona Padilla   :   1945   MRN:   [id number]       Consult Date: 6/3/2025 2:42 PM    History of Present Illness:    Patient presents to office for diarrhea  She has a long history of constipation and was previously on fiber supplements. In  her stool pattern changed to diarrhea. Last year she completed a bowel cleanse but diarrhea did not get better after this. She had  KUB last summer which did not show significant constipation.    Now for symptoms she states she has diarrhea frequently. She states 15-30 minutes after she eats she has a liquid bowel movement, yellowish brown. There is never blood that she sees. This is after every meal. There is rarely a solid bowel movement. If there is a solid component, it is followed by a liquid stool.   She has two bowel movements per day, after breakfast and dinner. Symptoms never wake her from sleep  She does not have abdominal pain before bowel movement, just urgency.   She tried adding lactaid without benefit   No recent abx use.     In December she went to ED for HTN and afib which is better controlled now, she is on eliquis     she states 3 years ago she had severe constipation   last cscope 2018 normal recall 10 years. Specifically asks for giardia testing     PMH:  Past Medical History:   Diagnosis Date    Allergies     ENVIRONMENTAL ALLERGIES    Anticoagulant long-term use 2009    Anxiety     Arthritis     BACK AND \"EVERYWHERE\"    Atrial fibrillation (HCC) 2009    Bursitis 2012    Cancer (HCC) 2018    RIGHT BREAST    Chronic back pain 2012    NO BACK PAIN SINCE BACK SURGERY    Diverticulosis     GERD (gastroesophageal reflux disease)     Hearing loss 2019    WEARS HEARING AIDS    Hypertension     Hypothyroidism 1980    Impacted cerumen 8/10/2020    Internal hemorrhoids     Irritable bowel syndrome 2018    Neuropathy 2017    BILAT FEET    Obesity

## 2025-06-12 ENCOUNTER — PROCEDURE VISIT (OUTPATIENT)
Age: 80
End: 2025-06-12

## 2025-06-12 DIAGNOSIS — H90.A32 MIXED CONDUCTIVE AND SENSORINEURAL HEARING LOSS OF LEFT EAR WITH RESTRICTED HEARING OF RIGHT EAR: ICD-10-CM

## 2025-06-12 DIAGNOSIS — H90.A21 SENSORINEURAL HEARING LOSS (SNHL) OF RIGHT EAR WITH RESTRICTED HEARING OF LEFT EAR: Primary | ICD-10-CM

## 2025-06-12 NOTE — PROGRESS NOTES
Dona Padilla  1945.80 y.o. female   [id number]    HEARING AID CHECK    /MODEL: [employer] Back&eo P90-R    RIGHT EAR:  SN: [id number]  : -  EARMOLD/DOME: Small vented    LEFT EAR:  SN: [id number]  : -  EARMOLD/DOME: Medium open     SN: -  WARRANTY:     BUTTONS: ENABLED  PROGRAMS: DISABLED  PHONE CONNECTIVITY: DISABLED      Dona Padilla was seen today, 2025, for a hearing aid check appointment. She received these hearing aids from [employer] about 2 years ago.    PROCEDURES:     Patient noted echo in her left ear. Hearing aids were cleaned. Microphones were suctioned. Wax guards and domes were changed. Post-cleaning listening check revealed good function of the devices. The left closed dome was replaced with an open dome. No feedback was observed and the patient noted the echo was almost entirely gone. A feedback test will be run when she brings her hearing test in November. The patient was advised to try out this dome and return if she starts getting feedback.    PATIENT EDUCATION:     - Reviewed importance of consistent use and care and maintenance of devices.  - Despite the recommendation of [facility], it is unlikely the patient needs new hearing aids at this time. A true recommendation can be made when she brings her next [facility] hearing test to her hearing aid check in November     Information was shared with patient during appointment.    RECOMMENDATIONS:     Continue consistent hearing aid use  Return for a hearing aid check as needed  Retest hearing as medically indicated and/or sooner if a change in hearing is noted  Contact audiologist with questions/concerns as needed    **No charge for today's appointment; patient utilized 2 out of 3 follow-up visits.      Beverley Mejía, CCC-A  Audiologist

## 2025-07-22 ENCOUNTER — OFFICE VISIT (OUTPATIENT)
Dept: PRIMARY CARE CLINIC | Facility: CLINIC | Age: 80
End: 2025-07-22
Payer: MEDICARE

## 2025-07-22 VITALS
WEIGHT: 165 LBS | DIASTOLIC BLOOD PRESSURE: 74 MMHG | HEIGHT: 66 IN | TEMPERATURE: 98.1 F | RESPIRATION RATE: 16 BRPM | HEART RATE: 63 BPM | BODY MASS INDEX: 26.52 KG/M2 | SYSTOLIC BLOOD PRESSURE: 117 MMHG | OXYGEN SATURATION: 95 %

## 2025-07-22 DIAGNOSIS — E11.40 TYPE 2 DIABETES MELLITUS WITH DIABETIC NEUROPATHY, WITHOUT LONG-TERM CURRENT USE OF INSULIN (HCC): ICD-10-CM

## 2025-07-22 DIAGNOSIS — E03.9 ACQUIRED HYPOTHYROIDISM: ICD-10-CM

## 2025-07-22 DIAGNOSIS — M81.0 AGE-RELATED OSTEOPOROSIS WITHOUT CURRENT PATHOLOGICAL FRACTURE: Primary | ICD-10-CM

## 2025-07-22 DIAGNOSIS — G57.92 UNSPECIFIED MONONEUROPATHY OF LEFT LOWER LIMB: ICD-10-CM

## 2025-07-22 PROCEDURE — 1159F MED LIST DOCD IN RCRD: CPT | Performed by: NURSE PRACTITIONER

## 2025-07-22 PROCEDURE — 1090F PRES/ABSN URINE INCON ASSESS: CPT | Performed by: NURSE PRACTITIONER

## 2025-07-22 PROCEDURE — 1036F TOBACCO NON-USER: CPT | Performed by: NURSE PRACTITIONER

## 2025-07-22 PROCEDURE — 1160F RVW MEDS BY RX/DR IN RCRD: CPT | Performed by: NURSE PRACTITIONER

## 2025-07-22 PROCEDURE — 99214 OFFICE O/P EST MOD 30 MIN: CPT | Performed by: NURSE PRACTITIONER

## 2025-07-22 PROCEDURE — G8427 DOCREV CUR MEDS BY ELIG CLIN: HCPCS | Performed by: NURSE PRACTITIONER

## 2025-07-22 PROCEDURE — G8399 PT W/DXA RESULTS DOCUMENT: HCPCS | Performed by: NURSE PRACTITIONER

## 2025-07-22 PROCEDURE — 3044F HG A1C LEVEL LT 7.0%: CPT | Performed by: NURSE PRACTITIONER

## 2025-07-22 PROCEDURE — G8419 CALC BMI OUT NRM PARAM NOF/U: HCPCS | Performed by: NURSE PRACTITIONER

## 2025-07-22 PROCEDURE — 1123F ACP DISCUSS/DSCN MKR DOCD: CPT | Performed by: NURSE PRACTITIONER

## 2025-07-22 RX ORDER — METFORMIN HYDROCHLORIDE 500 MG/1
500 TABLET, EXTENDED RELEASE ORAL 2 TIMES DAILY
Qty: 180 TABLET | Refills: 1 | Status: SHIPPED | OUTPATIENT
Start: 2025-07-22

## 2025-07-22 RX ORDER — GABAPENTIN 300 MG/1
CAPSULE ORAL
Qty: 90 CAPSULE | Refills: 1 | Status: SHIPPED | OUTPATIENT
Start: 2025-07-22 | End: 2025-09-04

## 2025-07-22 ASSESSMENT — ENCOUNTER SYMPTOMS
CHEST TIGHTNESS: 0
SHORTNESS OF BREATH: 0

## 2025-07-22 NOTE — PROGRESS NOTES
Dona Padilla is a 80 y.o. female who was seen in clinic today (7/22/2025).    Assessment & Plan:   Below is the assessment and plan developed based on review of pertinent history, physical exam, labs, studies, and medications.    1. Age-related osteoporosis without current pathological fracture  Comments:  will check bone density test.   can start calcium 600 mg and vitamin D3 for now.  Orders:  -     DEXA BONE DENSITY AXIAL SKELETON; Future  -     Vitamin D 25 Hydroxy  2. Type 2 diabetes mellitus with diabetic neuropathy, without long-term current use of insulin (HCC)  Comments:  chronic, stable on current medications.  will monitor labs.  Orders:  -     gabapentin (NEURONTIN) 300 MG capsule; TAKE ONE CAPSULE BY MOUTH EVERY EVENING, Disp-90 capsule, R-1Normal  -     CBC  -     Comprehensive Metabolic Panel  -     Hemoglobin A1C  -     Lipid Panel  -     metFORMIN (GLUCOPHAGE-XR) 500 MG extended release tablet; Take 1 tablet by mouth 2 times daily, Disp-180 tablet, R-1Normal  3. Unspecified mononeuropathy of left lower limb  Comments:  well controlled on gabapentin at night.   uses cane for ambulation  Orders:  -     gabapentin (NEURONTIN) 300 MG capsule; TAKE ONE CAPSULE BY MOUTH EVERY EVENING, Disp-90 capsule, R-1Normal  4. Acquired hypothyroidism  Comments:  chronic, stable will monitor labs.  Orders:  -     TSH + Free T4 Panel      Return in about 6 months (around 1/26/2026) for MCW.    Subjective:   Dona was seen today for 6 Month Follow-Up     Hypothyroidism:  Patient has been using synthroid 75 mcg with good control over hypothyroidism.  Denies weight changes, palpitations, fatigue or trouble sleeping.      Diabetes: Last A1c was   Hemoglobin A1C   Date Value Ref Range Status   01/22/2025 5.5 4.8 - 5.6 % Final     Comment:                 Prediabetes: 5.7 - 6.4           Diabetes: >6.4           Glycemic control for adults with diabetes: <7.0       Diabetes well controlled on metformin.     Patient was

## 2025-07-22 NOTE — PROGRESS NOTES
Chief Complaint   Patient presents with    6 Month Follow-Up     /74 (BP Site: Left Upper Arm, Patient Position: Sitting)   Pulse 63   Temp 98.1 °F (36.7 °C) (Oral)   Resp 16   Ht 1.676 m (5' 6\")   Wt 74.8 kg (165 lb)   SpO2 95%   BMI 26.63 kg/m²     Have you been to the ER, urgent care clinic since your last visit?  Hospitalized since your last visit?   NO    Have you seen or consulted any other health care providers outside our system since your last visit?   NO

## 2025-07-23 LAB
25(OH)D3+25(OH)D2 SERPL-MCNC: 48 NG/ML (ref 30–100)
ALBUMIN SERPL-MCNC: 4.5 G/DL (ref 3.8–4.8)
ALP SERPL-CCNC: 71 IU/L (ref 44–121)
ALT SERPL-CCNC: 10 IU/L (ref 0–32)
AST SERPL-CCNC: 22 IU/L (ref 0–40)
BILIRUB SERPL-MCNC: 1.3 MG/DL (ref 0–1.2)
BUN SERPL-MCNC: 15 MG/DL (ref 8–27)
BUN/CREAT SERPL: 21 (ref 12–28)
CALCIUM SERPL-MCNC: 9.4 MG/DL (ref 8.7–10.3)
CHLORIDE SERPL-SCNC: 102 MMOL/L (ref 96–106)
CHOLEST SERPL-MCNC: 148 MG/DL (ref 100–199)
CO2 SERPL-SCNC: 23 MMOL/L (ref 20–29)
CREAT SERPL-MCNC: 0.71 MG/DL (ref 0.57–1)
EGFRCR SERPLBLD CKD-EPI 2021: 87 ML/MIN/1.73
ERYTHROCYTE [DISTWIDTH] IN BLOOD BY AUTOMATED COUNT: 12 % (ref 11.7–15.4)
EST. AVERAGE GLUCOSE BLD GHB EST-MCNC: 103 MG/DL
GLOBULIN SER CALC-MCNC: 2.4 G/DL (ref 1.5–4.5)
GLUCOSE SERPL-MCNC: 95 MG/DL (ref 70–99)
HBA1C MFR BLD: 5.2 % (ref 4.8–5.6)
HCT VFR BLD AUTO: 41.1 % (ref 34–46.6)
HDLC SERPL-MCNC: 68 MG/DL
HGB BLD-MCNC: 13.5 G/DL (ref 11.1–15.9)
LDLC SERPL CALC-MCNC: 58 MG/DL (ref 0–99)
MCH RBC QN AUTO: 33 PG (ref 26.6–33)
MCHC RBC AUTO-ENTMCNC: 32.8 G/DL (ref 31.5–35.7)
MCV RBC AUTO: 101 FL (ref 79–97)
PLATELET # BLD AUTO: 273 X10E3/UL (ref 150–450)
POTASSIUM SERPL-SCNC: 4.6 MMOL/L (ref 3.5–5.2)
PROT SERPL-MCNC: 6.9 G/DL (ref 6–8.5)
RBC # BLD AUTO: 4.09 X10E6/UL (ref 3.77–5.28)
SODIUM SERPL-SCNC: 141 MMOL/L (ref 134–144)
T4 FREE SERPL-MCNC: 1.52 NG/DL (ref 0.82–1.77)
TRIGL SERPL-MCNC: 128 MG/DL (ref 0–149)
TSH SERPL DL<=0.005 MIU/L-ACNC: 0.75 UIU/ML (ref 0.45–4.5)
VLDLC SERPL CALC-MCNC: 22 MG/DL (ref 5–40)
WBC # BLD AUTO: 6 X10E3/UL (ref 3.4–10.8)

## 2025-07-29 ENCOUNTER — HOSPITAL ENCOUNTER (OUTPATIENT)
Facility: HOSPITAL | Age: 80
Discharge: HOME | End: 2025-08-01
Payer: MEDICARE

## 2025-07-29 DIAGNOSIS — M81.0 AGE-RELATED OSTEOPOROSIS WITHOUT CURRENT PATHOLOGICAL FRACTURE: ICD-10-CM

## 2025-07-29 PROCEDURE — 77080 DXA BONE DENSITY AXIAL: CPT

## 2025-08-04 ENCOUNTER — TELEPHONE (OUTPATIENT)
Dept: PRIMARY CARE CLINIC | Facility: CLINIC | Age: 80
End: 2025-08-04

## 2025-08-21 ENCOUNTER — OFFICE VISIT (OUTPATIENT)
Dept: PRIMARY CARE CLINIC | Facility: CLINIC | Age: 80
End: 2025-08-21
Payer: MEDICARE

## 2025-08-21 VITALS
HEIGHT: 66 IN | HEART RATE: 65 BPM | WEIGHT: 169 LBS | OXYGEN SATURATION: 98 % | RESPIRATION RATE: 16 BRPM | TEMPERATURE: 98.1 F | DIASTOLIC BLOOD PRESSURE: 78 MMHG | BODY MASS INDEX: 27.16 KG/M2 | SYSTOLIC BLOOD PRESSURE: 124 MMHG

## 2025-08-21 DIAGNOSIS — I48.0 PAROXYSMAL ATRIAL FIBRILLATION (HCC): ICD-10-CM

## 2025-08-21 DIAGNOSIS — G57.92 UNSPECIFIED MONONEUROPATHY OF LEFT LOWER LIMB: ICD-10-CM

## 2025-08-21 DIAGNOSIS — Z01.818 PRE-OP EXAM: Primary | ICD-10-CM

## 2025-08-21 DIAGNOSIS — E11.40 TYPE 2 DIABETES MELLITUS WITH DIABETIC NEUROPATHY, WITHOUT LONG-TERM CURRENT USE OF INSULIN (HCC): ICD-10-CM

## 2025-08-21 DIAGNOSIS — H25.012 CORTICAL AGE-RELATED CATARACT OF LEFT EYE: ICD-10-CM

## 2025-08-21 PROCEDURE — G8399 PT W/DXA RESULTS DOCUMENT: HCPCS | Performed by: NURSE PRACTITIONER

## 2025-08-21 PROCEDURE — 3044F HG A1C LEVEL LT 7.0%: CPT | Performed by: NURSE PRACTITIONER

## 2025-08-21 PROCEDURE — 1036F TOBACCO NON-USER: CPT | Performed by: NURSE PRACTITIONER

## 2025-08-21 PROCEDURE — 1090F PRES/ABSN URINE INCON ASSESS: CPT | Performed by: NURSE PRACTITIONER

## 2025-08-21 PROCEDURE — G8419 CALC BMI OUT NRM PARAM NOF/U: HCPCS | Performed by: NURSE PRACTITIONER

## 2025-08-21 PROCEDURE — G8427 DOCREV CUR MEDS BY ELIG CLIN: HCPCS | Performed by: NURSE PRACTITIONER

## 2025-08-21 PROCEDURE — 99214 OFFICE O/P EST MOD 30 MIN: CPT | Performed by: NURSE PRACTITIONER

## 2025-08-21 PROCEDURE — 1123F ACP DISCUSS/DSCN MKR DOCD: CPT | Performed by: NURSE PRACTITIONER

## 2025-08-21 PROCEDURE — 1160F RVW MEDS BY RX/DR IN RCRD: CPT | Performed by: NURSE PRACTITIONER

## 2025-08-21 PROCEDURE — 1159F MED LIST DOCD IN RCRD: CPT | Performed by: NURSE PRACTITIONER

## 2025-08-21 RX ORDER — LANOLIN ALCOHOL/MO/W.PET/CERES
CREAM (GRAM) TOPICAL
COMMUNITY
End: 2025-08-21

## 2025-08-21 RX ORDER — CALCIUM CARBONATE/VITAMIN D3 600 MG-10
TABLET ORAL
COMMUNITY

## 2025-08-21 ASSESSMENT — ENCOUNTER SYMPTOMS
CHEST TIGHTNESS: 0
SHORTNESS OF BREATH: 0

## (undated) DEVICE — GLOVE SURG SZ 8 L12IN FNGR THK94MIL STD WHT LTX FREE

## (undated) DEVICE — KENDALL SCD EXPRESS SLEEVES, KNEE LENGTH, MEDIUM: Brand: KENDALL SCD

## (undated) DEVICE — SURGIFOAM SPNG SZ 100

## (undated) DEVICE — SUPPLEMENT DIGESTIVE H2O SOL GI-EASE

## (undated) DEVICE — Device: Brand: JELCO

## (undated) DEVICE — BANDAGE COMPR W6INXL12FT SMOOTH FOR LIMB EXSANG ESMARCH

## (undated) DEVICE — SUTURE VCRL SZ 1 L36IN ABSRB VLT L48MM CTX 1/2 CIR J371H

## (undated) DEVICE — SUTURE STRATAFIX SYMMETRIC PDS + SZ 1 L18IN ABSRB VLT L48MM SXPP1A400

## (undated) DEVICE — YANKAUER OPEN TIP, NO VENT: Brand: ARGYLE

## (undated) DEVICE — CLIP LIG L TI MTL LIG SYS 24 COUNT HORZ

## (undated) DEVICE — FORCEPS BX L240CM JAW DIA2.8MM L CAP W/ NDL MIC MESH TOOTH

## (undated) DEVICE — HOOD, PEEL-AWAY: Brand: FLYTE

## (undated) DEVICE — TOTAL JOINT - SMH: Brand: MEDLINE INDUSTRIES, INC.

## (undated) DEVICE — SUTURE VCRL SZ 3-0 L27IN ABSRB UD L26MM SH 1/2 CIR J416H

## (undated) DEVICE — PREP SKN PREVAIL 40ML APPL --

## (undated) DEVICE — DRAPE,EXTREMITY,89X128,STERILE: Brand: MEDLINE

## (undated) DEVICE — Device

## (undated) DEVICE — SOL IRR SOD CHL 0.9% TITAN XL CNTNR 3000ML

## (undated) DEVICE — SOLUTION IV 1000ML 0.9% SOD CHL

## (undated) DEVICE — STERILE POLYISOPRENE POWDER-FREE SURGICAL GLOVES WITH EMOLLIENT COATING: Brand: PROTEXIS

## (undated) DEVICE — TOOL 14CY60 LEGEND 14CM 6MM CY: Brand: MIDAS REX ™

## (undated) DEVICE — SHEET, T, LAPAROTOMY, STERILE: Brand: MEDLINE

## (undated) DEVICE — HANDLE LT SNAP ON ULT DURABLE LENS FOR TRUMPF ALC DISPOSABLE

## (undated) DEVICE — SOLUTION IV 250ML 0.9% SOD CHL CLR INJ FLX BG CONT PRT CLSR

## (undated) DEVICE — PRECISION OFFSET (13.0 X 0.51 X 39.0MM)

## (undated) DEVICE — PADDING CAST W6INXL4YD NONSTERILE COT RAYON MICROPLEATED

## (undated) DEVICE — 1010 S-DRAPE TOWEL DRAPE 10/BX: Brand: STERI-DRAPE™

## (undated) DEVICE — GLOVE ORTHO 8   MSG9480

## (undated) DEVICE — SPONGE LAP 18X18IN STRL -- 5/PK

## (undated) DEVICE — ELECTRODE PT RET AD L9FT HI MOIST COND ADH HYDRGEL CORDED

## (undated) DEVICE — APPLICATOR MEDICATED 26 CC SOLUTION SCRB TEAL CHLORAPREP

## (undated) DEVICE — BLADE SAW W0.49XL3.15IN THK0.047IN CUT THK0.047IN REPL SAG

## (undated) DEVICE — COVER,TABLE,HEAVY DUTY,60"X90",STRL: Brand: MEDLINE

## (undated) DEVICE — CONTAINER,SPECIMEN,3OZ,OR STRL: Brand: MEDLINE

## (undated) DEVICE — SUTURE PERMAHAND SZ 2-0 L30IN NONABSORBABLE BLK SILK W/O A305H

## (undated) DEVICE — HANDPIECE LAP L23MM DIA5MM VES SEAL CUT CRV JAW PSTL GRP

## (undated) DEVICE — SUTURE NONABSORBABLE MONOFILAMENT CV-6 TTC-9 24 IN GORTX 6K02B

## (undated) DEVICE — HYPODERMIC SAFETY NEEDLE: Brand: MAGELLAN

## (undated) DEVICE — SPONGE: SPECIALTY PEANUT XR 100/CS: Brand: MEDICAL ACTION INDUSTRIES

## (undated) DEVICE — SYSTEM SKIN CLSR 22CM DERMBND PRINEO

## (undated) DEVICE — SYRINGE 20ML LL S/C 50

## (undated) DEVICE — BLADE SAW W098XL354IN THK0047IN CUT THK0047IN SAG

## (undated) DEVICE — LAMINECTOMY RICHMOND-LF: Brand: MEDLINE INDUSTRIES, INC.

## (undated) DEVICE — INFECTION CONTROL KIT SYS

## (undated) DEVICE — GOWN,SIRUS,NONRNF,SETINSLV,2XL,18/CS: Brand: MEDLINE

## (undated) DEVICE — SUTURE VICRYL 1 L27IN ABSRB CT BRAID COAT UD J281H

## (undated) DEVICE — TRANSFER SET 3": Brand: MEDLINE INDUSTRIES, INC.

## (undated) DEVICE — SUTURE VCRL SZ 1 L27IN ABSRB UD L36MM CP-1 1/2 CIR REV CUT J268H

## (undated) DEVICE — SPONGE GZ W4XL4IN COT 12 PLY TYP VII WVN C FLD DSGN STERILE

## (undated) DEVICE — SEALANT FIBRIN 10 CC FRZN PRE FILLED SYR TISSEEL

## (undated) DEVICE — ZINACTIVE USE 2641837 CLIP LIG M BLU TI HRT SHP WIRE HORZ 600 PER BX

## (undated) DEVICE — SCRUBIN SCRUB BRUSH DRY STER: Brand: MEDLINE INDUSTRIES, INC.

## (undated) DEVICE — FLOSEAL HEMOSTATIC MATRIX, 10 ML: Brand: FLOSEAL

## (undated) DEVICE — SOLUTION IRRIG 3000ML 0.9% SOD CHL FLX CONT 0797208] ICU MEDICAL INC]

## (undated) DEVICE — TUBING SUCT 12FR MAL ALUM SHFT FN CAP VENT UNIV CONN W/ OBT

## (undated) DEVICE — PILLOW POS AD L7IN R FOAM HD REST INTUB SLOT DISP

## (undated) DEVICE — DEVON™ KNEE AND BODY STRAP 60" X 3" (1.5 M X 7.6 CM): Brand: DEVON

## (undated) DEVICE — HANDPIECE SET WITH BONE CLEANING TIP AND SUCTION TUBE: Brand: INTERPULSE

## (undated) DEVICE — DRAIN KT WND 10FR RND 400ML --

## (undated) DEVICE — BANDAGE COMPR M W6INXL10YD WHT BGE VELC E MTRX HK AND LOOP

## (undated) DEVICE — SUTURE VCRL SZ 3-0 L27IN ABSRB UD CP-2 L26MM 1/2 CIR REV J868H

## (undated) DEVICE — LIQUIBAND RAPID ADHESIVE 36/CS 0.8ML: Brand: MEDLINE

## (undated) DEVICE — ASTOUND STANDARD SURGICAL GOWN, XXL: Brand: CONVERTORS

## (undated) DEVICE — CODMAN® INTEGRATED BIPOLAR CORD AND TUBING SET FLYING LEADS, ROTARY PUMP: Brand: CODMAN®

## (undated) DEVICE — 4-PORT MANIFOLD: Brand: NEPTUNE 2

## (undated) DEVICE — SUTURE VICRYL ABSORBABLE BRAIDED 2-0 CT 36 IN DA UD  VCP957H

## (undated) DEVICE — SUTURE PERMAHAND SZ 3-0 L30IN NONABSORBABLE BLK SILK BRAID A304H

## (undated) DEVICE — MEDI-VAC NON-CONDUCTIVE SUCTION TUBING: Brand: CARDINAL HEALTH

## (undated) DEVICE — BOWL BNE CEM MIX SPAT CURET SMARTMIX CTS

## (undated) DEVICE — BONE WAX WHITE: Brand: BONE WAX WHITE

## (undated) DEVICE — BLADE SAW W083XL354IN THK0047IN CUT THK0047IN SAG FLR

## (undated) DEVICE — PADDING CAST W6INXL4YD ST COT RAYON MICROPLEATED HIGHLY

## (undated) DEVICE — SUTURE VCRL 2-0 L27IN ABSRB UD CP-2 L26MM 1/2 CIR REV CUT J869H

## (undated) DEVICE — SLIM BODY SKIN STAPLER: Brand: APPOSE ULC

## (undated) DEVICE — TRAY CATH 16F URIN MTR LTX -- CONVERT TO ITEM 363111

## (undated) DEVICE — TOOL 14BA50 LEGEND 14CM 5MM BA: Brand: MIDAS REX ™